# Patient Record
Sex: MALE | Race: WHITE | Employment: OTHER | URBAN - METROPOLITAN AREA
[De-identification: names, ages, dates, MRNs, and addresses within clinical notes are randomized per-mention and may not be internally consistent; named-entity substitution may affect disease eponyms.]

---

## 2017-04-25 PROBLEM — I10 ESSENTIAL HYPERTENSION: Chronic | Status: ACTIVE | Noted: 2017-04-25

## 2017-04-25 PROBLEM — I25.10 CORONARY ARTERY DISEASE INVOLVING NATIVE CORONARY ARTERY WITHOUT ANGINA PECTORIS: Chronic | Status: ACTIVE | Noted: 2017-04-25

## 2017-04-25 PROBLEM — G47.33 OSA (OBSTRUCTIVE SLEEP APNEA): Chronic | Status: ACTIVE | Noted: 2017-04-25

## 2017-04-25 PROBLEM — N17.9 ACUTE KIDNEY INJURY (HCC): Status: ACTIVE | Noted: 2017-04-25

## 2017-04-25 PROBLEM — Z86.711 HISTORY OF PULMONARY EMBOLUS (PE): Chronic | Status: ACTIVE | Noted: 2017-04-25

## 2017-04-25 PROBLEM — E78.5 HYPERLIPIDEMIA LDL GOAL <100: Chronic | Status: ACTIVE | Noted: 2017-04-25

## 2017-04-25 PROBLEM — E86.0 DEHYDRATION: Status: ACTIVE | Noted: 2017-04-25

## 2017-04-25 PROBLEM — E66.01 MORBID OBESITY WITH BMI OF 60.0-69.9, ADULT (HCC): Chronic | Status: ACTIVE | Noted: 2017-04-25

## 2017-05-03 PROBLEM — I50.31 ACUTE DIASTOLIC HEART FAILURE (HCC): Status: ACTIVE | Noted: 2017-05-03

## 2018-09-26 PROBLEM — E86.0 DEHYDRATION: Status: RESOLVED | Noted: 2017-04-25 | Resolved: 2018-09-26

## 2018-11-05 ENCOUNTER — APPOINTMENT (OUTPATIENT)
Dept: GENERAL RADIOLOGY | Age: 63
DRG: 176 | End: 2018-11-05
Payer: COMMERCIAL

## 2018-11-05 ENCOUNTER — HOSPITAL ENCOUNTER (INPATIENT)
Age: 63
LOS: 2 days | Discharge: HOME | DRG: 176 | End: 2018-11-07
Attending: EMERGENCY MEDICINE | Admitting: NEUROMUSCULOSKELETAL MEDICINE & OMM
Payer: COMMERCIAL

## 2018-11-05 ENCOUNTER — APPOINTMENT (OUTPATIENT)
Dept: CT IMAGING | Age: 63
DRG: 176 | End: 2018-11-05
Payer: COMMERCIAL

## 2018-11-05 ENCOUNTER — APPOINTMENT (OUTPATIENT)
Dept: ULTRASOUND IMAGING | Age: 63
DRG: 176 | End: 2018-11-05
Payer: COMMERCIAL

## 2018-11-05 DIAGNOSIS — I26.99 OTHER ACUTE PULMONARY EMBOLISM WITHOUT ACUTE COR PULMONALE (HCC): Primary | ICD-10-CM

## 2018-11-05 DIAGNOSIS — R06.89 DYSPNEA AND RESPIRATORY ABNORMALITIES: ICD-10-CM

## 2018-11-05 DIAGNOSIS — R06.00 DYSPNEA AND RESPIRATORY ABNORMALITIES: ICD-10-CM

## 2018-11-05 LAB
ANION GAP SERPL CALCULATED.3IONS-SCNC: 9 MMOL/L (ref 7–16)
BUN BLDV-MCNC: 36 MG/DL (ref 8–23)
CALCIUM SERPL-MCNC: 8.9 MG/DL (ref 8.6–10.2)
CHLORIDE BLD-SCNC: 99 MMOL/L (ref 98–107)
CO2: 34 MMOL/L (ref 22–29)
CREAT SERPL-MCNC: 1.6 MG/DL (ref 0.7–1.2)
D DIMER: 4058 NG/ML DDU
GFR AFRICAN AMERICAN: 53
GFR NON-AFRICAN AMERICAN: 44 ML/MIN/1.73
GLUCOSE BLD-MCNC: 104 MG/DL (ref 74–99)
HCT VFR BLD CALC: 61.4 % (ref 37–54)
HEMOGLOBIN: 18.7 G/DL (ref 12.5–16.5)
LACTIC ACID: 1.1 MMOL/L (ref 0.5–2.2)
MCH RBC QN AUTO: 24.3 PG (ref 26–35)
MCHC RBC AUTO-ENTMCNC: 30.5 % (ref 32–34.5)
MCV RBC AUTO: 79.9 FL (ref 80–99.9)
METER GLUCOSE: 125 MG/DL (ref 74–99)
METER GLUCOSE: 197 MG/DL (ref 74–99)
PDW BLD-RTO: 18.1 FL (ref 11.5–15)
PLATELET # BLD: 307 E9/L (ref 130–450)
PMV BLD AUTO: 10.2 FL (ref 7–12)
POTASSIUM SERPL-SCNC: 4.9 MMOL/L (ref 3.5–5)
PRO-BNP: 3122 PG/ML (ref 0–125)
RBC # BLD: 7.68 E12/L (ref 3.8–5.8)
SODIUM BLD-SCNC: 142 MMOL/L (ref 132–146)
TROPONIN: 0.07 NG/ML (ref 0–0.03)
WBC # BLD: 9 E9/L (ref 4.5–11.5)

## 2018-11-05 PROCEDURE — 82962 GLUCOSE BLOOD TEST: CPT

## 2018-11-05 PROCEDURE — 71045 X-RAY EXAM CHEST 1 VIEW: CPT

## 2018-11-05 PROCEDURE — 71275 CT ANGIOGRAPHY CHEST: CPT

## 2018-11-05 PROCEDURE — 96374 THER/PROPH/DIAG INJ IV PUSH: CPT

## 2018-11-05 PROCEDURE — 2060000000 HC ICU INTERMEDIATE R&B

## 2018-11-05 PROCEDURE — 83605 ASSAY OF LACTIC ACID: CPT

## 2018-11-05 PROCEDURE — 83880 ASSAY OF NATRIURETIC PEPTIDE: CPT

## 2018-11-05 PROCEDURE — 94760 N-INVAS EAR/PLS OXIMETRY 1: CPT

## 2018-11-05 PROCEDURE — 85027 COMPLETE CBC AUTOMATED: CPT

## 2018-11-05 PROCEDURE — 6370000000 HC RX 637 (ALT 250 FOR IP): Performed by: NEUROMUSCULOSKELETAL MEDICINE & OMM

## 2018-11-05 PROCEDURE — 99285 EMERGENCY DEPT VISIT HI MDM: CPT

## 2018-11-05 PROCEDURE — 6360000004 HC RX CONTRAST MEDICATION: Performed by: RADIOLOGY

## 2018-11-05 PROCEDURE — 93970 EXTREMITY STUDY: CPT

## 2018-11-05 PROCEDURE — 36415 COLL VENOUS BLD VENIPUNCTURE: CPT

## 2018-11-05 PROCEDURE — 6360000002 HC RX W HCPCS: Performed by: EMERGENCY MEDICINE

## 2018-11-05 PROCEDURE — 85378 FIBRIN DEGRADE SEMIQUANT: CPT

## 2018-11-05 PROCEDURE — 80048 BASIC METABOLIC PNL TOTAL CA: CPT

## 2018-11-05 PROCEDURE — 84484 ASSAY OF TROPONIN QUANT: CPT

## 2018-11-05 RX ORDER — ASPIRIN 325 MG
1000 TABLET ORAL EVERY MORNING
Status: DISCONTINUED | OUTPATIENT
Start: 2018-11-06 | End: 2018-11-07 | Stop reason: HOSPADM

## 2018-11-05 RX ORDER — INSULIN GLARGINE 100 [IU]/ML
24 INJECTION, SOLUTION SUBCUTANEOUS NIGHTLY
Refills: 7 | COMMUNITY
Start: 2018-10-27

## 2018-11-05 RX ORDER — CHOLECALCIFEROL (VITAMIN D3) 50 MCG
6000 TABLET ORAL EVERY MORNING
Status: DISCONTINUED | OUTPATIENT
Start: 2018-11-06 | End: 2018-11-07 | Stop reason: HOSPADM

## 2018-11-05 RX ORDER — ASPIRIN 500 MG
1000 TABLET ORAL EVERY MORNING
COMMUNITY
End: 2019-07-16 | Stop reason: ALTCHOICE

## 2018-11-05 RX ORDER — INSULIN GLARGINE 100 [IU]/ML
35 INJECTION, SOLUTION SUBCUTANEOUS NIGHTLY
Status: DISCONTINUED | OUTPATIENT
Start: 2018-11-05 | End: 2018-11-07 | Stop reason: HOSPADM

## 2018-11-05 RX ORDER — FUROSEMIDE 40 MG/1
80 TABLET ORAL EVERY MORNING
Status: DISCONTINUED | OUTPATIENT
Start: 2018-11-06 | End: 2018-11-06

## 2018-11-05 RX ORDER — HYDRALAZINE HYDROCHLORIDE 50 MG/1
50 TABLET, FILM COATED ORAL EVERY MORNING
Status: DISCONTINUED | OUTPATIENT
Start: 2018-11-06 | End: 2018-11-07 | Stop reason: HOSPADM

## 2018-11-05 RX ORDER — FUROSEMIDE 40 MG/1
2 TABLET ORAL EVERY MORNING
Refills: 3 | COMMUNITY
Start: 2018-10-27 | End: 2019-07-16 | Stop reason: ALTCHOICE

## 2018-11-05 RX ADMIN — IOPAMIDOL 90 ML: 755 INJECTION, SOLUTION INTRAVENOUS at 14:12

## 2018-11-05 RX ADMIN — ENOXAPARIN SODIUM 180 MG: 100 INJECTION SUBCUTANEOUS at 13:43

## 2018-11-05 RX ADMIN — INSULIN GLARGINE 35 UNITS: 100 INJECTION, SOLUTION SUBCUTANEOUS at 20:46

## 2018-11-05 ASSESSMENT — PAIN SCALES - GENERAL
PAINLEVEL_OUTOF10: 0
PAINLEVEL_OUTOF10: 0
PAINLEVEL_OUTOF10: 1

## 2018-11-05 NOTE — ED PROVIDER NOTES
RADIOLOGY:  Interpreted by Radiologist.  CTA PULMONARY W CONTRAST   Final Result   Large bilateral central pulmonary embolism clot volume appearing to   represent a disrupted saddle embolus. At this time, there is no evidence of a peripheral infarct, effusion,   or right heart strain. Other incidental nonacute findings are described above. ALERT:  THIS IS AN ABNORMAL REPORT-acute bilateral pulmonary embolism. Note: Findings were communicated by myself to the emergency department   physician at the time of this dictation. US DUP LOWER EXTREMITIES BILATERAL VENOUS   Final Result   Patent deep venous system of both lower extremities. No   evidence for DVT. XR CHEST PORTABLE   Final Result   Mild CHF without edema. There is slight soft tissue prominence right hilum. Surveillance   recommended. ------------------------- NURSING NOTES AND VITALS REVIEWED ---------------------------   The nursing notes within the ED encounter and vital signs as below have been reviewed. /73   Pulse 85   Temp 98 °F (36.7 °C) (Oral)   Resp 18   Ht 5' 9\" (1.753 m)   Wt (!) 400 lb 11.2 oz (181.8 kg)   SpO2 92%   BMI 59.17 kg/m²   Oxygen Saturation Interpretation: Abnormal      ---------------------------------------------------PHYSICAL EXAM--------------------------------------      Constitutional/General: Alert and oriented x3, well appearing, morbidly obese  Head: NC/AT  Eyes: PERRL, EOMI  Mouth: Oropharynx clear, handling secretions, no trismus  Neck: Supple, full ROM, no meningeal signs  Pulmonary: Lungs clear to auscultation bilaterally, no wheezes, rales, or rhonchi. Not in respiratory distress  Cardiovascular:  Regular rate and rhythm, no murmurs, gallops, or rubs. 2+ distal pulses  Abdomen: Soft, non tender, non distended,   Extremities: Moves all extremities x 4.  Warm and well perfused swollen bilaterally mild calf tenderness to the left 1+ pitting

## 2018-11-06 LAB
ALBUMIN SERPL-MCNC: 2.6 G/DL (ref 3.5–5.2)
ALP BLD-CCNC: 72 U/L (ref 40–129)
ALT SERPL-CCNC: 15 U/L (ref 0–40)
ANION GAP SERPL CALCULATED.3IONS-SCNC: 7 MMOL/L (ref 7–16)
AST SERPL-CCNC: 17 U/L (ref 0–39)
BILIRUB SERPL-MCNC: 0.7 MG/DL (ref 0–1.2)
BUN BLDV-MCNC: 41 MG/DL (ref 8–23)
CALCIUM SERPL-MCNC: 8.7 MG/DL (ref 8.6–10.2)
CHLORIDE BLD-SCNC: 101 MMOL/L (ref 98–107)
CO2: 33 MMOL/L (ref 22–29)
CREAT SERPL-MCNC: 1.6 MG/DL (ref 0.7–1.2)
GFR AFRICAN AMERICAN: 53
GFR NON-AFRICAN AMERICAN: 44 ML/MIN/1.73
GLUCOSE BLD-MCNC: 171 MG/DL (ref 74–99)
HBA1C MFR BLD: 8.1 % (ref 4–5.6)
HCT VFR BLD CALC: 53.9 % (ref 37–54)
HEMOGLOBIN: 16.3 G/DL (ref 12.5–16.5)
LV EF: 68 %
LVEF MODALITY: NORMAL
MCH RBC QN AUTO: 24.4 PG (ref 26–35)
MCHC RBC AUTO-ENTMCNC: 30.2 % (ref 32–34.5)
MCV RBC AUTO: 80.7 FL (ref 80–99.9)
METER GLUCOSE: 168 MG/DL (ref 74–99)
METER GLUCOSE: 173 MG/DL (ref 74–99)
METER GLUCOSE: 214 MG/DL (ref 74–99)
PDW BLD-RTO: 17.2 FL (ref 11.5–15)
PLATELET # BLD: 242 E9/L (ref 130–450)
PMV BLD AUTO: 10.7 FL (ref 7–12)
POTASSIUM SERPL-SCNC: 4.8 MMOL/L (ref 3.5–5)
RBC # BLD: 6.68 E12/L (ref 3.8–5.8)
SODIUM BLD-SCNC: 141 MMOL/L (ref 132–146)
TOTAL PROTEIN: 5.3 G/DL (ref 6.4–8.3)
TSH SERPL DL<=0.05 MIU/L-ACNC: 1.22 UIU/ML (ref 0.27–4.2)
WBC # BLD: 6.1 E9/L (ref 4.5–11.5)

## 2018-11-06 PROCEDURE — 6370000000 HC RX 637 (ALT 250 FOR IP): Performed by: NEUROMUSCULOSKELETAL MEDICINE & OMM

## 2018-11-06 PROCEDURE — 6370000000 HC RX 637 (ALT 250 FOR IP): Performed by: INTERNAL MEDICINE

## 2018-11-06 PROCEDURE — 83036 HEMOGLOBIN GLYCOSYLATED A1C: CPT

## 2018-11-06 PROCEDURE — 36415 COLL VENOUS BLD VENIPUNCTURE: CPT

## 2018-11-06 PROCEDURE — 84443 ASSAY THYROID STIM HORMONE: CPT

## 2018-11-06 PROCEDURE — 82962 GLUCOSE BLOOD TEST: CPT

## 2018-11-06 PROCEDURE — 2060000000 HC ICU INTERMEDIATE R&B

## 2018-11-06 PROCEDURE — 80053 COMPREHEN METABOLIC PANEL: CPT

## 2018-11-06 PROCEDURE — 6360000002 HC RX W HCPCS: Performed by: NEUROMUSCULOSKELETAL MEDICINE & OMM

## 2018-11-06 PROCEDURE — 85027 COMPLETE CBC AUTOMATED: CPT

## 2018-11-06 PROCEDURE — 93306 TTE W/DOPPLER COMPLETE: CPT

## 2018-11-06 RX ORDER — FUROSEMIDE 40 MG/1
40 TABLET ORAL EVERY MORNING
Status: DISCONTINUED | OUTPATIENT
Start: 2018-11-07 | End: 2018-11-07 | Stop reason: HOSPADM

## 2018-11-06 RX ADMIN — HYDRALAZINE HYDROCHLORIDE 50 MG: 50 TABLET, FILM COATED ORAL at 10:34

## 2018-11-06 RX ADMIN — ASPIRIN 975 MG: 325 TABLET ORAL at 10:34

## 2018-11-06 RX ADMIN — APIXABAN 10 MG: 5 TABLET, FILM COATED ORAL at 12:09

## 2018-11-06 RX ADMIN — INSULIN GLARGINE 35 UNITS: 100 INJECTION, SOLUTION SUBCUTANEOUS at 20:51

## 2018-11-06 RX ADMIN — APIXABAN 10 MG: 5 TABLET, FILM COATED ORAL at 20:45

## 2018-11-06 RX ADMIN — FUROSEMIDE 80 MG: 40 TABLET ORAL at 10:34

## 2018-11-06 RX ADMIN — CHOLECALCIFEROL TAB 50 MCG (2000 UNIT) 6000 UNITS: 50 TAB at 10:34

## 2018-11-06 RX ADMIN — ENOXAPARIN SODIUM 180 MG: 100 INJECTION SUBCUTANEOUS at 01:19

## 2018-11-06 ASSESSMENT — PAIN SCALES - GENERAL: PAINLEVEL_OUTOF10: 0

## 2018-11-06 NOTE — CONSULTS
disturbance, weakness and joint complaints. · Integumentary: No rash or pruritis. · Neurological: No headache, diplopia, change in muscle strength, numbness or tingling. No change in gait, balance, coordination, mood, affect, memory, mentation, behavior. · Psychiatric: No anxiety or depression. · Endocrine: No temperature intolerance. No excessive thirst, fluid intake, or urination. No tremor. · Hematologic/Lymphatic: No abnormal bruising or bleeding, blood clots or swollen lymph nodes. · Allergic/Immunologic: No nasal congestion or hives. Physical Examination:    Vital Signs: /73   Pulse 92   Temp 98.2 °F (36.8 °C) (Oral)   Resp 18   Ht 5' 9\" (1.753 m)   Wt (!) 400 lb 11.2 oz (181.8 kg)   SpO2 93%   BMI 59.17 kg/m²   General appearance: Well preserved, mesomorphic body habitus, alert, no distress. Skin: Skin color, texture, turgor normal. No rashes or lesions. No induration or tightening palpated. Head: Normocephalic. No masses, lesions, tenderness or abnormalities  Eyes: Conjunctivae/corneas clear. PERRL, EOMs intact. Sclera non icteric. Ears: External ears normal. Canals clear. TM's clear bilaterally. Hearing normal to finger rub. Nose/Sinuses: Nares normal. Septum midline. Mucosa normal. No drainage or sinus tenderness. Oropharynx: Lips, mucosa, and tongue normal. Oropharynx clear with no exudate seen. Neck: Neck supple and symmetric. No adenopathy. Thyroid symmetric, normal size, without nodules. Trachea is midline. Carotids brisk in upstroke without bruits, no abnormal JVP noted at 45°. Chest: Even excursion  Lungs: Lungs grossly clear to auscultation bilaterally. No retractions or use of accessory muscles. No tactile vocal fremitus. No rhonchi, crackles or rales. Heart:  Sounds somewhat distant S1 > S2. Regular rate and rhythm. No gallop or murmur. No rub, palpable thrill or heave noted. PMI 5th intercostal space midclavicular line.   Abdomen: Abdomen soft, grossly protuberant, non-tender. BS normal. No masses, organomegaly. No hernia noted. Extremities: Extremities normal. No deformities, 3+ bilateral ankle and pretibial edema, has significant rubor skin discoloration. No cyanosis or clubbing noted to the nails. Peripheral pulses present 2+ upper extremities and barely palpable  lower extremities. Musculoskeletal: Spine ROM normal. Muscular strength intact. Neuro: Cranial nerves intact. Motor: Strength 5/5 in all extremities. Reflexes 2+ in all extremities. No focal weakness. Sensory: grossly normal to touch. Coordination intact. Pertinent Labs:  CBC:   Recent Labs      18   1004   WBC  9.0   HGB  18.7*   PLT  307     BMP:  Recent Labs      18   1332   NA  142   K  4.9   CL  99   CO2  34*   BUN  36*   CREATININE  1.6*   GLUCOSE  104*   LABGLOM  44     ABGs: No results found for: PH, PO2, PCO2  INR:   No results for input(s): INR in the last 72 hours. PRO-BNP:   Lab Results   Component Value Date    PROBNP 3,122 (H) 2018    PROBNP 1,200 (H) 2017      Cardiac Injury Profile:   Recent Labs      18   1004   TROPONINI  0.07*      Lipid Profile: No results found for: TRIG, HDL, LDLCALC, CHOL   Hemoglobin A1C: No components found for: HGBA1C   ECG:  See report    Radiology:  Xr Chest Portable    Result Date: 2018  Patient MRN:  [id number] : 1955 Age: 58 years Gender: Male Order Date:  2018 10:00 AM EXAM: XR CHEST PORTABLE one view, one image INDICATION:  chest pain chest pain COMPARISON: 2017 FINDINGS: There is cardiomegaly with a mild vascular congestion. There is improving perihilar and bibasilar atelectasis. There is no focal consolidation. Tiny pleural effusions are suspected. Mild CHF without edema. There is slight soft tissue prominence right hilum. Surveillance recommended.      Cta Pulmonary W Contrast    Result Date: 2018  Patient MRN:  [id number] : 1955 Age: 58 years Gender: Male Order Date:  2018 10:30 AM

## 2018-11-06 NOTE — CONSULTS
lower extremities failed to evidence deep venous  thrombosis; however, CT-A of the chest evidenced large bilateral  pulmonary emboli. No comment was made regarding right ventricular  strain. In addition, the patient was noted to be polycythemic. He is currently  admitted to a stepdown area. He has been placed on Lovenox  therapeutically. In the past, despite insistence, the patient was  reluctant to be on Eliquis because of a lack of reversing agent and  reluctant to be on Coumadin from previous complications. PAST MEDICAL HISTORY:  REYNOLD, heart failure, coronary artery disease, type  2 diabetes, hypertension, hyperlipidemia, morbid obesity, sleep apnea  for which the patient is noncompliant with oxygen or CPAP. PAST SURGICAL HISTORY:  Colonoscopy, tonsillectomy, removal of right  buttock hematoma, coronary stent. CURRENT MEDICATIONS:  As an outpatient, they included aspirin,  furosemide, hydralazine, Nitrostat, and Lantus. HABITS:  The patient is a nonsmoker and nondrinker. OCCUPATIONAL HISTORY:  Businessman. REVIEW OF SYSTEMS:  As per HPI; otherwise, complete review of systems is  undertaken and is otherwise negative. PHYSICAL EXAMINATION:  VITAL SIGNS:  Blood pressure is 138/88, respirations 26, heart rate 106,  temperature 98. HEENT:  Head is normocephalic and atraumatic. Eyes:  PERRLA. EOMI. NECK:  JVD and HJR not assessed secondary to the patient's upright  posture. HEART:  S1 and S2.  Regular rate and rhythm. LUNGS:  CTA. CTP. ABDOMEN:  Morbidly obese with bowel sounds present. EXTREMITIES:  Scattered changes of venous stasis. Two-plus pitting  edema. NEUROLOGICAL:  Cranial nerves II through XII appear grossly intact. No  sensory or motor deficits grossly noted. IMPRESSION:  1. Bilateral pulmonary emboli, submassive, but large, mimicking a  saddle embolism. Despite this, the patient's hypoxia is not severe and  he is normotensive and modestly tachycardic.   2.  Venous stasis in the lower extremities, chronic. 3.  Reactive polycythemia secondary to noncompliance with CPAP and  nocturnal oxygen with likely some degree of venous sludging. 4.  Morbid obesity. 5.  Coronary artery disease. PLAN:  1. Lovenox for now. We will discuss the safety and efficacy of the  newer generations of oral anticoagulants. 2.  Encouraged compliance with nocturnal oxygen and/or CPAP given the  patient's reactive polycythemia. 3.  Await Cardiology's opinion. The patient was seen by Cardiology in  the emergency room. The note is yet to be posted. Particular attention  to the right heart on echo. Further recommendations will follow.         Juan Francisco Victoria MD    D: 11/05/2018 20:33:04       T: 11/05/2018 22:59:26     [email or web address]  Job#: [id number]     Doc#: [id number]    CC:

## 2018-11-07 VITALS
TEMPERATURE: 98.2 F | RESPIRATION RATE: 17 BRPM | HEART RATE: 81 BPM | WEIGHT: 315 LBS | OXYGEN SATURATION: 96 % | BODY MASS INDEX: 46.65 KG/M2 | SYSTOLIC BLOOD PRESSURE: 146 MMHG | HEIGHT: 69 IN | DIASTOLIC BLOOD PRESSURE: 78 MMHG

## 2018-11-07 LAB
ALBUMIN SERPL-MCNC: 2.9 G/DL (ref 3.5–5.2)
ALP BLD-CCNC: 73 U/L (ref 40–129)
ALT SERPL-CCNC: 14 U/L (ref 0–40)
ANION GAP SERPL CALCULATED.3IONS-SCNC: 6 MMOL/L (ref 7–16)
AST SERPL-CCNC: 14 U/L (ref 0–39)
BILIRUB SERPL-MCNC: 0.6 MG/DL (ref 0–1.2)
BUN BLDV-MCNC: 39 MG/DL (ref 8–23)
CALCIUM SERPL-MCNC: 8.9 MG/DL (ref 8.6–10.2)
CHLORIDE BLD-SCNC: 101 MMOL/L (ref 98–107)
CO2: 36 MMOL/L (ref 22–29)
CREAT SERPL-MCNC: 1.4 MG/DL (ref 0.7–1.2)
GFR AFRICAN AMERICAN: >60
GFR NON-AFRICAN AMERICAN: 51 ML/MIN/1.73
GLUCOSE BLD-MCNC: 142 MG/DL (ref 74–99)
METER GLUCOSE: 117 MG/DL (ref 74–99)
POTASSIUM SERPL-SCNC: 4.8 MMOL/L (ref 3.5–5)
SODIUM BLD-SCNC: 143 MMOL/L (ref 132–146)
TOTAL PROTEIN: 5.5 G/DL (ref 6.4–8.3)

## 2018-11-07 PROCEDURE — 80053 COMPREHEN METABOLIC PANEL: CPT

## 2018-11-07 PROCEDURE — 6370000000 HC RX 637 (ALT 250 FOR IP): Performed by: NEUROMUSCULOSKELETAL MEDICINE & OMM

## 2018-11-07 PROCEDURE — 36415 COLL VENOUS BLD VENIPUNCTURE: CPT

## 2018-11-07 PROCEDURE — 6370000000 HC RX 637 (ALT 250 FOR IP): Performed by: INTERNAL MEDICINE

## 2018-11-07 PROCEDURE — 82962 GLUCOSE BLOOD TEST: CPT

## 2018-11-07 RX ADMIN — FUROSEMIDE 40 MG: 40 TABLET ORAL at 08:21

## 2018-11-07 RX ADMIN — CHOLECALCIFEROL TAB 50 MCG (2000 UNIT) 6000 UNITS: 50 TAB at 08:21

## 2018-11-07 RX ADMIN — HYDRALAZINE HYDROCHLORIDE 50 MG: 50 TABLET, FILM COATED ORAL at 08:21

## 2018-11-07 RX ADMIN — ASPIRIN 975 MG: 325 TABLET ORAL at 08:21

## 2018-11-07 RX ADMIN — APIXABAN 10 MG: 5 TABLET, FILM COATED ORAL at 08:21

## 2018-11-07 ASSESSMENT — PAIN SCALES - GENERAL
PAINLEVEL_OUTOF10: 0

## 2018-11-07 NOTE — PROGRESS NOTES
conjunctival injection. ENT: No discharge. Pharynx clear. Neck: Trachea midline. Normal thyroid. Resp: No accessory muscle use. No rales. No wheezing. No rhonchi. CV: Regular rate. Regular rhythm. No murmur or rub. Abd: Non-tender. Non-distended. No masses. No organomegaly. Normal bowel sounds. Skin: Warm and dry. No nodule on exposed extremities. No rash on exposed extremities. Ext: No cyanosis, clubbing, edema  Lymph: No cervical LAD. No supraclavicular LAD. M/S: No cyanosis. No joint deformity. No clubbing. Neuro: Awake. Follows commands. Positive pupils/gag/corneals. Normal pain response. Results:  CBC:   Recent Labs      11/05/18   1004  11/06/18   1125   WBC  9.0  6.1   HGB  18.7*  16.3   HCT  61.4*  53.9   MCV  79.9*  80.7   PLT  307  242     BMP:   Recent Labs      11/05/18   1332  11/06/18   1125   NA  142  141   K  4.9  4.8   CL  99  101   CO2  34*  33*   BUN  36*  41*   CREATININE  1.6*  1.6*     LIVER PROFILE:   Recent Labs      11/06/18   1125   AST  17   ALT  15   BILITOT  0.7   ALKPHOS  72     PT/INR: No results for input(s): PROTIME, INR in the last 72 hours. APTT: No results for input(s): APTT in the last 72 hours. Pathology:  1. N/A      Microbiology:  1. None    Recent ABG:   No results for input(s): PH, PO2, PCO2, HCO3, BE, O2SAT, METHB, O2HB, COHB, O2CON, HHB, THB in the last 72 hours. Recent Films:  CTA PULMONARY W CONTRAST   Final Result   Large bilateral central pulmonary embolism clot volume appearing to   represent a disrupted saddle embolus. At this time, there is no evidence of a peripheral infarct, effusion,   or right heart strain. Other incidental nonacute findings are described above. ALERT:  THIS IS AN ABNORMAL REPORT-acute bilateral pulmonary embolism. Note: Findings were communicated by myself to the emergency department   physician at the time of this dictation.          US DUP LOWER EXTREMITIES BILATERAL VENOUS   Final Result Patent deep venous system of both lower extremities. No   evidence for DVT. XR CHEST PORTABLE   Final Result   Mild CHF without edema. There is slight soft tissue prominence right hilum. Surveillance   recommended. Assessment:  1. Deep venous thrombosis with submassive pulmonary embolism  2. Morbid obesity  3. Sleep apnea  4. Likely chronic hypercapnic respiratory failure secondary to obesity hypoventilation syndrome. Per Camillo Claude formula, the patient's  PCO2 is roughly 60    Plan:  1. Continue supplemental oxygen  2. Stop Lovenox  3. Continue Eliquis. Duration of therapy is for life. 4. May eventually need evaluation for pulmonary artery hypertension at [facility]. CTEPH is the primary concern. Care reviewed with the staff and the patient's family as available. Please note that voice recognition technology was used in the preparation of this note and make therefore it may contain inadvertent transcription errors. Bobbi Beavers M.D., F.C.C.P.     [facility], [address] [place] [address], [address] [place] [address]

## 2018-11-08 NOTE — DISCHARGE SUMMARY
Family Practice Discharge Summary    Jennifer Cortes  :  1955  MRN:  [id number]    Admit date:  2018  Discharge date:  2018    Admitting Physician:  Lexa Rodriguez DO    Discharge Diagnoses:    Patient Active Problem List   Diagnosis    Morbid obesity with BMI of 60.0-69.9, adult ([address]    SUKHWINDER (obstructive sleep apnea)    Diabetes mellitus type 2, uncontrolled [facility] [facility]    Coronary artery disease involving native coronary artery without angina pectoris    Hyperlipidemia LDL goal <100    Essential hypertension    Acute kidney injury [facility] [address]    History of pulmonary embolus (PE)    Acute diastolic heart failure ([facility] [address]    Pulmonary embolism, bilateral ([facility] [address]       Admission Condition:  fair    Discharged Condition:  fair    Hospital Course:   See chart for clinical details. Discharge Medications:    See medication reconciliation under discharge insructions. Consults:  cardiology and pulmonary/intensive care    Significant Diagnostic Studies:  radiology: CT scan: CTA of chest, showing massive shanna pulmonary emboli.     Treatments:   anticoagulation: Eliquis    Discharge Exam:  BP (!) 146/78   Pulse 81   Temp 98.2 °F (36.8 °C) (Oral)   Resp 17   Ht 5' 9\" (1.753 m)   Wt (!) 400 lb 3.2 oz (181.5 kg)   SpO2 96%   BMI 59.10 kg/m²     General Appearance:    Alert, cooperative, no distress, appears stated age   Head:    Normocephalic, without obvious abnormality, atraumatic   Eyes:    PERRL, conjunctiva/corneas clear, EOM's intact, fundi     benign, both eyes        Ears:    Normal TM's and external ear canals, both ears   Nose:   Nares normal, septum midline, mucosa normal, no drainage    or sinus tenderness   Throat:   Lips, mucosa, and tongue normal; teeth and gums normal   Neck:   Supple, symmetrical, trachea midline, no adenopathy;        thyroid:  No enlargement/tenderness/nodules; no carotid    bruit or JVD   Back:     Symmetric, no curvature, ROM normal, no CVA tenderness   Lungs:

## 2018-11-18 LAB
EKG ATRIAL RATE: 101 BPM
EKG P AXIS: 45 DEGREES
EKG P-R INTERVAL: 158 MS
EKG Q-T INTERVAL: 344 MS
EKG QRS DURATION: 84 MS
EKG QTC CALCULATION (BAZETT): 446 MS
EKG R AXIS: 94 DEGREES
EKG T AXIS: 40 DEGREES
EKG VENTRICULAR RATE: 101 BPM

## 2018-12-03 ENCOUNTER — HOSPITAL ENCOUNTER (OUTPATIENT)
Dept: CT IMAGING | Age: 63
Discharge: HOME | End: 2018-12-05
Payer: COMMERCIAL

## 2018-12-03 DIAGNOSIS — I26.99 IATROGENIC PULMONARY EMBOLISM AND INFARCTION, SUBSEQUENT ENCOUNTER (HCC): ICD-10-CM

## 2018-12-03 DIAGNOSIS — T81.718D IATROGENIC PULMONARY EMBOLISM AND INFARCTION, SUBSEQUENT ENCOUNTER (HCC): ICD-10-CM

## 2018-12-03 PROCEDURE — 6360000004 HC RX CONTRAST MEDICATION: Performed by: RADIOLOGY

## 2018-12-03 PROCEDURE — 71275 CT ANGIOGRAPHY CHEST: CPT

## 2018-12-03 RX ADMIN — IOPAMIDOL 80 ML: 755 INJECTION, SOLUTION INTRAVENOUS at 17:38

## 2019-03-13 ENCOUNTER — APPOINTMENT (OUTPATIENT)
Dept: CT IMAGING | Age: 64
End: 2019-03-13
Payer: COMMERCIAL

## 2019-03-13 ENCOUNTER — HOSPITAL ENCOUNTER (EMERGENCY)
Age: 64
Discharge: HOME | End: 2019-03-13
Attending: EMERGENCY MEDICINE
Payer: COMMERCIAL

## 2019-03-13 VITALS
RESPIRATION RATE: 16 BRPM | OXYGEN SATURATION: 97 % | DIASTOLIC BLOOD PRESSURE: 97 MMHG | TEMPERATURE: 98 F | SYSTOLIC BLOOD PRESSURE: 147 MMHG | HEART RATE: 87 BPM | BODY MASS INDEX: 46.65 KG/M2 | WEIGHT: 315 LBS | HEIGHT: 69 IN

## 2019-03-13 DIAGNOSIS — R10.32 ABDOMINAL PAIN, LEFT LOWER QUADRANT: Primary | ICD-10-CM

## 2019-03-13 LAB
ALBUMIN SERPL-MCNC: 3.6 G/DL (ref 3.5–5.2)
ALP BLD-CCNC: 68 U/L (ref 40–129)
ALT SERPL-CCNC: 10 U/L (ref 0–40)
ANION GAP SERPL CALCULATED.3IONS-SCNC: 7 MMOL/L (ref 7–16)
AST SERPL-CCNC: 20 U/L (ref 0–39)
BACTERIA: NORMAL /HPF
BASOPHILS ABSOLUTE: 0.04 E9/L (ref 0–0.2)
BASOPHILS RELATIVE PERCENT: 0.5 % (ref 0–2)
BILIRUB SERPL-MCNC: 0.7 MG/DL (ref 0–1.2)
BILIRUBIN URINE: NEGATIVE
BLOOD, URINE: NEGATIVE
BUN BLDV-MCNC: 40 MG/DL (ref 8–23)
CALCIUM SERPL-MCNC: 9.3 MG/DL (ref 8.6–10.2)
CHLORIDE BLD-SCNC: 101 MMOL/L (ref 98–107)
CLARITY: CLEAR
CO2: 33 MMOL/L (ref 22–29)
COLOR: YELLOW
CREAT SERPL-MCNC: 1.2 MG/DL (ref 0.7–1.2)
EOSINOPHILS ABSOLUTE: 0.25 E9/L (ref 0.05–0.5)
EOSINOPHILS RELATIVE PERCENT: 3.3 % (ref 0–6)
GFR AFRICAN AMERICAN: >60
GFR NON-AFRICAN AMERICAN: >60 ML/MIN/1.73
GLUCOSE BLD-MCNC: 132 MG/DL (ref 74–99)
GLUCOSE URINE: NEGATIVE MG/DL
HCT VFR BLD CALC: 48 % (ref 37–54)
HEMOGLOBIN: 14.6 G/DL (ref 12.5–16.5)
IMMATURE GRANULOCYTES #: 0.02 E9/L
IMMATURE GRANULOCYTES %: 0.3 % (ref 0–5)
KETONES, URINE: NEGATIVE MG/DL
LACTIC ACID: 0.8 MMOL/L (ref 0.5–2.2)
LEUKOCYTE ESTERASE, URINE: NEGATIVE
LYMPHOCYTES ABSOLUTE: 1.04 E9/L (ref 1.5–4)
LYMPHOCYTES RELATIVE PERCENT: 13.9 % (ref 20–42)
MCH RBC QN AUTO: 25.2 PG (ref 26–35)
MCHC RBC AUTO-ENTMCNC: 30.4 % (ref 32–34.5)
MCV RBC AUTO: 82.9 FL (ref 80–99.9)
MONOCYTES ABSOLUTE: 0.44 E9/L (ref 0.1–0.95)
MONOCYTES RELATIVE PERCENT: 5.9 % (ref 2–12)
NEUTROPHILS ABSOLUTE: 5.7 E9/L (ref 1.8–7.3)
NEUTROPHILS RELATIVE PERCENT: 76.1 % (ref 43–80)
NITRITE, URINE: NEGATIVE
PDW BLD-RTO: 15.1 FL (ref 11.5–15)
PH UA: 6 (ref 5–9)
PLATELET # BLD: 352 E9/L (ref 130–450)
PMV BLD AUTO: 10.1 FL (ref 7–12)
POTASSIUM SERPL-SCNC: 5.7 MMOL/L (ref 3.5–5)
PROTEIN UA: 100 MG/DL
RBC # BLD: 5.79 E12/L (ref 3.8–5.8)
RBC UA: NORMAL /HPF (ref 0–2)
SODIUM BLD-SCNC: 141 MMOL/L (ref 132–146)
SPECIFIC GRAVITY UA: 1.02 (ref 1–1.03)
TOTAL PROTEIN: 6.8 G/DL (ref 6.4–8.3)
UROBILINOGEN, URINE: 0.2 E.U./DL
WBC # BLD: 7.5 E9/L (ref 4.5–11.5)
WBC UA: NORMAL /HPF (ref 0–5)

## 2019-03-13 PROCEDURE — 36415 COLL VENOUS BLD VENIPUNCTURE: CPT

## 2019-03-13 PROCEDURE — 83605 ASSAY OF LACTIC ACID: CPT

## 2019-03-13 PROCEDURE — 99284 EMERGENCY DEPT VISIT MOD MDM: CPT

## 2019-03-13 PROCEDURE — 85025 COMPLETE CBC W/AUTO DIFF WBC: CPT

## 2019-03-13 PROCEDURE — 74176 CT ABD & PELVIS W/O CONTRAST: CPT

## 2019-03-13 PROCEDURE — 81001 URINALYSIS AUTO W/SCOPE: CPT

## 2019-03-13 PROCEDURE — 87088 URINE BACTERIA CULTURE: CPT

## 2019-03-13 PROCEDURE — 80053 COMPREHEN METABOLIC PANEL: CPT

## 2019-03-13 ASSESSMENT — PAIN DESCRIPTION - DESCRIPTORS: DESCRIPTORS: SHARP

## 2019-03-13 ASSESSMENT — PAIN DESCRIPTION - LOCATION: LOCATION: FLANK

## 2019-03-13 ASSESSMENT — PAIN DESCRIPTION - FREQUENCY: FREQUENCY: CONTINUOUS

## 2019-03-13 ASSESSMENT — PAIN DESCRIPTION - ORIENTATION: ORIENTATION: LEFT

## 2019-03-13 ASSESSMENT — PAIN SCALES - GENERAL: PAINLEVEL_OUTOF10: 6

## 2019-03-13 ASSESSMENT — PAIN DESCRIPTION - PAIN TYPE: TYPE: ACUTE PAIN

## 2019-03-15 LAB — URINE CULTURE, ROUTINE: NORMAL

## 2019-07-16 ENCOUNTER — HOSPITAL ENCOUNTER (OUTPATIENT)
Dept: CARDIAC REHAB | Age: 64
Setting detail: THERAPIES SERIES
Discharge: HOME | End: 2019-07-16
Payer: COMMERCIAL

## 2019-07-16 ENCOUNTER — OFFICE VISIT (OUTPATIENT)
Dept: VASCULAR SURGERY | Age: 64
End: 2019-07-16
Payer: COMMERCIAL

## 2019-07-16 VITALS
SYSTOLIC BLOOD PRESSURE: 138 MMHG | HEIGHT: 69 IN | HEART RATE: 76 BPM | DIASTOLIC BLOOD PRESSURE: 75 MMHG | RESPIRATION RATE: 24 BRPM | OXYGEN SATURATION: 90 % | BODY MASS INDEX: 46.65 KG/M2 | WEIGHT: 315 LBS

## 2019-07-16 VITALS — DIASTOLIC BLOOD PRESSURE: 72 MMHG | SYSTOLIC BLOOD PRESSURE: 128 MMHG | HEART RATE: 88 BPM

## 2019-07-16 DIAGNOSIS — G57.01 NEUROPATHY OF RIGHT SCIATIC NERVE: Primary | ICD-10-CM

## 2019-07-16 PROCEDURE — 99213 OFFICE O/P EST LOW 20 MIN: CPT | Performed by: SURGERY

## 2019-07-16 RX ORDER — PANTOPRAZOLE SODIUM 20 MG/1
20 TABLET, DELAYED RELEASE ORAL DAILY
COMMUNITY
End: 2022-09-10 | Stop reason: SDUPTHER

## 2019-07-16 RX ORDER — TORSEMIDE 20 MG/1
20 TABLET ORAL DAILY
COMMUNITY

## 2019-07-16 RX ORDER — WARFARIN SODIUM 5 MG/1
5 TABLET ORAL
COMMUNITY
End: 2022-07-20 | Stop reason: SDUPTHER

## 2019-07-16 ASSESSMENT — PATIENT HEALTH QUESTIONNAIRE - PHQ9: SUM OF ALL RESPONSES TO PHQ QUESTIONS 1-9: 11

## 2019-07-16 NOTE — PROGRESS NOTES
a sliding scale basis   Yes Historical Provider, MD   LANTUS SOLOSTAR 100 UNIT/ML injection pen Inject 35 Units into the skin nightly 10/27/18  Yes Historical Provider, MD   OXYGEN Inhale 3 L into the lungs as needed (Pt. uses at night to sleep)    Yes Historical Provider, MD     Allergies:  Dilaudid [hydromorphone hcl] and Ultram [tramadol]    Social History     Socioeconomic History    Marital status:      Spouse name: Not on file    Number of children: Not on file    Years of education: Not on file    Highest education level: Not on file   Occupational History    Not on file   Social Needs    Financial resource strain: Not on file    Food insecurity:     Worry: Not on file     Inability: Not on file    Transportation needs:     Medical: Not on file     Non-medical: Not on file   Tobacco Use    Smoking status: Current Some Day Smoker     Types: Cigars    Smokeless tobacco: Never Used    Tobacco comment: Denies cigarette smoking   Substance and Sexual Activity    Alcohol use: Yes     Comment: rarely    Drug use: No    Sexual activity: Not Currently     Partners: Female   Lifestyle    Physical activity:     Days per week: Not on file     Minutes per session: Not on file    Stress: Not on file   Relationships    Social connections:     Talks on phone: Not on file     Gets together: Not on file     Attends Buddhist service: Not on file     Active member of club or organization: Not on file     Attends meetings of clubs or organizations: Not on file     Relationship status: Not on file    Intimate partner violence:     Fear of current or ex partner: Not on file     Emotionally abused: Not on file     Physically abused: Not on file     Forced sexual activity: Not on file   Other Topics Concern    Not on file   Social History Narrative    Not on file        Family History   Problem Relation Age of Onset    High Blood Pressure Mother     Colon Cancer Mother     Heart Attack Father normocephalic and atraumatic  Eyes: extraocular eye movements intact, conjunctivae normal  ENT: external ear and ear canal normal bilaterally, nose without deformity  Pulmonary/Chest: normal air movement, no respiratory distress  Cardiovascular: normal rate, regular rhythm  Abdomen: non-distended, no masses, obese  Musculoskeletal: no joint deformity or tenderness  Extremities: no leg edema bilaterally, obese, induration around vein harvest incisions but no evidence of infection. 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 I feel that he has sciatica due to lumbar nerve root compression likely exacerbated during his prolonged surgery. He would benefit from a spine work-up starting with lumbar films and possibly a CAT scan or MRI. He has had injections in his spine before and he may require these again or possibly even surgery if his symptoms do not improve. No follow-ups on file.

## 2019-07-22 ENCOUNTER — HOSPITAL ENCOUNTER (OUTPATIENT)
Dept: CARDIAC REHAB | Age: 64
Setting detail: THERAPIES SERIES
Discharge: HOME | End: 2019-07-22
Payer: COMMERCIAL

## 2019-07-22 PROCEDURE — 93798 PHYS/QHP OP CAR RHAB W/ECG: CPT

## 2019-08-10 ENCOUNTER — HOSPITAL ENCOUNTER (EMERGENCY)
Age: 64
Discharge: HOME | End: 2019-08-10
Attending: EMERGENCY MEDICINE
Payer: COMMERCIAL

## 2019-08-10 ENCOUNTER — APPOINTMENT (OUTPATIENT)
Dept: GENERAL RADIOLOGY | Age: 64
End: 2019-08-10
Payer: COMMERCIAL

## 2019-08-10 VITALS
RESPIRATION RATE: 14 BRPM | HEIGHT: 70 IN | SYSTOLIC BLOOD PRESSURE: 183 MMHG | HEART RATE: 78 BPM | DIASTOLIC BLOOD PRESSURE: 89 MMHG | OXYGEN SATURATION: 93 % | TEMPERATURE: 98.5 F | BODY MASS INDEX: 45.1 KG/M2 | WEIGHT: 315 LBS

## 2019-08-10 DIAGNOSIS — M25.561 ACUTE PAIN OF RIGHT KNEE: Primary | ICD-10-CM

## 2019-08-10 PROCEDURE — 99283 EMERGENCY DEPT VISIT LOW MDM: CPT

## 2019-08-10 PROCEDURE — 6370000000 HC RX 637 (ALT 250 FOR IP): Performed by: EMERGENCY MEDICINE

## 2019-08-10 PROCEDURE — 73562 X-RAY EXAM OF KNEE 3: CPT

## 2019-08-10 PROCEDURE — 73590 X-RAY EXAM OF LOWER LEG: CPT

## 2019-08-10 RX ORDER — METHOCARBAMOL 500 MG/1
1000 TABLET, FILM COATED ORAL 3 TIMES DAILY
Qty: 42 TABLET | Refills: 0 | Status: SHIPPED | OUTPATIENT
Start: 2019-08-10 | End: 2019-08-17

## 2019-08-10 RX ORDER — HYDROCODONE BITARTRATE AND ACETAMINOPHEN 5; 325 MG/1; MG/1
2 TABLET ORAL ONCE
Status: COMPLETED | OUTPATIENT
Start: 2019-08-10 | End: 2019-08-10

## 2019-08-10 RX ORDER — HYDROCODONE BITARTRATE AND ACETAMINOPHEN 5; 325 MG/1; MG/1
1 TABLET ORAL EVERY 4 HOURS PRN
Qty: 18 TABLET | Refills: 0 | Status: SHIPPED | OUTPATIENT
Start: 2019-08-10 | End: 2019-08-13

## 2019-08-10 RX ADMIN — HYDROCODONE BITARTRATE AND ACETAMINOPHEN 2 TABLET: 5; 325 TABLET ORAL at 21:40

## 2019-08-10 ASSESSMENT — PAIN - FUNCTIONAL ASSESSMENT: PAIN_FUNCTIONAL_ASSESSMENT: PREVENTS OR INTERFERES WITH ALL ACTIVE AND SOME PASSIVE ACTIVITIES

## 2019-08-10 ASSESSMENT — PAIN DESCRIPTION - FREQUENCY: FREQUENCY: CONTINUOUS

## 2019-08-10 ASSESSMENT — PAIN SCALES - GENERAL
PAINLEVEL_OUTOF10: 5
PAINLEVEL_OUTOF10: 5

## 2019-08-10 ASSESSMENT — PAIN DESCRIPTION - LOCATION: LOCATION: KNEE;LEG

## 2019-08-10 ASSESSMENT — PAIN DESCRIPTION - PAIN TYPE: TYPE: ACUTE PAIN

## 2019-08-10 ASSESSMENT — PAIN DESCRIPTION - DESCRIPTORS: DESCRIPTORS: SHARP

## 2019-08-10 ASSESSMENT — PAIN DESCRIPTION - ORIENTATION: ORIENTATION: RIGHT

## 2019-08-11 NOTE — ED PROVIDER NOTES
Affect      ------------------------------ ED COURSE/MEDICAL DECISION MAKING----------------------  Medications   HYDROcodone-acetaminophen (NORCO) 5-325 MG per tablet 2 tablet (2 tablets Oral Given 8/10/19 4010)                Medical Decision Makin-year-old male presenting with right knee pain after mechanical fall. He is otherwise uninjured. He has range of motion but there are an area of ecchymosis and abrasion where he is tender. However x-ray imaging shows no acute bony abnormalities. There was concern for abnormality at the lateral malleolus of the ankle, however patient has no tenderness, no signs of trauma. Patient was given a dose of Norco with improvement in pain. Ace wrap was applied and patient was given crutches to weight-bear as tolerated, follow-up with orthopedics. He will be given a short course of Norco for breakthrough pain, instruction to return for any changes in condition or new symptoms. Counseling: The emergency provider has spoken with the patient and spouse/SO and discussed todays results, in addition to providing specific details for the plan of care and counseling regarding the diagnosis and prognosis. Questions are answered at this time and they are agreeable with the plan.      --------------------------------- IMPRESSION AND DISPOSITION ---------------------------------    IMPRESSION  1. Acute pain of right knee        DISPOSITION  Disposition: Discharge to home  Patient condition is stable      NOTE: This report was transcribed using voice recognition software.  Every effort was made to ensure accuracy; however, inadvertent computerized transcription errors may be present        Mohit Eaton DO  [phone]

## 2019-10-20 ENCOUNTER — APPOINTMENT (OUTPATIENT)
Dept: GENERAL RADIOLOGY | Age: 64
End: 2019-10-20
Payer: MEDICARE

## 2019-10-20 ENCOUNTER — HOSPITAL ENCOUNTER (OUTPATIENT)
Age: 64
Setting detail: OBSERVATION
Discharge: NURSING, REHAB OR LONG-TERM CARE | End: 2019-10-22
Attending: EMERGENCY MEDICINE | Admitting: NEUROMUSCULOSKELETAL MEDICINE & OMM
Payer: MEDICARE

## 2019-10-20 DIAGNOSIS — R53.1 GENERAL WEAKNESS: Primary | ICD-10-CM

## 2019-10-20 PROBLEM — R53.81 PHYSICAL DECONDITIONING: Status: ACTIVE | Noted: 2019-10-20

## 2019-10-20 LAB
ANION GAP SERPL CALCULATED.3IONS-SCNC: 6 MMOL/L (ref 7–16)
BACTERIA: ABNORMAL /HPF
BILIRUBIN URINE: NEGATIVE
BLOOD, URINE: ABNORMAL
BUN BLDV-MCNC: 44 MG/DL (ref 8–23)
CALCIUM SERPL-MCNC: 9.2 MG/DL (ref 8.6–10.2)
CHLORIDE BLD-SCNC: 101 MMOL/L (ref 98–107)
CLARITY: ABNORMAL
CO2: 36 MMOL/L (ref 22–29)
COLOR: ABNORMAL
CREAT SERPL-MCNC: 1.2 MG/DL (ref 0.7–1.2)
EPITHELIAL CELLS, UA: ABNORMAL /HPF
GFR AFRICAN AMERICAN: >60
GFR NON-AFRICAN AMERICAN: >60 ML/MIN/1.73
GLUCOSE BLD-MCNC: 100 MG/DL (ref 74–99)
GLUCOSE URINE: NEGATIVE MG/DL
HCT VFR BLD CALC: 46.2 % (ref 37–54)
HEMOGLOBIN: 12.8 G/DL (ref 12.5–16.5)
INR BLD: 2.7
KETONES, URINE: NEGATIVE MG/DL
LEUKOCYTE ESTERASE, URINE: ABNORMAL
MCH RBC QN AUTO: 21.7 PG (ref 26–35)
MCHC RBC AUTO-ENTMCNC: 27.7 % (ref 32–34.5)
MCV RBC AUTO: 78.3 FL (ref 80–99.9)
METER GLUCOSE: 101 MG/DL (ref 74–99)
METER GLUCOSE: 207 MG/DL (ref 74–99)
NITRITE, URINE: NEGATIVE
PDW BLD-RTO: 20 FL (ref 11.5–15)
PH UA: 6 (ref 5–9)
PLATELET # BLD: 375 E9/L (ref 130–450)
PMV BLD AUTO: 10.2 FL (ref 7–12)
POTASSIUM SERPL-SCNC: 5.2 MMOL/L (ref 3.5–5)
PROTEIN UA: 100 MG/DL
PROTHROMBIN TIME: 33.1 SEC (ref 9.3–12.4)
RBC # BLD: 5.9 E12/L (ref 3.8–5.8)
RBC UA: ABNORMAL /HPF (ref 0–2)
SODIUM BLD-SCNC: 143 MMOL/L (ref 132–146)
SPECIFIC GRAVITY UA: 1.02 (ref 1–1.03)
UROBILINOGEN, URINE: 1 E.U./DL
WBC # BLD: 8.1 E9/L (ref 4.5–11.5)
WBC UA: >20 /HPF (ref 0–5)

## 2019-10-20 PROCEDURE — 80048 BASIC METABOLIC PNL TOTAL CA: CPT

## 2019-10-20 PROCEDURE — 73562 X-RAY EXAM OF KNEE 3: CPT

## 2019-10-20 PROCEDURE — 82962 GLUCOSE BLOOD TEST: CPT

## 2019-10-20 PROCEDURE — 6370000000 HC RX 637 (ALT 250 FOR IP): Performed by: NEUROMUSCULOSKELETAL MEDICINE & OMM

## 2019-10-20 PROCEDURE — 85027 COMPLETE CBC AUTOMATED: CPT

## 2019-10-20 PROCEDURE — G0378 HOSPITAL OBSERVATION PER HR: HCPCS

## 2019-10-20 PROCEDURE — 36415 COLL VENOUS BLD VENIPUNCTURE: CPT

## 2019-10-20 PROCEDURE — 85610 PROTHROMBIN TIME: CPT

## 2019-10-20 PROCEDURE — 99285 EMERGENCY DEPT VISIT HI MDM: CPT

## 2019-10-20 PROCEDURE — 81001 URINALYSIS AUTO W/SCOPE: CPT

## 2019-10-20 PROCEDURE — 1200000000 HC SEMI PRIVATE

## 2019-10-20 RX ORDER — ASPIRIN 81 MG/1
81 TABLET, CHEWABLE ORAL DAILY
Status: DISCONTINUED | OUTPATIENT
Start: 2019-10-20 | End: 2019-10-22 | Stop reason: HOSPADM

## 2019-10-20 RX ORDER — NICOTINE POLACRILEX 4 MG
15 LOZENGE BUCCAL PRN
Status: DISCONTINUED | OUTPATIENT
Start: 2019-10-20 | End: 2019-10-22 | Stop reason: HOSPADM

## 2019-10-20 RX ORDER — WARFARIN SODIUM 5 MG/1
5 TABLET ORAL DAILY
Status: DISCONTINUED | OUTPATIENT
Start: 2019-10-20 | End: 2019-10-22 | Stop reason: HOSPADM

## 2019-10-20 RX ORDER — TORSEMIDE 20 MG/1
20 TABLET ORAL DAILY
Status: DISCONTINUED | OUTPATIENT
Start: 2019-10-20 | End: 2019-10-22 | Stop reason: HOSPADM

## 2019-10-20 RX ORDER — INSULIN GLARGINE 100 [IU]/ML
35 INJECTION, SOLUTION SUBCUTANEOUS NIGHTLY
Status: DISCONTINUED | OUTPATIENT
Start: 2019-10-20 | End: 2019-10-22 | Stop reason: HOSPADM

## 2019-10-20 RX ORDER — DEXTROSE MONOHYDRATE 50 MG/ML
100 INJECTION, SOLUTION INTRAVENOUS PRN
Status: DISCONTINUED | OUTPATIENT
Start: 2019-10-20 | End: 2019-10-22 | Stop reason: HOSPADM

## 2019-10-20 RX ORDER — DEXTROSE MONOHYDRATE 25 G/50ML
12.5 INJECTION, SOLUTION INTRAVENOUS PRN
Status: DISCONTINUED | OUTPATIENT
Start: 2019-10-20 | End: 2019-10-22 | Stop reason: HOSPADM

## 2019-10-20 RX ORDER — PANTOPRAZOLE SODIUM 20 MG/1
20 TABLET, DELAYED RELEASE ORAL DAILY
Status: DISCONTINUED | OUTPATIENT
Start: 2019-10-20 | End: 2019-10-22 | Stop reason: HOSPADM

## 2019-10-20 RX ADMIN — TORSEMIDE 20 MG: 20 TABLET ORAL at 18:44

## 2019-10-20 RX ADMIN — INSULIN LISPRO 1 UNITS: 100 INJECTION, SOLUTION INTRAVENOUS; SUBCUTANEOUS at 21:20

## 2019-10-20 RX ADMIN — WARFARIN SODIUM 5 MG: 5 TABLET ORAL at 22:34

## 2019-10-20 RX ADMIN — INSULIN GLARGINE 35 UNITS: 100 INJECTION, SOLUTION SUBCUTANEOUS at 21:29

## 2019-10-20 RX ADMIN — ASPIRIN 81 MG 81 MG: 81 TABLET ORAL at 18:44

## 2019-10-20 RX ADMIN — METOPROLOL TARTRATE 25 MG: 25 TABLET ORAL at 21:17

## 2019-10-20 RX ADMIN — VITAMIN D, TAB 1000IU (100/BT) 1000 UNITS: 25 TAB at 18:44

## 2019-10-20 RX ADMIN — PANTOPRAZOLE SODIUM 20 MG: 20 TABLET, DELAYED RELEASE ORAL at 22:34

## 2019-10-20 ASSESSMENT — PAIN SCALES - GENERAL
PAINLEVEL_OUTOF10: 0
PAINLEVEL_OUTOF10: 0

## 2019-10-21 LAB
ANION GAP SERPL CALCULATED.3IONS-SCNC: 7 MMOL/L (ref 7–16)
BUN BLDV-MCNC: 41 MG/DL (ref 8–23)
CALCIUM SERPL-MCNC: 9 MG/DL (ref 8.6–10.2)
CHLORIDE BLD-SCNC: 101 MMOL/L (ref 98–107)
CO2: 34 MMOL/L (ref 22–29)
CREAT SERPL-MCNC: 1.1 MG/DL (ref 0.7–1.2)
GFR AFRICAN AMERICAN: >60
GFR NON-AFRICAN AMERICAN: >60 ML/MIN/1.73
GLUCOSE BLD-MCNC: 154 MG/DL (ref 74–99)
HBA1C MFR BLD: 6.2 % (ref 4–5.6)
INR BLD: 2.3
METER GLUCOSE: 124 MG/DL (ref 74–99)
METER GLUCOSE: 128 MG/DL (ref 74–99)
METER GLUCOSE: 155 MG/DL (ref 74–99)
METER GLUCOSE: 181 MG/DL (ref 74–99)
POTASSIUM SERPL-SCNC: 5.1 MMOL/L (ref 3.5–5)
PROTHROMBIN TIME: 27.4 SEC (ref 9.3–12.4)
SODIUM BLD-SCNC: 142 MMOL/L (ref 132–146)

## 2019-10-21 PROCEDURE — 2500000003 HC RX 250 WO HCPCS: Performed by: NEUROMUSCULOSKELETAL MEDICINE & OMM

## 2019-10-21 PROCEDURE — 83036 HEMOGLOBIN GLYCOSYLATED A1C: CPT

## 2019-10-21 PROCEDURE — 36415 COLL VENOUS BLD VENIPUNCTURE: CPT

## 2019-10-21 PROCEDURE — G0378 HOSPITAL OBSERVATION PER HR: HCPCS

## 2019-10-21 PROCEDURE — 82962 GLUCOSE BLOOD TEST: CPT

## 2019-10-21 PROCEDURE — 6370000000 HC RX 637 (ALT 250 FOR IP): Performed by: NEUROMUSCULOSKELETAL MEDICINE & OMM

## 2019-10-21 PROCEDURE — 80048 BASIC METABOLIC PNL TOTAL CA: CPT

## 2019-10-21 PROCEDURE — 97162 PT EVAL MOD COMPLEX 30 MIN: CPT

## 2019-10-21 PROCEDURE — 1200000000 HC SEMI PRIVATE

## 2019-10-21 PROCEDURE — 85610 PROTHROMBIN TIME: CPT

## 2019-10-21 PROCEDURE — 97530 THERAPEUTIC ACTIVITIES: CPT

## 2019-10-21 RX ADMIN — METOPROLOL TARTRATE 25 MG: 25 TABLET ORAL at 21:10

## 2019-10-21 RX ADMIN — VITAMIN D, TAB 1000IU (100/BT) 1000 UNITS: 25 TAB at 08:45

## 2019-10-21 RX ADMIN — INSULIN GLARGINE 35 UNITS: 100 INJECTION, SOLUTION SUBCUTANEOUS at 21:22

## 2019-10-21 RX ADMIN — MICONAZOLE NITRATE: 20 POWDER TOPICAL at 21:10

## 2019-10-21 RX ADMIN — TORSEMIDE 20 MG: 20 TABLET ORAL at 08:45

## 2019-10-21 RX ADMIN — WARFARIN SODIUM 5 MG: 5 TABLET ORAL at 18:44

## 2019-10-21 RX ADMIN — INSULIN LISPRO 1 UNITS: 100 INJECTION, SOLUTION INTRAVENOUS; SUBCUTANEOUS at 12:12

## 2019-10-21 RX ADMIN — PANTOPRAZOLE SODIUM 20 MG: 20 TABLET, DELAYED RELEASE ORAL at 08:45

## 2019-10-21 RX ADMIN — ASPIRIN 81 MG 81 MG: 81 TABLET ORAL at 08:45

## 2019-10-21 RX ADMIN — INSULIN LISPRO 1 UNITS: 100 INJECTION, SOLUTION INTRAVENOUS; SUBCUTANEOUS at 21:11

## 2019-10-21 RX ADMIN — METOPROLOL TARTRATE 25 MG: 25 TABLET ORAL at 08:45

## 2019-10-21 RX ADMIN — MICONAZOLE NITRATE: 20 POWDER TOPICAL at 08:45

## 2019-10-21 ASSESSMENT — PAIN SCALES - GENERAL
PAINLEVEL_OUTOF10: 0
PAINLEVEL_OUTOF10: 0

## 2019-10-22 VITALS
RESPIRATION RATE: 18 BRPM | HEART RATE: 80 BPM | OXYGEN SATURATION: 95 % | SYSTOLIC BLOOD PRESSURE: 164 MMHG | BODY MASS INDEX: 46.65 KG/M2 | HEIGHT: 69 IN | TEMPERATURE: 98.2 F | WEIGHT: 315 LBS | DIASTOLIC BLOOD PRESSURE: 77 MMHG

## 2019-10-22 LAB
INR BLD: 2.1
METER GLUCOSE: 121 MG/DL (ref 74–99)
METER GLUCOSE: 230 MG/DL (ref 74–99)
PROTHROMBIN TIME: 24.9 SEC (ref 9.3–12.4)

## 2019-10-22 PROCEDURE — 36415 COLL VENOUS BLD VENIPUNCTURE: CPT

## 2019-10-22 PROCEDURE — G0378 HOSPITAL OBSERVATION PER HR: HCPCS

## 2019-10-22 PROCEDURE — 82962 GLUCOSE BLOOD TEST: CPT

## 2019-10-22 PROCEDURE — 6370000000 HC RX 637 (ALT 250 FOR IP): Performed by: NEUROMUSCULOSKELETAL MEDICINE & OMM

## 2019-10-22 PROCEDURE — 85610 PROTHROMBIN TIME: CPT

## 2019-10-22 RX ADMIN — VITAMIN D, TAB 1000IU (100/BT) 1000 UNITS: 25 TAB at 09:52

## 2019-10-22 RX ADMIN — ASPIRIN 81 MG 81 MG: 81 TABLET ORAL at 09:52

## 2019-10-22 RX ADMIN — TORSEMIDE 20 MG: 20 TABLET ORAL at 09:52

## 2019-10-22 RX ADMIN — MICONAZOLE NITRATE: 20 POWDER TOPICAL at 09:00

## 2019-10-22 RX ADMIN — INSULIN LISPRO 2 UNITS: 100 INJECTION, SOLUTION INTRAVENOUS; SUBCUTANEOUS at 09:52

## 2019-10-22 RX ADMIN — METOPROLOL TARTRATE 25 MG: 25 TABLET ORAL at 09:52

## 2019-10-22 RX ADMIN — PANTOPRAZOLE SODIUM 20 MG: 20 TABLET, DELAYED RELEASE ORAL at 09:52

## 2019-10-22 ASSESSMENT — PAIN SCALES - GENERAL: PAINLEVEL_OUTOF10: 0

## 2019-11-17 ENCOUNTER — CLINICAL DOCUMENTATION (OUTPATIENT)
Dept: CARDIAC REHAB | Age: 64
End: 2019-11-17

## 2020-07-26 ENCOUNTER — APPOINTMENT (OUTPATIENT)
Dept: GENERAL RADIOLOGY | Age: 65
DRG: 563 | End: 2020-07-26
Payer: MEDICARE

## 2020-07-26 ENCOUNTER — APPOINTMENT (OUTPATIENT)
Dept: CT IMAGING | Age: 65
DRG: 563 | End: 2020-07-26
Payer: MEDICARE

## 2020-07-26 ENCOUNTER — HOSPITAL ENCOUNTER (INPATIENT)
Age: 65
LOS: 1 days | Discharge: ANOTHER HOSPITAL | DRG: 563 | End: 2020-07-27
Attending: EMERGENCY MEDICINE | Admitting: NEUROMUSCULOSKELETAL MEDICINE & OMM
Payer: MEDICARE

## 2020-07-26 LAB
ANION GAP SERPL CALCULATED.3IONS-SCNC: 9 MMOL/L (ref 7–16)
BASOPHILS ABSOLUTE: 0.05 E9/L (ref 0–0.2)
BASOPHILS RELATIVE PERCENT: 0.3 % (ref 0–2)
BUN BLDV-MCNC: 38 MG/DL (ref 8–23)
CALCIUM SERPL-MCNC: 8.2 MG/DL (ref 8.6–10.2)
CHLORIDE BLD-SCNC: 104 MMOL/L (ref 98–107)
CO2: 26 MMOL/L (ref 22–29)
CREAT SERPL-MCNC: 1.6 MG/DL (ref 0.7–1.2)
EOSINOPHILS ABSOLUTE: 0.13 E9/L (ref 0.05–0.5)
EOSINOPHILS RELATIVE PERCENT: 0.7 % (ref 0–6)
GFR AFRICAN AMERICAN: 53
GFR NON-AFRICAN AMERICAN: 43 ML/MIN/1.73
GLUCOSE BLD-MCNC: 261 MG/DL (ref 74–99)
HCT VFR BLD CALC: 45.7 % (ref 37–54)
HEMOGLOBIN: 13.4 G/DL (ref 12.5–16.5)
IMMATURE GRANULOCYTES #: 0.07 E9/L
IMMATURE GRANULOCYTES %: 0.4 % (ref 0–5)
INR BLD: 2.5
LYMPHOCYTES ABSOLUTE: 0.9 E9/L (ref 1.5–4)
LYMPHOCYTES RELATIVE PERCENT: 5.1 % (ref 20–42)
MCH RBC QN AUTO: 23.3 PG (ref 26–35)
MCHC RBC AUTO-ENTMCNC: 29.3 % (ref 32–34.5)
MCV RBC AUTO: 79.5 FL (ref 80–99.9)
MONOCYTES ABSOLUTE: 0.86 E9/L (ref 0.1–0.95)
MONOCYTES RELATIVE PERCENT: 4.9 % (ref 2–12)
NEUTROPHILS ABSOLUTE: 15.7 E9/L (ref 1.8–7.3)
NEUTROPHILS RELATIVE PERCENT: 88.6 % (ref 43–80)
PDW BLD-RTO: 17.2 FL (ref 11.5–15)
PLATELET # BLD: 432 E9/L (ref 130–450)
PMV BLD AUTO: 10.3 FL (ref 7–12)
POTASSIUM SERPL-SCNC: 5.6 MMOL/L (ref 3.5–5)
PROTHROMBIN TIME: 30.6 SEC (ref 9.3–12.4)
RBC # BLD: 5.75 E12/L (ref 3.8–5.8)
SODIUM BLD-SCNC: 139 MMOL/L (ref 132–146)
WBC # BLD: 17.7 E9/L (ref 4.5–11.5)

## 2020-07-26 PROCEDURE — 85025 COMPLETE CBC W/AUTO DIFF WBC: CPT

## 2020-07-26 PROCEDURE — 73060 X-RAY EXAM OF HUMERUS: CPT

## 2020-07-26 PROCEDURE — 85610 PROTHROMBIN TIME: CPT

## 2020-07-26 PROCEDURE — 96374 THER/PROPH/DIAG INJ IV PUSH: CPT

## 2020-07-26 PROCEDURE — 6360000002 HC RX W HCPCS: Performed by: PHYSICIAN ASSISTANT

## 2020-07-26 PROCEDURE — 99285 EMERGENCY DEPT VISIT HI MDM: CPT

## 2020-07-26 PROCEDURE — 96375 TX/PRO/DX INJ NEW DRUG ADDON: CPT

## 2020-07-26 PROCEDURE — 93005 ELECTROCARDIOGRAM TRACING: CPT | Performed by: PHYSICIAN ASSISTANT

## 2020-07-26 PROCEDURE — 73552 X-RAY EXAM OF FEMUR 2/>: CPT

## 2020-07-26 PROCEDURE — 73560 X-RAY EXAM OF KNEE 1 OR 2: CPT

## 2020-07-26 PROCEDURE — 73120 X-RAY EXAM OF HAND: CPT

## 2020-07-26 PROCEDURE — 96376 TX/PRO/DX INJ SAME DRUG ADON: CPT

## 2020-07-26 PROCEDURE — 80048 BASIC METABOLIC PNL TOTAL CA: CPT

## 2020-07-26 PROCEDURE — 73030 X-RAY EXAM OF SHOULDER: CPT

## 2020-07-26 RX ORDER — ONDANSETRON 2 MG/ML
4 INJECTION INTRAMUSCULAR; INTRAVENOUS ONCE
Status: COMPLETED | OUTPATIENT
Start: 2020-07-26 | End: 2020-07-26

## 2020-07-26 RX ORDER — MORPHINE SULFATE 8 MG/ML
5 INJECTION, SOLUTION INTRAMUSCULAR; INTRAVENOUS ONCE
Status: COMPLETED | OUTPATIENT
Start: 2020-07-26 | End: 2020-07-26

## 2020-07-26 RX ORDER — MORPHINE SULFATE 4 MG/ML
4 INJECTION, SOLUTION INTRAMUSCULAR; INTRAVENOUS ONCE
Status: COMPLETED | OUTPATIENT
Start: 2020-07-26 | End: 2020-07-26

## 2020-07-26 RX ADMIN — MORPHINE SULFATE 4 MG: 4 INJECTION, SOLUTION INTRAMUSCULAR; INTRAVENOUS at 23:21

## 2020-07-26 RX ADMIN — MORPHINE SULFATE 5 MG: 8 INJECTION, SOLUTION INTRAMUSCULAR; INTRAVENOUS at 21:58

## 2020-07-26 RX ADMIN — ONDANSETRON 4 MG: 2 INJECTION INTRAMUSCULAR; INTRAVENOUS at 21:58

## 2020-07-26 ASSESSMENT — PAIN DESCRIPTION - FREQUENCY: FREQUENCY: CONTINUOUS

## 2020-07-26 ASSESSMENT — PAIN SCALES - GENERAL
PAINLEVEL_OUTOF10: 9
PAINLEVEL_OUTOF10: 10
PAINLEVEL_OUTOF10: 10

## 2020-07-26 ASSESSMENT — PAIN DESCRIPTION - ORIENTATION: ORIENTATION: LEFT

## 2020-07-26 ASSESSMENT — PAIN DESCRIPTION - PROGRESSION: CLINICAL_PROGRESSION: NOT CHANGED

## 2020-07-26 ASSESSMENT — PAIN DESCRIPTION - PAIN TYPE: TYPE: ACUTE PAIN

## 2020-07-26 ASSESSMENT — PAIN DESCRIPTION - DESCRIPTORS: DESCRIPTORS: ACHING;CONSTANT;DISCOMFORT

## 2020-07-26 ASSESSMENT — PAIN DESCRIPTION - LOCATION: LOCATION: ARM;LEG

## 2020-07-26 ASSESSMENT — PAIN DESCRIPTION - ONSET: ONSET: ON-GOING

## 2020-07-27 VITALS
SYSTOLIC BLOOD PRESSURE: 120 MMHG | HEART RATE: 79 BPM | DIASTOLIC BLOOD PRESSURE: 64 MMHG | HEIGHT: 69 IN | OXYGEN SATURATION: 97 % | TEMPERATURE: 97.7 F | BODY MASS INDEX: 46.65 KG/M2 | RESPIRATION RATE: 16 BRPM | WEIGHT: 315 LBS

## 2020-07-27 PROBLEM — S42.209A DISPLACED FRACTURE OF PROXIMAL END OF HUMERUS: Status: ACTIVE | Noted: 2020-07-27

## 2020-07-27 LAB
ALBUMIN SERPL-MCNC: 3 G/DL (ref 3.5–5.2)
ALP BLD-CCNC: 74 U/L (ref 40–129)
ALT SERPL-CCNC: 20 U/L (ref 0–40)
ANION GAP SERPL CALCULATED.3IONS-SCNC: 9 MMOL/L (ref 7–16)
AST SERPL-CCNC: 31 U/L (ref 0–39)
BILIRUB SERPL-MCNC: 0.5 MG/DL (ref 0–1.2)
BUN BLDV-MCNC: 43 MG/DL (ref 8–23)
CALCIUM SERPL-MCNC: 8.2 MG/DL (ref 8.6–10.2)
CHLORIDE BLD-SCNC: 103 MMOL/L (ref 98–107)
CO2: 28 MMOL/L (ref 22–29)
CREAT SERPL-MCNC: 2 MG/DL (ref 0.7–1.2)
EKG ATRIAL RATE: 58 BPM
EKG P AXIS: 31 DEGREES
EKG P-R INTERVAL: 158 MS
EKG Q-T INTERVAL: 446 MS
EKG QRS DURATION: 98 MS
EKG QTC CALCULATION (BAZETT): 437 MS
EKG R AXIS: 48 DEGREES
EKG T AXIS: 69 DEGREES
EKG VENTRICULAR RATE: 58 BPM
GFR AFRICAN AMERICAN: 41
GFR NON-AFRICAN AMERICAN: 34 ML/MIN/1.73
GLUCOSE BLD-MCNC: 294 MG/DL (ref 74–99)
HBA1C MFR BLD: 7 % (ref 4–5.6)
HCT VFR BLD CALC: 44.2 % (ref 37–54)
HEMOGLOBIN: 12.8 G/DL (ref 12.5–16.5)
INR BLD: 2.5
MCH RBC QN AUTO: 23.4 PG (ref 26–35)
MCHC RBC AUTO-ENTMCNC: 29 % (ref 32–34.5)
MCV RBC AUTO: 81 FL (ref 80–99.9)
METER GLUCOSE: 258 MG/DL (ref 74–99)
METER GLUCOSE: 262 MG/DL (ref 74–99)
PDW BLD-RTO: 17 FL (ref 11.5–15)
PLATELET # BLD: 509 E9/L (ref 130–450)
PMV BLD AUTO: 10.8 FL (ref 7–12)
POTASSIUM SERPL-SCNC: 6.1 MMOL/L (ref 3.5–5)
PROTHROMBIN TIME: 29.4 SEC (ref 9.3–12.4)
RBC # BLD: 5.46 E12/L (ref 3.8–5.8)
SARS-COV-2, NAAT: NOT DETECTED
SODIUM BLD-SCNC: 140 MMOL/L (ref 132–146)
TOTAL PROTEIN: 5.7 G/DL (ref 6.4–8.3)
TSH SERPL DL<=0.05 MIU/L-ACNC: 1.83 UIU/ML (ref 0.27–4.2)
WBC # BLD: 13.3 E9/L (ref 4.5–11.5)

## 2020-07-27 PROCEDURE — 84443 ASSAY THYROID STIM HORMONE: CPT

## 2020-07-27 PROCEDURE — 2500000003 HC RX 250 WO HCPCS: Performed by: NEUROMUSCULOSKELETAL MEDICINE & OMM

## 2020-07-27 PROCEDURE — 85027 COMPLETE CBC AUTOMATED: CPT

## 2020-07-27 PROCEDURE — U0002 COVID-19 LAB TEST NON-CDC: HCPCS

## 2020-07-27 PROCEDURE — 1200000000 HC SEMI PRIVATE

## 2020-07-27 PROCEDURE — 36415 COLL VENOUS BLD VENIPUNCTURE: CPT

## 2020-07-27 PROCEDURE — 80053 COMPREHEN METABOLIC PANEL: CPT

## 2020-07-27 PROCEDURE — 6360000002 HC RX W HCPCS: Performed by: PHYSICIAN ASSISTANT

## 2020-07-27 PROCEDURE — 85610 PROTHROMBIN TIME: CPT

## 2020-07-27 PROCEDURE — 82962 GLUCOSE BLOOD TEST: CPT

## 2020-07-27 PROCEDURE — 83036 HEMOGLOBIN GLYCOSYLATED A1C: CPT

## 2020-07-27 PROCEDURE — 6360000002 HC RX W HCPCS: Performed by: NEUROMUSCULOSKELETAL MEDICINE & OMM

## 2020-07-27 PROCEDURE — 6370000000 HC RX 637 (ALT 250 FOR IP): Performed by: NEUROMUSCULOSKELETAL MEDICINE & OMM

## 2020-07-27 PROCEDURE — 2580000003 HC RX 258: Performed by: NEUROMUSCULOSKELETAL MEDICINE & OMM

## 2020-07-27 RX ORDER — TORSEMIDE 20 MG/1
20 TABLET ORAL DAILY
Status: DISCONTINUED | OUTPATIENT
Start: 2020-07-27 | End: 2020-07-27 | Stop reason: HOSPADM

## 2020-07-27 RX ORDER — SODIUM CHLORIDE 9 MG/ML
INJECTION, SOLUTION INTRAVENOUS CONTINUOUS
Status: DISCONTINUED | OUTPATIENT
Start: 2020-07-27 | End: 2020-07-27 | Stop reason: HOSPADM

## 2020-07-27 RX ORDER — SODIUM CHLORIDE 0.9 % (FLUSH) 0.9 %
10 SYRINGE (ML) INJECTION 2 TIMES DAILY
Status: DISCONTINUED | OUTPATIENT
Start: 2020-07-27 | End: 2020-07-27 | Stop reason: HOSPADM

## 2020-07-27 RX ORDER — FENTANYL CITRATE 50 UG/ML
25 INJECTION, SOLUTION INTRAMUSCULAR; INTRAVENOUS EVERY 4 HOURS PRN
Status: DISCONTINUED | OUTPATIENT
Start: 2020-07-27 | End: 2020-07-27

## 2020-07-27 RX ORDER — FENTANYL CITRATE 50 UG/ML
50 INJECTION, SOLUTION INTRAMUSCULAR; INTRAVENOUS ONCE
Status: COMPLETED | OUTPATIENT
Start: 2020-07-27 | End: 2020-07-27

## 2020-07-27 RX ORDER — ASPIRIN 81 MG/1
81 TABLET ORAL DAILY
Status: DISCONTINUED | OUTPATIENT
Start: 2020-07-27 | End: 2020-07-27 | Stop reason: HOSPADM

## 2020-07-27 RX ORDER — INSULIN GLARGINE 100 [IU]/ML
24 INJECTION, SOLUTION SUBCUTANEOUS NIGHTLY
Status: DISCONTINUED | OUTPATIENT
Start: 2020-07-27 | End: 2020-07-27 | Stop reason: HOSPADM

## 2020-07-27 RX ORDER — SODIUM CHLORIDE 0.9 % (FLUSH) 0.9 %
10 SYRINGE (ML) INJECTION PRN
Status: DISCONTINUED | OUTPATIENT
Start: 2020-07-27 | End: 2020-07-27 | Stop reason: HOSPADM

## 2020-07-27 RX ORDER — PANTOPRAZOLE SODIUM 20 MG/1
20 TABLET, DELAYED RELEASE ORAL DAILY
Status: DISCONTINUED | OUTPATIENT
Start: 2020-07-27 | End: 2020-07-27 | Stop reason: HOSPADM

## 2020-07-27 RX ORDER — ONDANSETRON 2 MG/ML
4 INJECTION INTRAMUSCULAR; INTRAVENOUS EVERY 6 HOURS PRN
Status: DISCONTINUED | OUTPATIENT
Start: 2020-07-27 | End: 2020-07-27 | Stop reason: HOSPADM

## 2020-07-27 RX ORDER — FENTANYL CITRATE 50 UG/ML
50 INJECTION, SOLUTION INTRAMUSCULAR; INTRAVENOUS
Status: DISCONTINUED | OUTPATIENT
Start: 2020-07-27 | End: 2020-07-27 | Stop reason: HOSPADM

## 2020-07-27 RX ORDER — VITAMIN B COMPLEX
1000 TABLET ORAL DAILY
Status: DISCONTINUED | OUTPATIENT
Start: 2020-07-27 | End: 2020-07-27 | Stop reason: HOSPADM

## 2020-07-27 RX ADMIN — ASPIRIN 81 MG: 81 TABLET, COATED ORAL at 08:04

## 2020-07-27 RX ADMIN — VITAMIN D, TAB 1000IU (100/BT) 1000 UNITS: 25 TAB at 08:04

## 2020-07-27 RX ADMIN — TORSEMIDE 20 MG: 20 TABLET ORAL at 08:04

## 2020-07-27 RX ADMIN — INSULIN LISPRO 2 UNITS: 100 INJECTION, SOLUTION INTRAVENOUS; SUBCUTANEOUS at 08:05

## 2020-07-27 RX ADMIN — INSULIN LISPRO 3 UNITS: 100 INJECTION, SOLUTION INTRAVENOUS; SUBCUTANEOUS at 11:43

## 2020-07-27 RX ADMIN — METOPROLOL TARTRATE 25 MG: 25 TABLET, FILM COATED ORAL at 08:04

## 2020-07-27 RX ADMIN — FENTANYL CITRATE 25 MCG: 50 INJECTION, SOLUTION INTRAMUSCULAR; INTRAVENOUS at 03:59

## 2020-07-27 RX ADMIN — FENTANYL CITRATE 50 MCG: 50 INJECTION, SOLUTION INTRAMUSCULAR; INTRAVENOUS at 12:18

## 2020-07-27 RX ADMIN — FENTANYL CITRATE 50 MCG: 50 INJECTION, SOLUTION INTRAMUSCULAR; INTRAVENOUS at 09:38

## 2020-07-27 RX ADMIN — SODIUM CHLORIDE 80 ML/HR: 9 INJECTION, SOLUTION INTRAVENOUS at 04:01

## 2020-07-27 RX ADMIN — MICONAZOLE NITRATE: 20 POWDER TOPICAL at 08:04

## 2020-07-27 RX ADMIN — FENTANYL CITRATE 25 MCG: 50 INJECTION, SOLUTION INTRAMUSCULAR; INTRAVENOUS at 08:04

## 2020-07-27 RX ADMIN — FENTANYL CITRATE 50 MCG: 50 INJECTION INTRAMUSCULAR; INTRAVENOUS at 01:12

## 2020-07-27 ASSESSMENT — PAIN DESCRIPTION - DESCRIPTORS
DESCRIPTORS: ACHING;DISCOMFORT

## 2020-07-27 ASSESSMENT — PAIN DESCRIPTION - FREQUENCY
FREQUENCY: CONTINUOUS

## 2020-07-27 ASSESSMENT — PAIN DESCRIPTION - LOCATION
LOCATION: KNEE;SHOULDER
LOCATION: SHOULDER;KNEE

## 2020-07-27 ASSESSMENT — PAIN DESCRIPTION - ORIENTATION
ORIENTATION: LEFT

## 2020-07-27 ASSESSMENT — PAIN SCALES - GENERAL
PAINLEVEL_OUTOF10: 0
PAINLEVEL_OUTOF10: 10
PAINLEVEL_OUTOF10: 8
PAINLEVEL_OUTOF10: 9
PAINLEVEL_OUTOF10: 10

## 2020-07-27 ASSESSMENT — PAIN - FUNCTIONAL ASSESSMENT
PAIN_FUNCTIONAL_ASSESSMENT: PREVENTS OR INTERFERES SOME ACTIVE ACTIVITIES AND ADLS
PAIN_FUNCTIONAL_ASSESSMENT: PREVENTS OR INTERFERES WITH MANY ACTIVE NOT PASSIVE ACTIVITIES
PAIN_FUNCTIONAL_ASSESSMENT: PREVENTS OR INTERFERES WITH MANY ACTIVE NOT PASSIVE ACTIVITIES
PAIN_FUNCTIONAL_ASSESSMENT: PREVENTS OR INTERFERES SOME ACTIVE ACTIVITIES AND ADLS

## 2020-07-27 ASSESSMENT — PAIN DESCRIPTION - PAIN TYPE
TYPE: ACUTE PAIN

## 2020-07-27 ASSESSMENT — PAIN DESCRIPTION - PROGRESSION
CLINICAL_PROGRESSION: NOT CHANGED
CLINICAL_PROGRESSION: NOT CHANGED

## 2020-07-27 NOTE — CARE COORDINATION
Social Work:    Reviewed chart notes. Patient fell and suffered a non-surgical knee & humerus injury. Social service &  met with Mr. Lorenzo Hyatt (Janina Salcedo), explained our roles within the case management department and discussed discharge planning. Janina Salcedo resides with his wife & daughter and used a cane prior to admission. Janina Salcedo has a history of obesity he relates to his present ailment. He prefers to transfer to the [facility] due to his medical history & prior heart surgeries performed there. Social service &  to follow supportively.     Electronically signed by SUGAR Aggarwal on 7/27/2020 at 11:05 AM

## 2020-07-27 NOTE — H&P
[address] [address]                              HISTORY AND PHYSICAL    PATIENT NAME: Marianna Dooley                  :        1955  MED REC NO:   [id number]                            ROOM:       [id number]  ACCOUNT NO:   [de-identified]                           ADMIT DATE: 2020  PROVIDER:     Marcos Kim DO    CHIEF COMPLAINT:  Fall. HISTORY OF CHIEF COMPLAINT:  This 27-year-old  male presents to  the emergency room after tripping and falling at home landing on his  left shoulder and knee just prior to arrival.  The patient has a history  of recurrent frequent falls in his home due to his crisis of ambulation  and gait instability. 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 The patient states that he has much  pain, severe with movement of his left shoulder and his left lower  extremity. The patient states while he was walking at home with his  cane, he tripped over the carpet and fell. The patient states that he  slammed forward hitting his head, not losing consciousness, and landing  on his left side. The patient was unable to get up on his own. EMS had  to be called for further evaluation. The patient is on chronic Coumadin  due to his history of pulmonary embolism. His INR in the ER was at 2.5. He refused CAT scan of his head, chest, and cervical spine due to his  pain. The patient did not pass out when he did hit his head. He  complains of left shoulder and left knee pain as well as right hand  pain. Dr. Sergei Wade was consulted in the ER, Orthopedics.   X-ray  of his left shoulder showed a proximal left humeral fracture and he medical problems. REVIEW OF SYSTEMS:  As mentioned in chief complaint, otherwise  unremarkable. PHYSICAL EXAMINATION:  VITAL SIGNS:  On admission; temperature 97.9, pulse 61, respirations 16,  blood pressure 133/53, pulse ox 96% on 3 liters nasal cannula. His  height is 5 feet 9 inches and weight is 424 pounds. His BMI is 62.73. GENERAL:  Alert and oriented x3, in moderate amount of distress due to  his right shoulder pain. SKIN:  Adequate turgor. No tenting. No rash or wounds. HEENT:  Unremarkable. HEART:  Regular rate and rhythm. No appreciable murmurs, rubs, or  gallops. LUNGS:  Decreased breath sounds in the bilateral bases. ABDOMEN:  Obese, soft, nontender, and nondistended. Good bowel sounds  throughout. No masses, no organomegaly, and no bruits. EXTREMITIES:  He had marked decreased range of motion with abduction and  adduction and flexion and extension of his left upper extremity in the  shoulder area. He has point tenderness in his AC area and proximal  humeral area. Right hand shows swelling and tenderness along the  metacarpal area with decreased range of motion with mild erythema. Skin  is intact. Left knee:  No obvious deformities. Mild swelling. Decreased range of motion with flexion and extension. NEUROLOGICAL:  Intact. No focal deficits. Cranial nerves II through  XII grossly intact. MUSCULOSKELETAL:  Appropriate for above-stated age. DIAGNOSTIC STUDIES:  Imaging of the left shoulder shows a displaced  proximal humeral fracture. Right hand demonstrates a right fifth  metacarpal fracture. Right knee does not demonstrate any obvious  fracture or dislocation. CT scan of the head, chest, and cervical spine  were declined per patient. LABORATORY STUDIES:  Metabolic panel shows a sodium of 139, potassium of  5.6, chloride 104, CO2 26, BUN and creatinine 38 and 1.6. His sugar was  261. CBC:  White count 17.7, platelets 664, H and H 13.4 and 45.7. IMPRESSION:  1. Recurrent and frequent falls. 2.  Left proximal humeral fracture secondary to recurrent and frequent  falls. 3.  Crisis of ambulation. 4.  Severe deconditioning. 5.  Weakness. 6.  Hyperkalemia. 7.  Chronic kidney disease, stage III to IV. 8.  Type 2 diabetes, uncontrolled. 9.  History of pulmonary embolism, on chronic oral anticoagulation of  Coumadin 5 mg daily. 10.  Morbid obesity. 11.  Obstructive sleep apnea. 12.  Coronary artery disease, status post ACB x5 in 2019 at [facility]. 13.  Diffuse arthritis. 14.  Hypertension. 15.  Hyperlipidemia. 16.  Leukocytosis, could be stress related. PLAN:  The patient will be admitted under my service to a general  medical floor with consults to Orthopedics. We will start IV fluids and  IV pain meds of Dilaudid 25 mcg every four hours as needed for pain. The patient will need a sling for his left upper extremity when he is  out of bed. We will have PT and OT to evaluate and treat. Also get   consulted for discharge planning and placement issues. We will await further recommendations per consultants and treat  accordingly.     DISPOSITION:  To rehab and/or skilled nursing facility pending clinical  course here at [address] [place] [address]    D: 07/27/2020 8:50:35       T: 07/27/2020 8:56:28     [email or web address]  Job#: [id number]     Doc#: [id number]    CC:

## 2020-07-27 NOTE — PROGRESS NOTES
Right small finger MC fx - will brace tomorrow. Left proximal humerus fracture - let elbow hand free. Sling when out of bed. Left knee contusion - WBAT. No plan for surgical intervention at this time.

## 2020-07-27 NOTE — CONSULTS
[address] [address]                                  CONSULTATION    PATIENT NAME: Maria D Tenorio                  :        1955  MED REC NO:   [id number]                            ROOM:       [id number]  ACCOUNT NO:   [de-identified]                           ADMIT DATE: 2020  PROVIDER:     Sol Ko    CONSULT DATE:  2020    ORTHOPEDIC CONSULTATION    CHIEF COMPLAINT:  Left shoulder pain, left knee pain, right hand pain. Pain score is 8/10. REQUESTING PROVIDER:  Abbie Carney DO    HISTORY OF PRESENT ILLNESS:  This is a 62-year-old male who had a fall  from standing this evening. He was walking from the kitchen and tripped  over a carpet. He fell. He landed on his left side and right hand. He  had inability to move his shoulder. He presented to the ER. His pain  is sharp. His pain is the same. Pain is worse with activities. His  pain is better at rest.  He has pain about his left shoulder, right  hand, and left knee. He presented to the ER. X-rays were ordered. PAST MEDICAL HISTORY:  Morbid obesity, obstructive sleep apnea, PE,  kidney disease, hypertension, high cholesterol, diabetes, coronary  artery disease, arthritis. PAST SURGICAL HISTORY:  Heart surgery, colonoscopy, tonsillectomy. ALLERGIES:  DILAUDID. MEDICATIONS:  See list.    SOCIAL HISTORY:  Smokes cigars. Drinks socially. Denies drugs. FAMILY HISTORY:  Heart attack, blood pressure, colon cancer. REVIEW OF SYSTEMS:  Denies any fever or chills. The patient is positive  for sweats. Denies any nausea or vomiting. The patient does have some  chest pain secondary to fall. Denies shortness of breath. Denies  change in bowel or bladder habits. Denies vision changes. IMAGING DATA:  X-rays:  Left shoulder demonstrate displaced proximal  humerus fracture.   Right hand demonstrate right fifth metacarpal  fracture. Left knee did not demonstrate any obvious fracture. ASSESSMENT:  This is a 69-year-old male with left proximal humerus  fracture, right fifth metacarpal fracture, and left knee contusion. PLAN:  The patient will be admitted to Medicine. He will be  nonweightbearing, left arm and right small finger. He will use a sling  when he was out of bed for his left shoulder. He should allow his elbow  to hang free. He should perform armpit hygiene keeping it clean and  dry. He was offered a splint, but deferred. He would like to be placed  in a brace. A brace will be placed tomorrow. For his left knee  contusion, he may be weightbearing as tolerated. No surgical  intervention planned at this time. Of note, greater than 50 minutes was spent on the floor with the patient  and family performing history and physical, reviewing labs and imaging,  and discussing plan. Half of the time was spent counseling and  coordinating care.         Jose Noland    D: 07/26/2020 23:26:40       T: 07/26/2020 23:28:58     [email or web address]  Job#: [id number]     Doc#: [id number]    CC:

## 2020-07-27 NOTE — CARE COORDINATION
Discharge plan is to transfer to [facility] today.  Per Katey Dixon @ [facility] [phone], accepting physician is Dr. Trung Downs

## 2020-07-27 NOTE — ED NOTES
Bed: 16  Expected date:   Expected time:   Means of arrival:   Comments:  ems     Raciel Dias RN  07/26/20 2056

## 2020-07-28 NOTE — DISCHARGE SUMMARY
Family Practice Discharge Summary    Kelsi Swenson  :  1955  MRN:  [id number]    Admit date:  2020  Discharge date:  2020    Admitting Physician:  Starla Lee DO    Discharge Diagnoses:    Patient Active Problem List   Diagnosis    Morbid obesity with BMI of 60.0-69.9, adult ([facility]    SUKHWINDER (obstructive sleep apnea)    Diabetes mellitus type 2, uncontrolled ([facility]    Coronary artery disease involving native coronary artery without angina pectoris    Hyperlipidemia LDL goal <100    Essential hypertension    Acute kidney injury ([facility]    History of pulmonary embolus (PE)    Acute diastolic heart failure ([facility]    Pulmonary embolism, bilateral (HCC)    Neuropathy of right sciatic nerve    Generalized weakness    Physical deconditioning    Displaced fracture of proximal end of humerus       Admission Condition:  fair    Discharged Condition:  fair    Hospital Course: This 79-year-old  male presents to  the emergency room after tripping and falling at home landing on his  left shoulder and knee just prior to arrival.  The patient has a history  of recurrent frequent falls in his home due to his crisis of ambulation  and gait instability. He is morbidly obese and does have a history of  obstructive sleep apnea along with uncontrolled type 2 diabetes,  coronary artery disease status post ACB x5 in 2019 at [facility],  he also has a history of hypertension, hyperlipidemia, and chronic  kidney disease stage III to IV. The patient states that he has much  pain, severe with movement of his left shoulder and his left lower  extremity. The patient states while he was walking at home with his  cane, he tripped over the carpet and fell. The patient states that he  slammed forward hitting his head, not losing consciousness, and landing  on his left side. The patient was unable to get up on his own. EMS had  to be called for further evaluation.   The patient is on chronic Coumadin  due to his history of pulmonary embolism. His INR in the ER was at 2.5. He refused CAT scan of his head, chest, and cervical spine due to his  pain. The patient did not pass out when he did hit his head. He  complains of left shoulder and left knee pain as well as right hand  pain. Dr. Cherylene Comings was consulted in the ER, Orthopedics. X-ray  of his left shoulder showed a proximal left humeral fracture and he also  has a fracture of his right fifth metacarpal.  The patient was medicated  with IV morphine and will be admitted under my service to a  medical-surgical floor with consults to orthopedics Dr. Tunde Martinez, who  has already seen by the patient. We will have PT and OT to evaluate and  treat regarding his frequent falls with weakness and deconditioning as  well as  with discharge planning. I will start him on  some IV fentanyl for pain and await further recommendations per  consultants.       Discharge Medications:    See medication reconciliation under discharge insructions. Consults:  orthopedic surgery    Significant Diagnostic Studies:  radiology: X-Ray: L shoulder, showing proximal humerus fracture.      Treatments:   IV hydration and analgesia: Fentanyl prn IV    Discharge Exam:  /64   Pulse 79   Temp 97.7 °F (36.5 °C) (Oral)   Resp 16   Ht 5' 9\" (1.753 m)   Wt (!) 424 lb 12.8 oz (192.7 kg)   SpO2 97%   BMI 62.73 kg/m²     General Appearance:    Alert, cooperative, no distress, appears stated age   Head:    Normocephalic, without obvious abnormality, atraumatic   Eyes:    PERRL, conjunctiva/corneas clear, EOM's intact, fundi     benign, both eyes        Ears:    Normal TM's and external ear canals, both ears   Nose:   Nares normal, septum midline, mucosa normal, no drainage    or sinus tenderness   Throat:   Lips, mucosa, and tongue normal; teeth and gums normal   Neck:   Supple, symmetrical, trachea midline, no adenopathy;        thyroid:  No enlargement/tenderness/nodules; no carotid    bruit or JVD   Back:     Symmetric, no curvature, ROM normal, no CVA tenderness   Lungs:     Clear to auscultation bilaterally, respirations unlabored   Chest wall:    No tenderness or deformity   Heart:    Regular rate and rhythm, S1 and S2 normal, no murmur, rub   or gallop   Abdomen:     Soft, non-tender, bowel sounds active all four quadrants,     no masses, no organomegaly   Genitalia:    Normal male without lesion, discharge or tenderness   Rectal:    Normal tone, normal prostate, no masses or tenderness;    guaiac negative stool   Extremities:   Extremities normal, atraumatic, no cyanosis or edema   Pulses:   2+ and symmetric all extremities   Skin:   Skin color, texture, turgor normal, no rashes or lesions   Lymph nodes:   Cervical, supraclavicular, and axillary nodes normal   Neurologic:   CNII-XII intact. Normal strength, sensation and reflexes       throughout       Disposition:   Transferred to F, via [facility]. Medication List      CONTINUE taking these medications    aspirin 81 MG tablet     * insulin lispro 100 UNIT/ML injection vial  Commonly known as:  HUMALOG  Inject 0-6 Units into the skin 3 times daily (with meals)     * insulin lispro 100 UNIT/ML injection vial  Commonly known as:  HUMALOG  Inject 0-3 Units into the skin nightly     Lantus SoloStar 100 UNIT/ML injection pen  Generic drug:  insulin glargine     metoprolol tartrate 25 MG tablet  Commonly known as:  LOPRESSOR     miconazole 2 % powder  Commonly known as:  MICOTIN  Apply topically 2 times daily. OXYGEN     pantoprazole 20 MG tablet  Commonly known as:  PROTONIX     torsemide 20 MG tablet  Commonly known as:  DEMADEX     vitamin D 1000 UNIT Tabs tablet  Commonly known as:  CHOLECALCIFEROL  Take 1 tablet by mouth daily     warfarin 5 MG tablet  Commonly known as:  COUMADIN         * This list has 2 medication(s) that are the same as other medications prescribed for you.  Read the directions carefully, and ask your doctor or other care provider to review them with you.                      Signed:  Miriam Forde D.O.  7/28/2020, 9:12 AM

## 2020-07-29 NOTE — PROGRESS NOTES
Physician Progress Note      PATIENT:               Louie Self  CSN #:                  [id number]  :                       1955  ADMIT DATE:       2020 8:56 PM  [address] [place] DATE:        2020 3:00 PM  RESPONDING  PROVIDER #:        Naga GREEN DO          QUERY TEXT:    Dear Attending Physician,    Patient noted to have CKD documented. If possible, please document in progress   notes and discharge summary if you are evaluating and/or treating any of the   following: The medical record reflects the following:  Risk Factors: advanced age, h/o HTN, HDL  Clinical Indicators: BUN/Cr/GFR: 38/1.6/43 -> 43/2.0/34, per H&P \". Brianmariah Mendoza Chronic   kidney disease stage III to IV. Brian Mendoza Brian Mendoza \"  Treatment: lab monitoring    Thank you,  Abigail Moore RN, BSN, CDIS  Clinical Documentation Improvement  [email or web address]  Options provided:  -- CKD Stage 3 GFR 30-59  -- CKD Stage 4 GFR 15-29  -- Other - I will add my own diagnosis  -- Disagree - Clinically unable to determine / Unknown  -- Refer to Clinical Documentation Reviewer    PROVIDER RESPONSE TEXT:    This patient has CKD Stage 3. Query created by: Osito Levin on 2020 3:01 PM      QUERY TEXT:    Dear Attending Physician,    Pt noted to have left humerus and right fifth metacarpal fracture s/p fall   from standing. If possible, please document in progress notes and discharge   summary if you are evaluating and/or treating any of the following: The medical record reflects the following:  Risk Factors: fell over carpet while walking  Clinical Indicators: left knee x-ray shows osteopenia, per ortho \". ..left   proximal humerus fracture, right fifth metacarpal fracture. Brian Mendoza Brian Mendoza \"  Treatment: orthopedic consult, home Vitamin D continued    Thank you,  Abigail Moore RN, BSN, CDIS  Clinical Documentation Improvement  [email or web address]  Options provided:  -- Osteoporotic left humerus fracture following fall which would not usually   break a normal, healthy bone  -- Traumatic left humerus fracture  -- Other - I will add my own diagnosis  -- Disagree - Clinically unable to determine / Unknown  -- Refer to Clinical Documentation Reviewer    PROVIDER RESPONSE TEXT:    This patient has a traumatic fracture of left humerus fracture.     Query created by: Vannessa Alvarenga on 7/27/2020 3:07 PM      Electronically signed by:  Alyce Love DO 7/28/2020 9:41 PM

## 2021-01-01 LAB
ANISOCYTOSIS: ABNORMAL
BASOPHILIC STIPPLING: ABNORMAL
BASOPHILS ABSOLUTE: 0.05 E9/L (ref 0–0.2)
BASOPHILS RELATIVE PERCENT: 0.9 % (ref 0–2)
EOSINOPHILS ABSOLUTE: 0.09 E9/L (ref 0.05–0.5)
EOSINOPHILS RELATIVE PERCENT: 1.7 % (ref 0–6)
HCT VFR BLD CALC: 43.6 % (ref 37–54)
HEMOGLOBIN: 12.1 G/DL (ref 12.5–16.5)
LYMPHOCYTES ABSOLUTE: 0.61 E9/L (ref 1.5–4)
LYMPHOCYTES RELATIVE PERCENT: 12.2 % (ref 20–42)
MCH RBC QN AUTO: 22.3 PG (ref 26–35)
MCHC RBC AUTO-ENTMCNC: 27.8 % (ref 32–34.5)
MCV RBC AUTO: 80.3 FL (ref 80–99.9)
MONOCYTES ABSOLUTE: 0.2 E9/L (ref 0.1–0.95)
MONOCYTES RELATIVE PERCENT: 3.5 % (ref 2–12)
NEUTROPHILS ABSOLUTE: 4.18 E9/L (ref 1.8–7.3)
NEUTROPHILS RELATIVE PERCENT: 81.7 % (ref 43–80)
OVALOCYTES: ABNORMAL
PDW BLD-RTO: 18.6 FL (ref 11.5–15)
PLATELET # BLD: 293 E9/L (ref 130–450)
PMV BLD AUTO: 9.8 FL (ref 7–12)
POIKILOCYTES: ABNORMAL
POLYCHROMASIA: ABNORMAL
RBC # BLD: 5.43 E12/L (ref 3.8–5.8)
WBC # BLD: 5.1 E9/L (ref 4.5–11.5)

## 2021-06-22 LAB
ALBUMIN SERPL-MCNC: NORMAL G/DL
ALP BLD-CCNC: NORMAL U/L
ALT SERPL-CCNC: NORMAL U/L
ANION GAP SERPL CALCULATED.3IONS-SCNC: NORMAL MMOL/L
AST SERPL-CCNC: NORMAL U/L
AVERAGE GLUCOSE: NORMAL
BASOPHILS ABSOLUTE: NORMAL
BASOPHILS RELATIVE PERCENT: NORMAL
BILIRUB SERPL-MCNC: NORMAL MG/DL
BUN BLDV-MCNC: NORMAL MG/DL
CALCIUM SERPL-MCNC: NORMAL MG/DL
CHLORIDE BLD-SCNC: NORMAL MMOL/L
CHOLESTEROL, TOTAL: NORMAL
CHOLESTEROL/HDL RATIO: NORMAL
CO2: NORMAL
CREAT SERPL-MCNC: NORMAL MG/DL
EOSINOPHILS ABSOLUTE: NORMAL
EOSINOPHILS RELATIVE PERCENT: NORMAL
GFR CALCULATED: NORMAL
GLUCOSE BLD-MCNC: NORMAL MG/DL
HBA1C MFR BLD: 7.1 %
HCT VFR BLD CALC: NORMAL %
HDLC SERPL-MCNC: NORMAL MG/DL
HEMOGLOBIN: NORMAL
LDL CHOLESTEROL CALCULATED: NORMAL
LYMPHOCYTES ABSOLUTE: NORMAL
LYMPHOCYTES RELATIVE PERCENT: NORMAL
MCH RBC QN AUTO: NORMAL PG
MCHC RBC AUTO-ENTMCNC: NORMAL G/DL
MCV RBC AUTO: NORMAL FL
MONOCYTES ABSOLUTE: NORMAL
MONOCYTES RELATIVE PERCENT: NORMAL
NEUTROPHILS ABSOLUTE: NORMAL
NEUTROPHILS RELATIVE PERCENT: NORMAL
NONHDLC SERPL-MCNC: NORMAL MG/DL
PLATELET # BLD: NORMAL 10*3/UL
PMV BLD AUTO: NORMAL FL
POTASSIUM SERPL-SCNC: NORMAL MMOL/L
RBC # BLD: NORMAL 10*6/UL
SODIUM BLD-SCNC: NORMAL MMOL/L
TOTAL PROTEIN: NORMAL
TRIGL SERPL-MCNC: NORMAL MG/DL
VLDLC SERPL CALC-MCNC: NORMAL MG/DL
WBC # BLD: NORMAL 10*3/UL

## 2022-03-28 ENCOUNTER — TELEPHONE (OUTPATIENT)
Dept: FAMILY MEDICINE CLINIC | Age: 67
End: 2022-03-28

## 2022-03-28 NOTE — TELEPHONE ENCOUNTER
----- Message from Raymond Bui sent at 3/26/2022 11:18 AM EDT -----  Subject: Message to Provider    QUESTIONS  Information for Provider? Jermaine Duong from assisted living was calling for pt   pari that was previously seen by dr Carrington Leyden letting clinical staff   know that he is starting therapy soon and has a level 2 drug interaction   between warferin and allopurinol. please call richar if needed  ---------------------------------------------------------------------------  --------------  CALL BACK INFO  What is the best way for the office to contact you? OK to leave message on   voicemail  Preferred Call Back Phone Number? [phone]  ---------------------------------------------------------------------------  --------------  SCRIPT ANSWERS  Relationship to Patient? Third Party  Third Party Type? Home Health Care? Representative Name?  richar mendoza

## 2022-03-28 NOTE — TELEPHONE ENCOUNTER
No action is required at this time. Always monitor for bleeding given the fact that he is on Coumadin. No other issues to address at this time.

## 2022-07-15 RX ORDER — WARFARIN SODIUM 5 MG/1
5 TABLET ORAL DAILY
Qty: 90 TABLET | Refills: 0 | Status: CANCELLED | OUTPATIENT
Start: 2022-07-15

## 2022-07-15 NOTE — TELEPHONE ENCOUNTER
Patient needs to get established with me before refill on his Coumadin. He is homebound so we will likely have to do a virtual visit through the [employer] [facility].

## 2022-07-20 ENCOUNTER — TELEMEDICINE (OUTPATIENT)
Dept: FAMILY MEDICINE CLINIC | Age: 67
End: 2022-07-20
Payer: MEDICARE

## 2022-07-20 DIAGNOSIS — E11.65 UNCONTROLLED TYPE 2 DIABETES MELLITUS WITH HYPERGLYCEMIA (HCC): ICD-10-CM

## 2022-07-20 DIAGNOSIS — Z86.711 HISTORY OF PULMONARY EMBOLUS (PE): Primary | ICD-10-CM

## 2022-07-20 DIAGNOSIS — I26.99 PULMONARY EMBOLISM, BILATERAL (HCC): ICD-10-CM

## 2022-07-20 PROCEDURE — 1123F ACP DISCUSS/DSCN MKR DOCD: CPT | Performed by: NEUROMUSCULOSKELETAL MEDICINE & OMM

## 2022-07-20 PROCEDURE — 99202 OFFICE O/P NEW SF 15 MIN: CPT | Performed by: NEUROMUSCULOSKELETAL MEDICINE & OMM

## 2022-07-20 RX ORDER — WARFARIN SODIUM 5 MG/1
5 TABLET ORAL DAILY
Qty: 90 TABLET | Refills: 2 | Status: SHIPPED | OUTPATIENT
Start: 2022-07-20

## 2022-07-20 SDOH — ECONOMIC STABILITY: FOOD INSECURITY: WITHIN THE PAST 12 MONTHS, YOU WORRIED THAT YOUR FOOD WOULD RUN OUT BEFORE YOU GOT MONEY TO BUY MORE.: NEVER TRUE

## 2022-07-20 SDOH — ECONOMIC STABILITY: FOOD INSECURITY: WITHIN THE PAST 12 MONTHS, THE FOOD YOU BOUGHT JUST DIDN'T LAST AND YOU DIDN'T HAVE MONEY TO GET MORE.: NEVER TRUE

## 2022-07-20 ASSESSMENT — ENCOUNTER SYMPTOMS
COUGH: 0
VOMITING: 0
SHORTNESS OF BREATH: 0
STRIDOR: 0
CHOKING: 0
NAUSEA: 0
ABDOMINAL PAIN: 0
CHEST TIGHTNESS: 0

## 2022-07-20 ASSESSMENT — SOCIAL DETERMINANTS OF HEALTH (SDOH): HOW HARD IS IT FOR YOU TO PAY FOR THE VERY BASICS LIKE FOOD, HOUSING, MEDICAL CARE, AND HEATING?: NOT HARD AT ALL

## 2022-07-20 ASSESSMENT — PATIENT HEALTH QUESTIONNAIRE - PHQ9
SUM OF ALL RESPONSES TO PHQ QUESTIONS 1-9: 0
1. LITTLE INTEREST OR PLEASURE IN DOING THINGS: 0
2. FEELING DOWN, DEPRESSED OR HOPELESS: 0
SUM OF ALL RESPONSES TO PHQ9 QUESTIONS 1 & 2: 0
SUM OF ALL RESPONSES TO PHQ QUESTIONS 1-9: 0

## 2022-07-20 NOTE — PROGRESS NOTES
Susana Pepper (:  1955) is a New patient, here for evaluation of the following:    Assessment & Plan   Below is the assessment and plan developed based on review of pertinent history, physical exam, labs, studies, and medications. Establish care    History of pulmonary embolism    Chronic diastolic heart failure    Type 2 diabetes uncontrolled    Morbid obesity    Chronic kidney disease stage III being followed at [address] [place] [address] Dr per nephrology    Coronary artery disease status post stent placement multiple done at [facility]    Hyperlipidemia    Obstructive sleep apnea    Severe physical deconditioning        No follow-ups on file. Subjective   HPI 24-year-old male via virtual visit to establish care. Patient with history of coronary artery disease status post stent placement, uncontrolled type 2 diabetes, morbid obesity, chronic stage III, and severe general deconditioning. Patient was recently hospitalized at [facility] due to weakness and general deconditioning he had a fractured left humerus and shoulder some months ago. He was seen by orthopedics there and they did not recommend surgery due to his obesity per patient's account. Patient was subsequently discharged to [facility] at [facility] for 2 weeks which did show improvement in his ambulation. Patient is bed ridden due to his morbid obesity. BMI is 62.73. Patient is requesting refills on some of his medications I will need to call his pharmacy [facility] on [address] to verify. Would also like some fasting blood work done. I will consult [facility] in this regard since he has bed ridden. And would also like continued physical therapy on his left upper extremity. Review of Systems   Constitutional:  Positive for activity change. Negative for appetite change and unexpected weight change. Eyes:  Negative for visual disturbance.    Respiratory: Negative for cough, choking, chest tightness, shortness of breath and stridor. Cardiovascular:  Negative for chest pain and palpitations. Gastrointestinal:  Negative for abdominal pain, nausea and vomiting.         Objective   Patient-Reported Vitals  Patient-Reported Weight: 424  Patient-Reported Height: 5'9       Physical Exam  [INSTRUCTIONS:  \"[x]\" Indicates a positive item  \"[]\" Indicates a negative item  -- DELETE ALL ITEMS NOT EXAMINED]    Constitutional: [x] Appears well-developed and well-nourished [x] No apparent distress      [] Abnormal -     Mental status: [x] Alert and awake  [x] Oriented to person/place/time [x] Able to follow commands    [] Abnormal -     Eyes:   EOM    [x]  Normal    [] Abnormal -   Sclera  [x]  Normal    [] Abnormal -          Discharge [x]  None visible   [] Abnormal -     HENT: [x] Normocephalic, atraumatic  [] Abnormal -   [x] Mouth/Throat: Mucous membranes are moist    External Ears [x] Normal  [] Abnormal -    Neck: [x] No visualized mass [] Abnormal -     Pulmonary/Chest: [x] Respiratory effort normal   [x] No visualized signs of difficulty breathing or respiratory distress        [] Abnormal -      Musculoskeletal:   [x] Normal gait with no signs of ataxia         [x] Normal range of motion of neck        [] Abnormal -     Neurological:        [x] No Facial Asymmetry (Cranial nerve 7 motor function) (limited exam due to video visit)          [x] No gaze palsy        [] Abnormal -          Skin:        [x] No significant exanthematous lesions or discoloration noted on facial skin         [] Abnormal -            Psychiatric:       [x] Normal Affect [] Abnormal -        [x] No Hallucinations    Other pertinent observable physical exam findings:-         On this date 7/20/2022 I have spent 20 minutes reviewing previous notes, test results and face to face (virtual) with the patient discussing the diagnosis and importance of compliance with the treatment plan as well as documenting on the day of the visit. Eunice Richey, was evaluated through a synchronous (real-time) audio-video encounter. The patient (or guardian if applicable) is aware that this is a billable service, which includes applicable co-pays. This Virtual Visit was conducted with patient's (and/or legal guardian's) consent. The visit was conducted pursuant to the emergency declaration under the [address] [place] [address] authority and the [employer] General Act. Patient identification was verified, and a caregiver was present when appropriate. The patient was located at Home: [place] [id number]. Provider was located at [facility] ([address] [place] [address]): [address] [place] [address]., [address] [place] [address],  [facility].         --Hernandez Brooks, DO

## 2022-07-27 ENCOUNTER — TELEPHONE (OUTPATIENT)
Dept: FAMILY MEDICINE CLINIC | Age: 67
End: 2022-07-27

## 2022-07-27 NOTE — TELEPHONE ENCOUNTER
Jossie from [address] [place] [address] called and stated that Mr. Jarad Zarco was scheduled to start [address] [place] [address] on Aug 1.

## 2022-08-01 LAB
ALBUMIN SERPL-MCNC: 3.4 G/DL (ref 3.5–5.2)
ALP BLD-CCNC: 75 U/L (ref 40–129)
ALT SERPL-CCNC: 9 U/L (ref 0–40)
ANION GAP SERPL CALCULATED.3IONS-SCNC: 8 MMOL/L (ref 7–16)
AST SERPL-CCNC: 13 U/L (ref 0–39)
BACTERIA: ABNORMAL /HPF
BILIRUB SERPL-MCNC: 0.6 MG/DL (ref 0–1.2)
BILIRUBIN URINE: NEGATIVE
BLOOD, URINE: NEGATIVE
BUN BLDV-MCNC: 42 MG/DL (ref 6–23)
CALCIUM SERPL-MCNC: 9 MG/DL (ref 8.6–10.2)
CHLORIDE BLD-SCNC: 103 MMOL/L (ref 98–107)
CLARITY: CLEAR
CO2: 33 MMOL/L (ref 22–29)
COLOR: YELLOW
CREAT SERPL-MCNC: 1.8 MG/DL (ref 0.7–1.2)
EPITHELIAL CELLS, UA: ABNORMAL /HPF
GFR AFRICAN AMERICAN: 46
GFR NON-AFRICAN AMERICAN: 38 ML/MIN/1.73
GLUCOSE BLD-MCNC: 177 MG/DL (ref 74–99)
GLUCOSE URINE: 100 MG/DL
HBA1C MFR BLD: 9 % (ref 4–5.6)
HCT VFR BLD CALC: 52 % (ref 37–54)
HEMOGLOBIN: 15.2 G/DL (ref 12.5–16.5)
KETONES, URINE: NEGATIVE MG/DL
LEUKOCYTE ESTERASE, URINE: NEGATIVE
MCH RBC QN AUTO: 21.8 PG (ref 26–35)
MCHC RBC AUTO-ENTMCNC: 29.2 % (ref 32–34.5)
MCV RBC AUTO: 74.7 FL (ref 80–99.9)
NITRITE, URINE: NEGATIVE
PDW BLD-RTO: 21.4 FL (ref 11.5–15)
PH UA: 6 (ref 5–9)
PLATELET # BLD: 436 E9/L (ref 130–450)
PMV BLD AUTO: 9.9 FL (ref 7–12)
POTASSIUM SERPL-SCNC: 5.4 MMOL/L (ref 3.5–5)
PROSTATE SPECIFIC ANTIGEN: 0.4 NG/ML (ref 0–4)
PROTEIN UA: >=300 MG/DL
RBC # BLD: 6.96 E12/L (ref 3.8–5.8)
RBC UA: ABNORMAL /HPF (ref 0–2)
SODIUM BLD-SCNC: 144 MMOL/L (ref 132–146)
SPECIFIC GRAVITY UA: 1.02 (ref 1–1.03)
TOTAL PROTEIN: 6.6 G/DL (ref 6.4–8.3)
UROBILINOGEN, URINE: 0.2 E.U./DL
WBC # BLD: 6.9 E9/L (ref 4.5–11.5)
WBC UA: ABNORMAL /HPF (ref 0–5)

## 2022-08-01 NOTE — RESULT ENCOUNTER NOTE
Let patient know that his kidney function has declined. His GFR is 38 1 year ago it was 55. Patient does see nephrologist at [address] [place] [address]  we will need to forward this information to him please get appropriate information to do so.

## 2022-08-03 ENCOUNTER — TELEPHONE (OUTPATIENT)
Dept: FAMILY MEDICINE CLINIC | Age: 67
End: 2022-08-03

## 2022-08-03 DIAGNOSIS — R53.81 PHYSICAL DECONDITIONING: Primary | ICD-10-CM

## 2022-08-03 RX ORDER — RESVER/WINE/BFL/GRPSD/PC/C/POM 200MG-60MG
CAPSULE ORAL
Qty: 90 TABLET | Refills: 3 | Status: CANCELLED | OUTPATIENT
Start: 2022-08-03

## 2022-08-03 RX ORDER — RESVER/WINE/BFL/GRPSD/PC/C/POM 200MG-60MG
CAPSULE ORAL
COMMUNITY
Start: 2022-04-28 | End: 2022-08-19 | Stop reason: SDUPTHER

## 2022-08-03 NOTE — TELEPHONE ENCOUNTER
Referral was received and Pt was evaluated for PT and will be getting it twice a week for 4 weeks. Patients would like an OT consult eval too as they are concerned about hygiene. Please Advise jorge @ [phone] if he can do this.

## 2022-08-09 ENCOUNTER — TELEPHONE (OUTPATIENT)
Dept: FAMILY MEDICINE CLINIC | Age: 67
End: 2022-08-09

## 2022-08-09 LAB — INR BLD: 2

## 2022-08-09 NOTE — TELEPHONE ENCOUNTER
[facility] called PT INR, 23.8/2.0,,, any changes call Soraya Like [phone]

## 2022-08-10 ENCOUNTER — ANTI-COAG VISIT (OUTPATIENT)
Dept: FAMILY MEDICINE CLINIC | Age: 67
End: 2022-08-10
Payer: MEDICARE

## 2022-08-10 DIAGNOSIS — I26.99 PULMONARY EMBOLISM, BILATERAL (HCC): Primary | ICD-10-CM

## 2022-08-10 PROCEDURE — 93793 ANTICOAG MGMT PT WARFARIN: CPT | Performed by: NEUROMUSCULOSKELETAL MEDICINE & OMM

## 2022-08-10 NOTE — TELEPHONE ENCOUNTER
Pt is adjusting medication at home when INR levels are high. The nurse stated he checks his own Inr at home and when levels are high he will take 2.5 mg and when its normal he takes 5 mg .

## 2022-08-15 DIAGNOSIS — M10.9 GOUTY ARTHRITIS: Primary | ICD-10-CM

## 2022-08-15 RX ORDER — ALLOPURINOL 100 MG/1
100 TABLET ORAL DAILY
Qty: 90 TABLET | Refills: 1 | Status: SHIPPED | OUTPATIENT
Start: 2022-08-15

## 2022-08-16 ENCOUNTER — TELEPHONE (OUTPATIENT)
Dept: FAMILY MEDICINE CLINIC | Age: 67
End: 2022-08-16

## 2022-08-16 DIAGNOSIS — E78.5 HYPERLIPIDEMIA LDL GOAL <100: ICD-10-CM

## 2022-08-16 DIAGNOSIS — Z86.2 HISTORY OF HYPOCHROMIC MICROCYTIC ANEMIA: ICD-10-CM

## 2022-08-16 DIAGNOSIS — N18.31 STAGE 3A CHRONIC KIDNEY DISEASE (HCC): ICD-10-CM

## 2022-08-16 DIAGNOSIS — I10 ESSENTIAL HYPERTENSION: ICD-10-CM

## 2022-08-16 DIAGNOSIS — E66.01 MORBID OBESITY WITH BMI OF 60.0-69.9, ADULT (HCC): Primary | ICD-10-CM

## 2022-08-16 DIAGNOSIS — E11.65 UNCONTROLLED TYPE 2 DIABETES MELLITUS WITH HYPERGLYCEMIA (HCC): ICD-10-CM

## 2022-08-16 DIAGNOSIS — I50.31 ACUTE DIASTOLIC HEART FAILURE (HCC): Primary | ICD-10-CM

## 2022-08-16 DIAGNOSIS — I26.99 PULMONARY EMBOLISM, BILATERAL (HCC): ICD-10-CM

## 2022-08-16 DIAGNOSIS — I25.10 CORONARY ARTERY DISEASE INVOLVING NATIVE CORONARY ARTERY OF NATIVE HEART WITHOUT ANGINA PECTORIS: ICD-10-CM

## 2022-08-16 LAB
ALBUMIN SERPL-MCNC: 3.5 G/DL (ref 3.5–5.2)
ALP BLD-CCNC: 75 U/L (ref 40–129)
ALT SERPL-CCNC: 10 U/L (ref 0–40)
ANION GAP SERPL CALCULATED.3IONS-SCNC: 14 MMOL/L (ref 7–16)
AST SERPL-CCNC: 22 U/L (ref 0–39)
BILIRUB SERPL-MCNC: 0.4 MG/DL (ref 0–1.2)
BUN BLDV-MCNC: 40 MG/DL (ref 6–23)
CALCIUM SERPL-MCNC: 9.5 MG/DL (ref 8.6–10.2)
CHLORIDE BLD-SCNC: 103 MMOL/L (ref 98–107)
CO2: 24 MMOL/L (ref 22–29)
CREAT SERPL-MCNC: 1.6 MG/DL (ref 0.7–1.2)
FERRITIN: 34 NG/ML
GFR AFRICAN AMERICAN: 52
GFR NON-AFRICAN AMERICAN: 43 ML/MIN/1.73
GLUCOSE BLD-MCNC: 163 MG/DL (ref 74–99)
HCT VFR BLD CALC: 52.1 % (ref 37–54)
HEMOGLOBIN: 15.2 G/DL (ref 12.5–16.5)
IRON SATURATION: 27 % (ref 20–55)
IRON: 79 MCG/DL (ref 59–158)
MCH RBC QN AUTO: 22 PG (ref 26–35)
MCHC RBC AUTO-ENTMCNC: 29.2 % (ref 32–34.5)
MCV RBC AUTO: 75.5 FL (ref 80–99.9)
PDW BLD-RTO: 21.6 FL (ref 11.5–15)
PLATELET # BLD: 361 E9/L (ref 130–450)
PMV BLD AUTO: 10 FL (ref 7–12)
POTASSIUM SERPL-SCNC: 5.8 MMOL/L (ref 3.5–5)
PRO-BNP: 284 PG/ML (ref 0–125)
RBC # BLD: 6.9 E12/L (ref 3.8–5.8)
SODIUM BLD-SCNC: 141 MMOL/L (ref 132–146)
TOTAL IRON BINDING CAPACITY: 292 MCG/DL (ref 250–450)
TOTAL PROTEIN: 6.7 G/DL (ref 6.4–8.3)
WBC # BLD: 7.4 E9/L (ref 4.5–11.5)

## 2022-08-16 NOTE — TELEPHONE ENCOUNTER
25. 9/ 2.2 for PT INR only took 2.5 yesterday     Pt wants Pro-BMP today. It was drawn please put order in.  Pt was not fasting so Lipid Panel was not this week

## 2022-08-19 DIAGNOSIS — I10 ESSENTIAL HYPERTENSION: ICD-10-CM

## 2022-08-19 DIAGNOSIS — I10 ESSENTIAL HYPERTENSION: Primary | ICD-10-CM

## 2022-08-19 DIAGNOSIS — E55.9 VITAMIN D DEFICIENCY: ICD-10-CM

## 2022-08-19 RX ORDER — RESVER/WINE/BFL/GRPSD/PC/C/POM 200MG-60MG
CAPSULE ORAL
Qty: 30 TABLET | Refills: 5 | Status: SHIPPED | OUTPATIENT
Start: 2022-08-19

## 2022-08-23 ENCOUNTER — TELEPHONE (OUTPATIENT)
Dept: FAMILY MEDICINE CLINIC | Age: 67
End: 2022-08-23

## 2022-08-23 ENCOUNTER — ANTI-COAG VISIT (OUTPATIENT)
Dept: FAMILY MEDICINE CLINIC | Age: 67
End: 2022-08-23

## 2022-08-23 DIAGNOSIS — Z86.711 HISTORY OF PULMONARY EMBOLUS (PE): Primary | ICD-10-CM

## 2022-08-23 LAB — INR BLD: 3.3

## 2022-08-23 NOTE — PROGRESS NOTES
Fiordaliza Prieto 5 hours ago (11:32 AM)     RK  Pt inr called [phone]  inr 39.6 3.3. pt dose 2 1/2 on 08/22/22.          Note

## 2022-09-06 ENCOUNTER — TELEPHONE (OUTPATIENT)
Dept: FAMILY MEDICINE CLINIC | Age: 67
End: 2022-09-06

## 2022-09-06 NOTE — TELEPHONE ENCOUNTER
Per Dr Claudia Leon pt needs CBC, CMP, Lipid, BNP, and PT/INR tomorrow when visiting nurse has her visit with him. Spoke with [facility] and gave orders.

## 2022-09-06 NOTE — TELEPHONE ENCOUNTER
Pt fell over the weekend at home, hurt B/L knees and R shoulder as well into cervical   no swelling no lack of range of motion, FYI

## 2022-09-07 LAB
ALBUMIN SERPL-MCNC: 3.3 G/DL (ref 3.5–5.2)
ALP BLD-CCNC: 62 U/L (ref 40–129)
ALT SERPL-CCNC: 5 U/L (ref 0–40)
ANION GAP SERPL CALCULATED.3IONS-SCNC: 8 MMOL/L (ref 7–16)
AST SERPL-CCNC: 16 U/L (ref 0–39)
BILIRUB SERPL-MCNC: 0.6 MG/DL (ref 0–1.2)
BUN BLDV-MCNC: 30 MG/DL (ref 6–23)
CALCIUM SERPL-MCNC: 9.3 MG/DL (ref 8.6–10.2)
CHLORIDE BLD-SCNC: 103 MMOL/L (ref 98–107)
CHOLESTEROL, TOTAL: 180 MG/DL (ref 0–199)
CO2: 32 MMOL/L (ref 22–29)
CREAT SERPL-MCNC: 1.3 MG/DL (ref 0.7–1.2)
GFR AFRICAN AMERICAN: >60
GFR NON-AFRICAN AMERICAN: 55 ML/MIN/1.73
GLUCOSE BLD-MCNC: 88 MG/DL (ref 74–99)
HCT VFR BLD CALC: 54 % (ref 37–54)
HDLC SERPL-MCNC: 39 MG/DL
HEMOGLOBIN: 15.6 G/DL (ref 12.5–16.5)
INR BLD: 2.2
LDL CHOLESTEROL CALCULATED: 115 MG/DL (ref 0–99)
MCH RBC QN AUTO: 22.7 PG (ref 26–35)
MCHC RBC AUTO-ENTMCNC: 28.9 % (ref 32–34.5)
MCV RBC AUTO: 78.7 FL (ref 80–99.9)
PDW BLD-RTO: 20.5 FL (ref 11.5–15)
PLATELET # BLD: 416 E9/L (ref 130–450)
PMV BLD AUTO: 10.4 FL (ref 7–12)
POTASSIUM SERPL-SCNC: 5 MMOL/L (ref 3.5–5)
PRO-BNP: 385 PG/ML (ref 0–125)
PROTHROMBIN TIME: 24.2 SEC (ref 9.3–12.4)
RBC # BLD: 6.86 E12/L (ref 3.8–5.8)
SODIUM BLD-SCNC: 143 MMOL/L (ref 132–146)
TOTAL PROTEIN: 6.4 G/DL (ref 6.4–8.3)
TRIGL SERPL-MCNC: 131 MG/DL (ref 0–149)
VLDLC SERPL CALC-MCNC: 26 MG/DL
WBC # BLD: 5.8 E9/L (ref 4.5–11.5)

## 2022-09-08 DIAGNOSIS — G57.01 NEUROPATHY OF RIGHT SCIATIC NERVE: Primary | ICD-10-CM

## 2022-09-08 DIAGNOSIS — E78.5 HYPERLIPIDEMIA LDL GOAL <100: ICD-10-CM

## 2022-09-08 RX ORDER — ROSUVASTATIN CALCIUM 10 MG/1
10 TABLET, COATED ORAL NIGHTLY
Qty: 30 TABLET | Refills: 3 | Status: SHIPPED
Start: 2022-09-08 | End: 2022-09-09 | Stop reason: SDUPTHER

## 2022-09-08 RX ORDER — METHYLPREDNISOLONE 4 MG/1
TABLET ORAL
Qty: 21 TABLET | Refills: 0 | Status: SHIPPED | OUTPATIENT
Start: 2022-09-08 | End: 2022-09-14

## 2022-09-08 NOTE — PROGRESS NOTES
Patient called today complaining of lower back pain with right-sided sciatica. He has tried but failed to improve on nonsteroidals. I told the patient I would call in a Medrol Dosepak 4 mg dispense 1 as directed with no refills. Advised him he may need to adjust insulin due to the steroids elevating his blood sugar. He is an insulin-dependent diabetic. The prescription will be sent to [facility] on [address] in [facility].

## 2022-09-09 RX ORDER — ROSUVASTATIN CALCIUM 10 MG/1
10 TABLET, COATED ORAL NIGHTLY
Qty: 90 TABLET | Refills: 1 | Status: SHIPPED | OUTPATIENT
Start: 2022-09-09

## 2022-09-10 DIAGNOSIS — K21.9 GASTROESOPHAGEAL REFLUX DISEASE WITHOUT ESOPHAGITIS: Primary | ICD-10-CM

## 2022-09-10 RX ORDER — PANTOPRAZOLE SODIUM 20 MG/1
20 TABLET, DELAYED RELEASE ORAL DAILY
Qty: 90 TABLET | Refills: 2 | Status: SHIPPED | OUTPATIENT
Start: 2022-09-10

## 2022-09-19 ENCOUNTER — TELEPHONE (OUTPATIENT)
Dept: FAMILY MEDICINE CLINIC | Age: 67
End: 2022-09-19

## 2022-09-19 NOTE — TELEPHONE ENCOUNTER
Notified Maureen @ [facility] that pt needs evaluated tomorrow for abscess in buttocks.

## 2022-09-20 ENCOUNTER — TELEPHONE (OUTPATIENT)
Dept: FAMILY MEDICINE CLINIC | Age: 67
End: 2022-09-20

## 2022-09-20 NOTE — TELEPHONE ENCOUNTER
Pt went to [facility] for an abbess on back side, but they admitted him because of elevated  BP

## 2022-09-30 ENCOUNTER — TELEPHONE (OUTPATIENT)
Dept: FAMILY MEDICINE CLINIC | Age: 67
End: 2022-09-30

## 2022-09-30 NOTE — TELEPHONE ENCOUNTER
Padma Camp is going over his chart for quality audit and the chart is lacking information about his diabetes. She wants to know if he every say a specialist for this condition. Please call her with any information that can help this this chart.

## 2022-10-03 NOTE — TELEPHONE ENCOUNTER
Patient sees a cardiologist and nephrologist at [facility]. He does not see an endocrinologist for his diabetes.

## 2022-12-13 ENCOUNTER — TELEPHONE (OUTPATIENT)
Dept: FAMILY MEDICINE CLINIC | Age: 67
End: 2022-12-13

## 2022-12-14 NOTE — TELEPHONE ENCOUNTER
Patient wants to know if PCP will give him a call.  Tried to speak with patient for more info but pt only wants to speak with PCP, please advise

## 2022-12-30 ENCOUNTER — TELEPHONE (OUTPATIENT)
Dept: FAMILY MEDICINE CLINIC | Age: 67
End: 2022-12-30

## 2022-12-30 NOTE — TELEPHONE ENCOUNTER
Aliya Caraballo calling to let you know that pt was admitted there and will be there a couple of weeks.  geetha

## 2023-02-02 ENCOUNTER — TELEPHONE (OUTPATIENT)
Dept: FAMILY MEDICINE CLINIC | Age: 68
End: 2023-02-02

## 2023-02-20 DIAGNOSIS — E11.65 UNCONTROLLED TYPE 2 DIABETES MELLITUS WITH HYPERGLYCEMIA (HCC): Primary | ICD-10-CM

## 2023-02-20 RX ORDER — GLIPIZIDE 5 MG/1
5 TABLET ORAL DAILY
Qty: 30 TABLET | Refills: 5 | Status: SHIPPED | OUTPATIENT
Start: 2023-02-20

## 2023-03-15 LAB
ALBUMIN SERPL-MCNC: 4 G/DL (ref 3.5–5.2)
ALP SERPL-CCNC: 60 U/L (ref 40–129)
ALT SERPL-CCNC: 13 U/L (ref 0–40)
ANION GAP SERPL CALCULATED.3IONS-SCNC: 8 MMOL/L (ref 7–16)
AST SERPL-CCNC: 15 U/L (ref 0–39)
BASOPHILS # BLD: 0.04 E9/L (ref 0–0.2)
BASOPHILS NFR BLD: 0.8 % (ref 0–2)
BILIRUB SERPL-MCNC: 0.6 MG/DL (ref 0–1.2)
BNP BLD-MCNC: 398 PG/ML (ref 0–125)
BUN SERPL-MCNC: 37 MG/DL (ref 6–23)
CALCIUM SERPL-MCNC: 9.7 MG/DL (ref 8.6–10.2)
CHLORIDE SERPL-SCNC: 104 MMOL/L (ref 98–107)
CO2 SERPL-SCNC: 31 MMOL/L (ref 22–29)
CREAT SERPL-MCNC: 1.4 MG/DL (ref 0.7–1.2)
EOSINOPHIL # BLD: 0.16 E9/L (ref 0.05–0.5)
EOSINOPHIL NFR BLD: 3.1 % (ref 0–6)
ERYTHROCYTE [DISTWIDTH] IN BLOOD BY AUTOMATED COUNT: 17.9 FL (ref 11.5–15)
GLUCOSE SERPL-MCNC: 93 MG/DL (ref 74–99)
HCT VFR BLD AUTO: 54 % (ref 37–54)
HGB BLD-MCNC: 15.8 G/DL (ref 12.5–16.5)
IMM GRANULOCYTES # BLD: 0.02 E9/L
IMM GRANULOCYTES NFR BLD: 0.4 % (ref 0–5)
LYMPHOCYTES # BLD: 0.95 E9/L (ref 1.5–4)
LYMPHOCYTES NFR BLD: 18.2 % (ref 20–42)
MCH RBC QN AUTO: 24.5 PG (ref 26–35)
MCHC RBC AUTO-ENTMCNC: 29.3 % (ref 32–34.5)
MCV RBC AUTO: 83.7 FL (ref 80–99.9)
MONOCYTES # BLD: 0.33 E9/L (ref 0.1–0.95)
MONOCYTES NFR BLD: 6.3 % (ref 2–12)
NEUTROPHILS # BLD: 3.72 E9/L (ref 1.8–7.3)
NEUTS SEG NFR BLD: 71.2 % (ref 43–80)
PLATELET # BLD AUTO: 343 E9/L (ref 130–450)
PMV BLD AUTO: 10.7 FL (ref 7–12)
POTASSIUM SERPL-SCNC: 4.6 MMOL/L (ref 3.5–5)
PROT SERPL-MCNC: 6.4 G/DL (ref 6.4–8.3)
RBC # BLD AUTO: 6.45 E12/L (ref 3.8–5.8)
SODIUM SERPL-SCNC: 143 MMOL/L (ref 132–146)
VITAMIN D 25-HYDROXY: 69 NG/ML (ref 30–100)
WBC # BLD: 5.2 E9/L (ref 4.5–11.5)

## 2023-03-16 LAB — HBA1C MFR BLD: 5.5 % (ref 4–5.6)

## 2023-04-24 ENCOUNTER — TELEPHONE (OUTPATIENT)
Dept: FAMILY MEDICINE CLINIC | Age: 68
End: 2023-04-24

## 2023-04-24 DIAGNOSIS — E61.1 IRON DEFICIENCY: Primary | ICD-10-CM

## 2023-04-24 RX ORDER — LANOLIN ALCOHOL/MO/W.PET/CERES
325 CREAM (GRAM) TOPICAL
Qty: 30 TABLET | Refills: 5 | Status: SHIPPED | OUTPATIENT
Start: 2023-04-24

## 2023-04-24 NOTE — TELEPHONE ENCOUNTER
Received recent blood work from [address] which shows a ferritin level of 13, ferritin saturation 14, TIBC 318, and serum iron 45. I will write a prescription for ferrous sulfate 325 mg 1 by mouth daily dispense 30 with 5 refills to patient's pharmacy. We will need to recheck iron studies including ferritin TIBC and serum iron in 8 weeks and adjust medications accordingly.

## 2023-04-28 NOTE — TELEPHONE ENCOUNTER
The patient, Iggy Vital called regarding refill on his Medrol Dosepak 4 mg as directed patient over the last 2 days has been having an aggravation of his sciatica. The Medrol Dosepak has helped in the past.  I called in a Medrol Dosepak 4 mg, dispense1 as directed with 1 refill to [facility] on [address]. Prescription called in on April 28, 2023 left on voicemail at [phone].

## 2023-05-01 ENCOUNTER — TELEPHONE (OUTPATIENT)
Dept: FAMILY MEDICINE CLINIC | Age: 68
End: 2023-05-01

## 2023-05-05 ENCOUNTER — TELEPHONE (OUTPATIENT)
Dept: FAMILY MEDICINE CLINIC | Age: 68
End: 2023-05-05

## 2023-05-05 DIAGNOSIS — M54.41 ACUTE RIGHT-SIDED LOW BACK PAIN WITH RIGHT-SIDED SCIATICA: Primary | ICD-10-CM

## 2023-05-05 NOTE — TELEPHONE ENCOUNTER
Patient recently completed a course of Medrol Dosepak 4 mg as directed with about a 50% improvement in his right-sided sciatica. After a short period of time of being off the medicine his sciatica returned rating the pain a 8-9 out of 10 in intensity with activity/ambulation. Once he is seated it goes down to a 5-6 out of 10 in intensity. I will call in Voltaren 50 mg 1 by mouth twice a day for only 7 days, dispensed 14 with no refill. We will put him on a short course of the Voltaren due to his chronic kidney disease. Patient is homebound and is unable to make office visits at this time.

## 2023-05-19 ENCOUNTER — CLINICAL DOCUMENTATION (OUTPATIENT)
Dept: FAMILY MEDICINE CLINIC | Age: 68
End: 2023-05-19

## 2023-05-19 DIAGNOSIS — R30.0 DYSURIA: Primary | ICD-10-CM

## 2023-05-19 RX ORDER — CIPROFLOXACIN 500 MG/1
500 TABLET, FILM COATED ORAL 2 TIMES DAILY
Qty: 14 TABLET | Refills: 0 | Status: SHIPPED | OUTPATIENT
Start: 2023-05-19 | End: 2023-05-26

## 2023-05-19 NOTE — PROGRESS NOTES
Patient Michelle Chow called me this morning he is homebound and bedridden. He is having urinary symptoms of urgency and not able to completely void. When he does attempt to void \"very little\" is coming out. He does admit to some intermittent burning this has been the last 1 to 2 days. He was recently on a steroid pack for his bilateral back pain with sciatica. I will call him in Cipro 500 mg 1 by mouth twice a day for 7 days to his pharmacy at [place] on [address] in [facility] [place] [facility]". If no better will recommend either a virtual visit for further recommendations.

## 2023-07-11 DIAGNOSIS — K21.9 GASTROESOPHAGEAL REFLUX DISEASE WITHOUT ESOPHAGITIS: ICD-10-CM

## 2023-07-11 RX ORDER — PANTOPRAZOLE SODIUM 20 MG/1
20 TABLET, DELAYED RELEASE ORAL DAILY
Qty: 90 TABLET | Refills: 2 | Status: SHIPPED | OUTPATIENT
Start: 2023-07-11

## 2023-07-24 DIAGNOSIS — I10 ESSENTIAL HYPERTENSION: ICD-10-CM

## 2023-08-14 ENCOUNTER — ANTI-COAG VISIT (OUTPATIENT)
Dept: FAMILY MEDICINE CLINIC | Age: 68
End: 2023-08-14

## 2023-08-14 PROBLEM — Z86.711 HISTORY OF PULMONARY EMBOLUS (PE): Chronic | Status: RESOLVED | Noted: 2017-04-25 | Resolved: 2023-08-14

## 2023-08-24 DIAGNOSIS — E11.65 UNCONTROLLED TYPE 2 DIABETES MELLITUS WITH HYPERGLYCEMIA (HCC): ICD-10-CM

## 2023-08-24 RX ORDER — GLIPIZIDE 5 MG/1
5 TABLET ORAL DAILY
Qty: 30 TABLET | Refills: 5 | Status: SHIPPED
Start: 2023-08-24 | End: 2023-08-24 | Stop reason: SDUPTHER

## 2023-08-24 RX ORDER — GLIPIZIDE 5 MG/1
5 TABLET ORAL DAILY
Qty: 90 TABLET | Refills: 2 | Status: SHIPPED | OUTPATIENT
Start: 2023-08-24

## 2023-09-04 ENCOUNTER — TELEPHONE (OUTPATIENT)
Dept: FAMILY MEDICINE CLINIC | Age: 68
End: 2023-09-04

## 2023-09-04 DIAGNOSIS — R39.15 URINARY URGENCY: Primary | ICD-10-CM

## 2023-09-04 RX ORDER — CIPROFLOXACIN 500 MG/1
500 TABLET, FILM COATED ORAL 2 TIMES DAILY
Qty: 14 TABLET | Refills: 0 | Status: SHIPPED
Start: 2023-09-04 | End: 2023-09-08

## 2023-09-04 NOTE — TELEPHONE ENCOUNTER
Patient called stating that he had urinary urgency frequency over the last day and a day and a half. He denies any burning with urination. Just today his urine flow was decreased significantly. I will call in a prescription for Cipro 500 mg 1 by mouth twice a day for 7 days, dispense 14 with no refill to [facility] on [address] in [place]. Patient is homebound. If signs and symptoms do not improve would need ER visit or home health evaluation.   Dr. Donnelly Abt

## 2023-09-06 LAB
ALBUMIN SERPL-MCNC: 3.5 G/DL (ref 3.5–5.2)
ALP SERPL-CCNC: 63 U/L (ref 40–129)
ALT SERPL-CCNC: 10 U/L (ref 0–40)
ANION GAP SERPL CALCULATED.3IONS-SCNC: 7 MMOL/L (ref 7–16)
AST SERPL-CCNC: 12 U/L (ref 0–39)
BACTERIA URNS QL MICRO: ABNORMAL
BASOPHILS # BLD: 0 K/UL (ref 0–0.2)
BASOPHILS NFR BLD: 0 % (ref 0–2)
BILIRUB SERPL-MCNC: 0.6 MG/DL (ref 0–1.2)
BUN SERPL-MCNC: 35 MG/DL (ref 6–23)
CALCIUM SERPL-MCNC: 8.8 MG/DL (ref 8.6–10.2)
CHLORIDE SERPL-SCNC: 105 MMOL/L (ref 98–107)
CO2 SERPL-SCNC: 31 MMOL/L (ref 22–29)
CREAT SERPL-MCNC: 1.4 MG/DL (ref 0.7–1.2)
EOSINOPHIL # BLD: 0.15 K/UL (ref 0.05–0.5)
EOSINOPHILS RELATIVE PERCENT: 3 % (ref 0–6)
ERYTHROCYTE [DISTWIDTH] IN BLOOD BY AUTOMATED COUNT: 18.6 % (ref 11.5–15)
GFR SERPL CREATININE-BSD FRML MDRD: 55 ML/MIN/1.73M2
GLUCOSE SERPL-MCNC: 156 MG/DL (ref 74–99)
HCT VFR BLD AUTO: 55.8 % (ref 37–54)
HGB BLD-MCNC: 15.9 G/DL (ref 12.5–16.5)
LYMPHOCYTES NFR BLD: 0.84 K/UL (ref 1.5–4)
LYMPHOCYTES RELATIVE PERCENT: 15 % (ref 20–42)
MCH RBC QN AUTO: 23 PG (ref 26–35)
MCHC RBC AUTO-ENTMCNC: 28.5 G/DL (ref 32–34.5)
MCV RBC AUTO: 80.9 FL (ref 80–99.9)
MONOCYTES NFR BLD: 0.4 K/UL (ref 0.1–0.95)
MONOCYTES NFR BLD: 7 % (ref 2–12)
NEUTROPHILS NFR BLD: 76 % (ref 43–80)
NEUTS SEG NFR BLD: 4.31 K/UL (ref 1.8–7.3)
PLATELET # BLD AUTO: 354 K/UL (ref 130–450)
PMV BLD AUTO: 10.1 FL (ref 7–12)
POTASSIUM SERPL-SCNC: 5 MMOL/L (ref 3.5–5)
PROT SERPL-MCNC: 5.6 G/DL (ref 6.4–8.3)
RBC # BLD AUTO: 6.9 M/UL (ref 3.8–5.8)
RBC # BLD: ABNORMAL 10*6/UL
RBC #/AREA URNS HPF: ABNORMAL /HPF
SODIUM SERPL-SCNC: 143 MMOL/L (ref 132–146)
WBC #/AREA URNS HPF: ABNORMAL /HPF
WBC OTHER # BLD: 5.7 K/UL (ref 4.5–11.5)

## 2023-09-08 DIAGNOSIS — N30.00 ACUTE CYSTITIS WITHOUT HEMATURIA: Primary | ICD-10-CM

## 2023-09-08 LAB
MICROORGANISM SPEC CULT: ABNORMAL
SPECIMEN DESCRIPTION: ABNORMAL

## 2023-09-08 RX ORDER — SULFAMETHOXAZOLE AND TRIMETHOPRIM 800; 160 MG/1; MG/1
1 TABLET ORAL 2 TIMES DAILY
Qty: 20 TABLET | Refills: 0 | Status: SHIPPED | OUTPATIENT
Start: 2023-09-08 | End: 2023-09-18

## 2023-10-02 DIAGNOSIS — Z86.711 HISTORY OF PULMONARY EMBOLUS (PE): ICD-10-CM

## 2023-10-02 RX ORDER — WARFARIN SODIUM 2.5 MG/1
5 TABLET ORAL DAILY
Qty: 90 TABLET | Refills: 5 | Status: SHIPPED | OUTPATIENT
Start: 2023-10-02

## 2024-01-15 DIAGNOSIS — I10 ESSENTIAL HYPERTENSION: ICD-10-CM

## 2024-04-12 ENCOUNTER — HOSPITAL ENCOUNTER (OUTPATIENT)
Dept: WOUND CARE | Age: 69
Discharge: HOME | End: 2024-04-12
Attending: PODIATRIST

## 2024-04-12 NOTE — DISCHARGE INSTRUCTIONS
Visit Discharge/Physician Orders    Discharge condition: {STABLE/UNSTABLE:963572025}    Assessment of pain at discharge:    Anesthetic used:     Discharge to: {[facility] Wound Discharge To:20937}    Left via:{Left Via:20938}    Accompanied by: accompanied by {:[id number]}    ECF/HHA:     Dressing Orders:    Treatment Orders:    [facility] followup visit _____________________________  (Please note your next appointment above and if you are unable to keep, kindly give a 24 hour notice. Thank you.)    Physician signature:__________________________      If you experience any of the following, please call the Wound Care Center during business hours:    * Increase in Pain  * Temperature over 101  * Increase in drainage from your wound  * Drainage with a foul odor  * Bleeding  * Increase in swelling  * Need for compression bandage changes due to slippage, breakthrough drainage.    If you need medical attention outside of the business hours of the Wound Care Centers please contact your PCP or go to the nearest emergency room.

## 2024-05-25 LAB
ALBUMIN SERPL-MCNC: 2.8 G/DL (ref 3.5–5.2)
ALP SERPL-CCNC: 81 U/L (ref 40–129)
ALT SERPL-CCNC: 5 U/L (ref 0–40)
ANION GAP SERPL CALCULATED.3IONS-SCNC: 8 MMOL/L (ref 7–16)
AST SERPL-CCNC: 6 U/L (ref 0–39)
BACTERIA: ABNORMAL
BASOPHILS # BLD: 0.05 K/UL (ref 0–0.2)
BASOPHILS NFR BLD: 1 % (ref 0–2)
BILIRUB SERPL-MCNC: 0.3 MG/DL (ref 0–1.2)
BILIRUBIN, URINE: NEGATIVE
BUN SERPL-MCNC: 35 MG/DL (ref 6–23)
CALCIUM SERPL-MCNC: 7.9 MG/DL (ref 8.6–10.2)
CHLORIDE SERPL-SCNC: 101 MMOL/L (ref 98–107)
CO2 SERPL-SCNC: 30 MMOL/L (ref 22–29)
COLOR: YELLOW
CREAT SERPL-MCNC: 2 MG/DL (ref 0.7–1.2)
EOSINOPHIL # BLD: 0.18 K/UL (ref 0.05–0.5)
EOSINOPHILS RELATIVE PERCENT: 3 % (ref 0–6)
ERYTHROCYTE [DISTWIDTH] IN BLOOD BY AUTOMATED COUNT: 16.1 % (ref 11.5–15)
GFR, ESTIMATED: 36 ML/MIN/1.73M2
GLUCOSE SERPL-MCNC: 366 MG/DL (ref 74–99)
GLUCOSE URINE: 250 MG/DL
HCT VFR BLD AUTO: 46.7 % (ref 37–54)
HGB BLD-MCNC: 13.4 G/DL (ref 12.5–16.5)
IMM GRANULOCYTES # BLD AUTO: <0.03 K/UL (ref 0–0.58)
IMM GRANULOCYTES NFR BLD: 0 % (ref 0–5)
KETONES, URINE: NEGATIVE MG/DL
LEUKOCYTE ESTERASE, URINE: ABNORMAL
LYMPHOCYTES NFR BLD: 0.86 K/UL (ref 1.5–4)
LYMPHOCYTES RELATIVE PERCENT: 12 % (ref 20–42)
MCH RBC QN AUTO: 23.8 PG (ref 26–35)
MCHC RBC AUTO-ENTMCNC: 28.7 G/DL (ref 32–34.5)
MCV RBC AUTO: 83.1 FL (ref 80–99.9)
MONOCYTES NFR BLD: 0.51 K/UL (ref 0.1–0.95)
MONOCYTES NFR BLD: 7 % (ref 2–12)
NEUTROPHILS NFR BLD: 78 % (ref 43–80)
NEUTS SEG NFR BLD: 5.61 K/UL (ref 1.8–7.3)
NITRITE, URINE: NEGATIVE
PH, URINE: 5.5 (ref 5–9)
PLATELET # BLD AUTO: 378 K/UL (ref 130–450)
PMV BLD AUTO: 10.2 FL (ref 7–12)
POTASSIUM SERPL-SCNC: 4.7 MMOL/L (ref 3.5–5)
PROT SERPL-MCNC: 4.8 G/DL (ref 6.4–8.3)
PROTEIN UA: >=300 MG/DL
RBC # BLD AUTO: 5.62 M/UL (ref 3.8–5.8)
RBC # BLD: ABNORMAL 10*6/UL
RBC UA: ABNORMAL /HPF
SODIUM SERPL-SCNC: 139 MMOL/L (ref 132–146)
SPECIFIC GRAVITY UA: 1.02 (ref 1–1.03)
TURBIDITY: CLEAR
URINE HGB: NEGATIVE
UROBILINOGEN, URINE: 0.2 EU/DL (ref 0–1)
WBC OTHER # BLD: 7.2 K/UL (ref 4.5–11.5)
WBC UA: ABNORMAL /HPF

## 2024-05-26 LAB
CULTURE: ABNORMAL
CULTURE: ABNORMAL
SPECIMEN DESCRIPTION: ABNORMAL

## 2024-05-27 LAB
CULTURE: ABNORMAL
CULTURE: ABNORMAL
SPECIMEN DESCRIPTION: ABNORMAL

## 2024-06-29 LAB
25(OH)D3 SERPL-MCNC: 18.2 NG/ML (ref 30–100)
ALBUMIN SERPL-MCNC: 3.1 G/DL (ref 3.5–5.2)
ALP SERPL-CCNC: 67 U/L (ref 40–129)
ALT SERPL-CCNC: 6 U/L (ref 0–40)
ANION GAP SERPL CALCULATED.3IONS-SCNC: 8 MMOL/L (ref 7–16)
AST SERPL-CCNC: 8 U/L (ref 0–39)
BILIRUB SERPL-MCNC: 0.8 MG/DL (ref 0–1.2)
BUN SERPL-MCNC: 32 MG/DL (ref 6–23)
CALCIUM SERPL-MCNC: 8.3 MG/DL (ref 8.6–10.2)
CHLORIDE SERPL-SCNC: 102 MMOL/L (ref 98–107)
CO2 SERPL-SCNC: 28 MMOL/L (ref 22–29)
CREAT SERPL-MCNC: 1.6 MG/DL (ref 0.7–1.2)
CREAT UR-MCNC: 59 MG/DL (ref 40–278)
GFR, ESTIMATED: 45 ML/MIN/1.73M2
GLUCOSE SERPL-MCNC: 349 MG/DL (ref 74–99)
POTASSIUM SERPL-SCNC: 4.5 MMOL/L (ref 3.5–5)
PROT SERPL-MCNC: 5 G/DL (ref 6.4–8.3)
PTH-INTACT SERPL-MCNC: 132.3 PG/ML (ref 15–65)
SODIUM SERPL-SCNC: 138 MMOL/L (ref 132–146)
TOTAL PROTEIN, URINE: 573 MG/DL (ref 0–12)
URINE TOTAL PROTEIN CREATININE RATIO: 9.71 (ref 0–0.2)

## 2024-10-16 ENCOUNTER — APPOINTMENT (OUTPATIENT)
Dept: GENERAL RADIOLOGY | Age: 69
End: 2024-10-16
Payer: MEDICARE

## 2024-10-16 ENCOUNTER — ANESTHESIA EVENT (OUTPATIENT)
Dept: OPERATING ROOM | Age: 69
End: 2024-10-16
Payer: MEDICARE

## 2024-10-16 ENCOUNTER — ANESTHESIA (OUTPATIENT)
Dept: OPERATING ROOM | Age: 69
End: 2024-10-16
Payer: MEDICARE

## 2024-10-16 ENCOUNTER — HOSPITAL ENCOUNTER (INPATIENT)
Age: 69
LOS: 1 days | Discharge: ANOTHER HOSPITAL | End: 2024-10-17
Attending: EMERGENCY MEDICINE | Admitting: STUDENT IN AN ORGANIZED HEALTH CARE EDUCATION/TRAINING PROGRAM
Payer: MEDICARE

## 2024-10-16 DIAGNOSIS — S82.892C ANKLE FRACTURE, LEFT, OPEN TYPE III, INITIAL ENCOUNTER: Primary | ICD-10-CM

## 2024-10-16 LAB
ABO + RH BLD: NORMAL
ALBUMIN SERPL-MCNC: 3.3 G/DL (ref 3.5–5.2)
ALP SERPL-CCNC: 93 U/L (ref 40–129)
ALT SERPL-CCNC: 8 U/L (ref 0–40)
ANION GAP SERPL CALCULATED.3IONS-SCNC: 6 MMOL/L (ref 7–16)
ARM BAND NUMBER: NORMAL
AST SERPL-CCNC: 12 U/L (ref 0–39)
BASOPHILS # BLD: 0.04 K/UL (ref 0–0.2)
BASOPHILS NFR BLD: 0 % (ref 0–2)
BILIRUB SERPL-MCNC: 0.7 MG/DL (ref 0–1.2)
BLOOD BANK SAMPLE EXPIRATION: NORMAL
BLOOD GROUP ANTIBODIES SERPL: NEGATIVE
BUN BLD-MCNC: 34 MG/DL (ref 6–23)
BUN SERPL-MCNC: 38 MG/DL (ref 6–23)
CA-I BLD-SCNC: 1.1 MMOL/L (ref 1.15–1.33)
CALCIUM SERPL-MCNC: 9.3 MG/DL (ref 8.6–10.2)
CHLORIDE BLD-SCNC: 110 MMOL/L (ref 100–108)
CHLORIDE SERPL-SCNC: 103 MMOL/L (ref 98–107)
CO2 BLD CALC-SCNC: 24 MMOL/L (ref 22–29)
CO2 SERPL-SCNC: 34 MMOL/L (ref 22–29)
CREAT BLD-MCNC: 1.9 MG/DL (ref 0.7–1.2)
CREAT SERPL-MCNC: 1.9 MG/DL (ref 0.7–1.2)
CRP SERPL HS-MCNC: 15 MG/L (ref 0–5)
EGFR, POC: 38 ML/MIN/1.73M2
EKG ATRIAL RATE: 98 BPM
EKG P AXIS: 52 DEGREES
EKG P-R INTERVAL: 158 MS
EKG Q-T INTERVAL: 352 MS
EKG QRS DURATION: 72 MS
EKG QTC CALCULATION (BAZETT): 449 MS
EKG R AXIS: 47 DEGREES
EKG T AXIS: 64 DEGREES
EKG VENTRICULAR RATE: 98 BPM
EOSINOPHIL # BLD: 0.11 K/UL (ref 0.05–0.5)
EOSINOPHILS RELATIVE PERCENT: 1 % (ref 0–6)
ERYTHROCYTE [DISTWIDTH] IN BLOOD BY AUTOMATED COUNT: 18 % (ref 11.5–15)
GFR, ESTIMATED: 37 ML/MIN/1.73M2
GLUCOSE BLD-MCNC: 242 MG/DL (ref 74–99)
GLUCOSE SERPL-MCNC: 255 MG/DL (ref 74–99)
HCT VFR BLD AUTO: 34 % (ref 37–54)
HCT VFR BLD AUTO: 52.7 % (ref 37–54)
HGB BLD-MCNC: 15.4 G/DL (ref 12.5–16.5)
IMM GRANULOCYTES # BLD AUTO: 0.05 K/UL (ref 0–0.58)
IMM GRANULOCYTES NFR BLD: 0 % (ref 0–5)
INR PPP: 1.9
LYMPHOCYTES NFR BLD: 0.64 K/UL (ref 1.5–4)
LYMPHOCYTES RELATIVE PERCENT: 5 % (ref 20–42)
MCH RBC QN AUTO: 22.4 PG (ref 26–35)
MCHC RBC AUTO-ENTMCNC: 29.2 G/DL (ref 32–34.5)
MCV RBC AUTO: 76.6 FL (ref 80–99.9)
MONOCYTES NFR BLD: 0.59 K/UL (ref 0.1–0.95)
MONOCYTES NFR BLD: 5 % (ref 2–12)
NEGATIVE BASE EXCESS, ART: 3.2 MMOL/L
NEUTROPHILS NFR BLD: 89 % (ref 43–80)
NEUTS SEG NFR BLD: 11.13 K/UL (ref 1.8–7.3)
PARTIAL THROMBOPLASTIN TIME: 39.4 SEC (ref 24.5–35.1)
PLATELET # BLD AUTO: 572 K/UL (ref 130–450)
PMV BLD AUTO: 10.2 FL (ref 7–12)
POC ANION GAP: 9 MMOL/L (ref 7–16)
POC HCO3: 23.3 MMOL/L (ref 22–26)
POC HEMOGLOBIN (CALC): 11.4 G/DL (ref 12.5–15.5)
POC LACTIC ACID: <0.3 MMOL/L (ref 0.5–2.2)
POC O2 SATURATION: 100 % (ref 92–98.5)
POC PCO2: 47.2 MM HG (ref 35–45)
POC PH: 7.3 (ref 7.35–7.45)
POC PO2: 474 MM HG (ref 60–80)
POTASSIUM BLD-SCNC: 5 MMOL/L (ref 3.5–5)
POTASSIUM SERPL-SCNC: 5.6 MMOL/L (ref 3.5–5)
PROT SERPL-MCNC: 6.4 G/DL (ref 6.4–8.3)
PROTHROMBIN TIME: 21.3 SEC (ref 9.3–12.4)
RBC # BLD AUTO: 6.88 M/UL (ref 3.8–5.8)
SODIUM BLD-SCNC: 143 MMOL/L (ref 132–146)
SODIUM SERPL-SCNC: 143 MMOL/L (ref 132–146)
WBC OTHER # BLD: 12.6 K/UL (ref 4.5–11.5)

## 2024-10-16 PROCEDURE — 0QHM35Z INSERTION OF EXTERNAL FIXATION DEVICE INTO LEFT TARSAL, PERCUTANEOUS APPROACH: ICD-10-PCS | Performed by: ORTHOPAEDIC SURGERY

## 2024-10-16 PROCEDURE — 86850 RBC ANTIBODY SCREEN: CPT

## 2024-10-16 PROCEDURE — 86900 BLOOD TYPING SEROLOGIC ABO: CPT

## 2024-10-16 PROCEDURE — 6360000002 HC RX W HCPCS

## 2024-10-16 PROCEDURE — 85730 THROMBOPLASTIN TIME PARTIAL: CPT

## 2024-10-16 PROCEDURE — 6360000002 HC RX W HCPCS: Performed by: STUDENT IN AN ORGANIZED HEALTH CARE EDUCATION/TRAINING PROGRAM

## 2024-10-16 PROCEDURE — 2500000003 HC RX 250 WO HCPCS

## 2024-10-16 PROCEDURE — 3700000001 HC ADD 15 MINUTES (ANESTHESIA): Performed by: ORTHOPAEDIC SURGERY

## 2024-10-16 PROCEDURE — 6370000000 HC RX 637 (ALT 250 FOR IP): Performed by: PHYSICAL THERAPY ASSISTANT

## 2024-10-16 PROCEDURE — 2720000010 HC SURG SUPPLY STERILE: Performed by: ORTHOPAEDIC SURGERY

## 2024-10-16 PROCEDURE — 85025 COMPLETE CBC W/AUTO DIFF WBC: CPT

## 2024-10-16 PROCEDURE — 99222 1ST HOSP IP/OBS MODERATE 55: CPT | Performed by: STUDENT IN AN ORGANIZED HEALTH CARE EDUCATION/TRAINING PROGRAM

## 2024-10-16 PROCEDURE — 0QBP0ZZ EXCISION OF LEFT METATARSAL, OPEN APPROACH: ICD-10-PCS | Performed by: ORTHOPAEDIC SURGERY

## 2024-10-16 PROCEDURE — 2709999900 HC NON-CHARGEABLE SUPPLY: Performed by: ORTHOPAEDIC SURGERY

## 2024-10-16 PROCEDURE — 73610 X-RAY EXAM OF ANKLE: CPT

## 2024-10-16 PROCEDURE — 85014 HEMATOCRIT: CPT

## 2024-10-16 PROCEDURE — 96374 THER/PROPH/DIAG INJ IV PUSH: CPT

## 2024-10-16 PROCEDURE — 6370000000 HC RX 637 (ALT 250 FOR IP): Performed by: STUDENT IN AN ORGANIZED HEALTH CARE EDUCATION/TRAINING PROGRAM

## 2024-10-16 PROCEDURE — 36415 COLL VENOUS BLD VENIPUNCTURE: CPT

## 2024-10-16 PROCEDURE — 82803 BLOOD GASES ANY COMBINATION: CPT

## 2024-10-16 PROCEDURE — 80053 COMPREHEN METABOLIC PANEL: CPT

## 2024-10-16 PROCEDURE — 2580000003 HC RX 258: Performed by: EMERGENCY MEDICINE

## 2024-10-16 PROCEDURE — 3700000000 HC ANESTHESIA ATTENDED CARE: Performed by: ORTHOPAEDIC SURGERY

## 2024-10-16 PROCEDURE — 86140 C-REACTIVE PROTEIN: CPT

## 2024-10-16 PROCEDURE — 83605 ASSAY OF LACTIC ACID: CPT

## 2024-10-16 PROCEDURE — 2580000003 HC RX 258: Performed by: PHYSICAL THERAPY ASSISTANT

## 2024-10-16 PROCEDURE — 3600000016 HC SURGERY LEVEL 6 ADDTL 15MIN: Performed by: ORTHOPAEDIC SURGERY

## 2024-10-16 PROCEDURE — 99285 EMERGENCY DEPT VISIT HI MDM: CPT

## 2024-10-16 PROCEDURE — 27766 OPTX MEDIAL ANKLE FX: CPT | Performed by: ORTHOPAEDIC SURGERY

## 2024-10-16 PROCEDURE — 13122 CMPLX RPR S/A/L ADDL 5 CM/>: CPT | Performed by: ORTHOPAEDIC SURGERY

## 2024-10-16 PROCEDURE — 93010 ELECTROCARDIOGRAM REPORT: CPT | Performed by: INTERNAL MEDICINE

## 2024-10-16 PROCEDURE — P9041 ALBUMIN (HUMAN),5%, 50ML: HCPCS

## 2024-10-16 PROCEDURE — C1713 ANCHOR/SCREW BN/BN,TIS/BN: HCPCS | Performed by: ORTHOPAEDIC SURGERY

## 2024-10-16 PROCEDURE — 71045 X-RAY EXAM CHEST 1 VIEW: CPT

## 2024-10-16 PROCEDURE — 93005 ELECTROCARDIOGRAM TRACING: CPT | Performed by: EMERGENCY MEDICINE

## 2024-10-16 PROCEDURE — C1769 GUIDE WIRE: HCPCS | Performed by: ORTHOPAEDIC SURGERY

## 2024-10-16 PROCEDURE — 7100000001 HC PACU RECOVERY - ADDTL 15 MIN: Performed by: ORTHOPAEDIC SURGERY

## 2024-10-16 PROCEDURE — 13121 CMPLX RPR S/A/L 2.6-7.5 CM: CPT | Performed by: ORTHOPAEDIC SURGERY

## 2024-10-16 PROCEDURE — 11012 DEB SKIN BONE AT FX SITE: CPT | Performed by: ORTHOPAEDIC SURGERY

## 2024-10-16 PROCEDURE — 85610 PROTHROMBIN TIME: CPT

## 2024-10-16 PROCEDURE — 2060000000 HC ICU INTERMEDIATE R&B

## 2024-10-16 PROCEDURE — 6360000002 HC RX W HCPCS: Performed by: PHYSICAL THERAPY ASSISTANT

## 2024-10-16 PROCEDURE — 3600000006 HC SURGERY LEVEL 6 BASE: Performed by: ORTHOPAEDIC SURGERY

## 2024-10-16 PROCEDURE — 73600 X-RAY EXAM OF ANKLE: CPT

## 2024-10-16 PROCEDURE — 80047 BASIC METABLC PNL IONIZED CA: CPT

## 2024-10-16 PROCEDURE — 2580000003 HC RX 258

## 2024-10-16 PROCEDURE — 73630 X-RAY EXAM OF FOOT: CPT

## 2024-10-16 PROCEDURE — 28636 TREAT TOE DISLOCATION: CPT | Performed by: ORTHOPAEDIC SURGERY

## 2024-10-16 PROCEDURE — 0QSH04Z REPOSITION LEFT TIBIA WITH INTERNAL FIXATION DEVICE, OPEN APPROACH: ICD-10-PCS | Performed by: ORTHOPAEDIC SURGERY

## 2024-10-16 PROCEDURE — 86901 BLOOD TYPING SEROLOGIC RH(D): CPT

## 2024-10-16 PROCEDURE — 6370000000 HC RX 637 (ALT 250 FOR IP): Performed by: ANESTHESIOLOGY

## 2024-10-16 PROCEDURE — 99223 1ST HOSP IP/OBS HIGH 75: CPT | Performed by: ORTHOPAEDIC SURGERY

## 2024-10-16 PROCEDURE — 5A09357 ASSISTANCE WITH RESPIRATORY VENTILATION, LESS THAN 24 CONSECUTIVE HOURS, CONTINUOUS POSITIVE AIRWAY PRESSURE: ICD-10-PCS

## 2024-10-16 PROCEDURE — 20692 APPL MLTPLN UNI EXT FIXJ SYS: CPT | Performed by: ORTHOPAEDIC SURGERY

## 2024-10-16 PROCEDURE — 7100000000 HC PACU RECOVERY - FIRST 15 MIN: Performed by: ORTHOPAEDIC SURGERY

## 2024-10-16 PROCEDURE — 2580000003 HC RX 258: Performed by: STUDENT IN AN ORGANIZED HEALTH CARE EDUCATION/TRAINING PROGRAM

## 2024-10-16 DEVICE — SCREW BNE L44MM DIA4MM S STL CANN SHT 1/3 THRD SM HEX SOCK: Type: IMPLANTABLE DEVICE | Site: ANKLE | Status: FUNCTIONAL

## 2024-10-16 RX ORDER — LIDOCAINE HYDROCHLORIDE 20 MG/ML
INJECTION, SOLUTION INTRAVENOUS
Status: DISCONTINUED | OUTPATIENT
Start: 2024-10-16 | End: 2024-10-16 | Stop reason: SDUPTHER

## 2024-10-16 RX ORDER — ALBUMIN, HUMAN INJ 5% 5 %
SOLUTION INTRAVENOUS
Status: DISCONTINUED | OUTPATIENT
Start: 2024-10-16 | End: 2024-10-16 | Stop reason: SDUPTHER

## 2024-10-16 RX ORDER — PHENYLEPHRINE HCL IN 0.9% NACL 1 MG/10 ML
SYRINGE (ML) INTRAVENOUS
Status: DISCONTINUED | OUTPATIENT
Start: 2024-10-16 | End: 2024-10-16 | Stop reason: SDUPTHER

## 2024-10-16 RX ORDER — SODIUM CHLORIDE, SODIUM LACTATE, POTASSIUM CHLORIDE, CALCIUM CHLORIDE 600; 310; 30; 20 MG/100ML; MG/100ML; MG/100ML; MG/100ML
INJECTION, SOLUTION INTRAVENOUS
Status: DISCONTINUED | OUTPATIENT
Start: 2024-10-16 | End: 2024-10-16 | Stop reason: SDUPTHER

## 2024-10-16 RX ORDER — ROCURONIUM BROMIDE 10 MG/ML
INJECTION, SOLUTION INTRAVENOUS
Status: DISCONTINUED | OUTPATIENT
Start: 2024-10-16 | End: 2024-10-16 | Stop reason: SDUPTHER

## 2024-10-16 RX ORDER — ACETAMINOPHEN AND CODEINE PHOSPHATE 300; 15 MG/1; MG/1
1 TABLET ORAL EVERY 4 HOURS PRN
Qty: 28 TABLET | Refills: 0 | Status: SHIPPED | OUTPATIENT
Start: 2024-10-16 | End: 2024-10-19

## 2024-10-16 RX ORDER — METOPROLOL TARTRATE 25 MG/1
25 TABLET, FILM COATED ORAL 2 TIMES DAILY
Status: DISCONTINUED | OUTPATIENT
Start: 2024-10-16 | End: 2024-10-17 | Stop reason: HOSPADM

## 2024-10-16 RX ORDER — MORPHINE SULFATE 2 MG/ML
2 INJECTION, SOLUTION INTRAMUSCULAR; INTRAVENOUS EVERY 4 HOURS PRN
Status: DISCONTINUED | OUTPATIENT
Start: 2024-10-16 | End: 2024-10-17 | Stop reason: HOSPADM

## 2024-10-16 RX ORDER — SODIUM CHLORIDE 9 MG/ML
INJECTION, SOLUTION INTRAVENOUS CONTINUOUS
Status: DISCONTINUED | OUTPATIENT
Start: 2024-10-16 | End: 2024-10-17 | Stop reason: HOSPADM

## 2024-10-16 RX ORDER — ACETAMINOPHEN 325 MG/1
650 TABLET ORAL EVERY 6 HOURS PRN
Status: DISCONTINUED | OUTPATIENT
Start: 2024-10-16 | End: 2024-10-17 | Stop reason: HOSPADM

## 2024-10-16 RX ORDER — FENTANYL CITRATE 50 UG/ML
50 INJECTION, SOLUTION INTRAMUSCULAR; INTRAVENOUS EVERY 5 MIN PRN
Status: DISCONTINUED | OUTPATIENT
Start: 2024-10-16 | End: 2024-10-16 | Stop reason: HOSPADM

## 2024-10-16 RX ORDER — SODIUM CHLORIDE 9 MG/ML
INJECTION, SOLUTION INTRAVENOUS PRN
Status: DISCONTINUED | OUTPATIENT
Start: 2024-10-16 | End: 2024-10-17 | Stop reason: HOSPADM

## 2024-10-16 RX ORDER — SODIUM CHLORIDE 9 MG/ML
INJECTION, SOLUTION INTRAVENOUS
Status: DISCONTINUED | OUTPATIENT
Start: 2024-10-16 | End: 2024-10-16 | Stop reason: SDUPTHER

## 2024-10-16 RX ORDER — SODIUM CHLORIDE 9 MG/ML
INJECTION, SOLUTION INTRAVENOUS
Status: DISCONTINUED | OUTPATIENT
Start: 2024-10-16 | End: 2024-10-16

## 2024-10-16 RX ORDER — SODIUM CHLORIDE 0.9 % (FLUSH) 0.9 %
5-40 SYRINGE (ML) INJECTION PRN
Status: DISCONTINUED | OUTPATIENT
Start: 2024-10-16 | End: 2024-10-16 | Stop reason: HOSPADM

## 2024-10-16 RX ORDER — CALCIUM CHLORIDE 100 MG/ML
INJECTION INTRAVENOUS; INTRAVENTRICULAR
Status: DISCONTINUED | OUTPATIENT
Start: 2024-10-16 | End: 2024-10-16 | Stop reason: SDUPTHER

## 2024-10-16 RX ORDER — ASPIRIN 81 MG/1
81 TABLET ORAL 2 TIMES DAILY
Status: DISCONTINUED | OUTPATIENT
Start: 2024-10-16 | End: 2024-10-17 | Stop reason: HOSPADM

## 2024-10-16 RX ORDER — ACETAMINOPHEN 650 MG/1
650 SUPPOSITORY RECTAL EVERY 6 HOURS PRN
Status: DISCONTINUED | OUTPATIENT
Start: 2024-10-16 | End: 2024-10-17 | Stop reason: HOSPADM

## 2024-10-16 RX ORDER — GLUCAGON 1 MG/ML
1 KIT INJECTION PRN
Status: DISCONTINUED | OUTPATIENT
Start: 2024-10-16 | End: 2024-10-17 | Stop reason: HOSPADM

## 2024-10-16 RX ORDER — ONDANSETRON 2 MG/ML
4 INJECTION INTRAMUSCULAR; INTRAVENOUS EVERY 6 HOURS PRN
Status: DISCONTINUED | OUTPATIENT
Start: 2024-10-16 | End: 2024-10-17 | Stop reason: HOSPADM

## 2024-10-16 RX ORDER — NALOXONE HYDROCHLORIDE 0.4 MG/ML
INJECTION, SOLUTION INTRAMUSCULAR; INTRAVENOUS; SUBCUTANEOUS PRN
Status: DISCONTINUED | OUTPATIENT
Start: 2024-10-16 | End: 2024-10-16 | Stop reason: HOSPADM

## 2024-10-16 RX ORDER — SODIUM CHLORIDE 0.9 % (FLUSH) 0.9 %
5-40 SYRINGE (ML) INJECTION EVERY 12 HOURS SCHEDULED
Status: DISCONTINUED | OUTPATIENT
Start: 2024-10-16 | End: 2024-10-16 | Stop reason: HOSPADM

## 2024-10-16 RX ORDER — DEXTROSE MONOHYDRATE 100 MG/ML
INJECTION, SOLUTION INTRAVENOUS CONTINUOUS PRN
Status: DISCONTINUED | OUTPATIENT
Start: 2024-10-16 | End: 2024-10-17 | Stop reason: HOSPADM

## 2024-10-16 RX ORDER — PROPOFOL 10 MG/ML
INJECTION, EMULSION INTRAVENOUS
Status: DISCONTINUED | OUTPATIENT
Start: 2024-10-16 | End: 2024-10-16 | Stop reason: SDUPTHER

## 2024-10-16 RX ORDER — PROCHLORPERAZINE EDISYLATE 5 MG/ML
5 INJECTION INTRAMUSCULAR; INTRAVENOUS
Status: DISCONTINUED | OUTPATIENT
Start: 2024-10-16 | End: 2024-10-16 | Stop reason: HOSPADM

## 2024-10-16 RX ORDER — VASOPRESSIN 20 U/ML
INJECTION PARENTERAL
Status: DISCONTINUED | OUTPATIENT
Start: 2024-10-16 | End: 2024-10-16 | Stop reason: SDUPTHER

## 2024-10-16 RX ORDER — 0.9 % SODIUM CHLORIDE 0.9 %
1000 INTRAVENOUS SOLUTION INTRAVENOUS ONCE
Status: COMPLETED | OUTPATIENT
Start: 2024-10-16 | End: 2024-10-16

## 2024-10-16 RX ORDER — ONDANSETRON 2 MG/ML
INJECTION INTRAMUSCULAR; INTRAVENOUS
Status: DISCONTINUED | OUTPATIENT
Start: 2024-10-16 | End: 2024-10-16 | Stop reason: SDUPTHER

## 2024-10-16 RX ORDER — SODIUM CHLORIDE 0.9 % (FLUSH) 0.9 %
5-40 SYRINGE (ML) INJECTION EVERY 12 HOURS SCHEDULED
Status: DISCONTINUED | OUTPATIENT
Start: 2024-10-16 | End: 2024-10-17 | Stop reason: HOSPADM

## 2024-10-16 RX ORDER — SODIUM CHLORIDE 0.9 % (FLUSH) 0.9 %
5-40 SYRINGE (ML) INJECTION PRN
Status: DISCONTINUED | OUTPATIENT
Start: 2024-10-16 | End: 2024-10-17 | Stop reason: HOSPADM

## 2024-10-16 RX ORDER — ONDANSETRON 4 MG/1
4 TABLET, ORALLY DISINTEGRATING ORAL EVERY 8 HOURS PRN
Status: DISCONTINUED | OUTPATIENT
Start: 2024-10-16 | End: 2024-10-17 | Stop reason: HOSPADM

## 2024-10-16 RX ORDER — FENTANYL CITRATE 50 UG/ML
25 INJECTION, SOLUTION INTRAMUSCULAR; INTRAVENOUS EVERY 5 MIN PRN
Status: COMPLETED | OUTPATIENT
Start: 2024-10-16 | End: 2024-10-16

## 2024-10-16 RX ORDER — INSULIN LISPRO 100 [IU]/ML
0-8 INJECTION, SOLUTION INTRAVENOUS; SUBCUTANEOUS
Status: DISCONTINUED | OUTPATIENT
Start: 2024-10-16 | End: 2024-10-17 | Stop reason: HOSPADM

## 2024-10-16 RX ORDER — POLYETHYLENE GLYCOL 3350 17 G/17G
17 POWDER, FOR SOLUTION ORAL DAILY PRN
Status: DISCONTINUED | OUTPATIENT
Start: 2024-10-16 | End: 2024-10-17 | Stop reason: HOSPADM

## 2024-10-16 RX ORDER — OXYCODONE AND ACETAMINOPHEN 5; 325 MG/1; MG/1
1 TABLET ORAL EVERY 4 HOURS PRN
Status: DISCONTINUED | OUTPATIENT
Start: 2024-10-16 | End: 2024-10-17 | Stop reason: HOSPADM

## 2024-10-16 RX ORDER — FENTANYL CITRATE 50 UG/ML
INJECTION, SOLUTION INTRAMUSCULAR; INTRAVENOUS
Status: DISCONTINUED | OUTPATIENT
Start: 2024-10-16 | End: 2024-10-16 | Stop reason: SDUPTHER

## 2024-10-16 RX ORDER — ROSUVASTATIN CALCIUM 10 MG/1
10 TABLET, COATED ORAL NIGHTLY
Status: DISCONTINUED | OUTPATIENT
Start: 2024-10-16 | End: 2024-10-17 | Stop reason: HOSPADM

## 2024-10-16 RX ORDER — SODIUM CHLORIDE 9 MG/ML
INJECTION, SOLUTION INTRAVENOUS PRN
Status: DISCONTINUED | OUTPATIENT
Start: 2024-10-16 | End: 2024-10-16 | Stop reason: HOSPADM

## 2024-10-16 RX ORDER — PANTOPRAZOLE SODIUM 20 MG/1
20 TABLET, DELAYED RELEASE ORAL DAILY
Status: DISCONTINUED | OUTPATIENT
Start: 2024-10-16 | End: 2024-10-17 | Stop reason: HOSPADM

## 2024-10-16 RX ORDER — DEXAMETHASONE SODIUM PHOSPHATE 10 MG/ML
INJECTION INTRAMUSCULAR; INTRAVENOUS
Status: DISCONTINUED | OUTPATIENT
Start: 2024-10-16 | End: 2024-10-16 | Stop reason: SDUPTHER

## 2024-10-16 RX ORDER — INSULIN GLARGINE 100 [IU]/ML
10 INJECTION, SOLUTION SUBCUTANEOUS NIGHTLY
Status: DISCONTINUED | OUTPATIENT
Start: 2024-10-16 | End: 2024-10-17 | Stop reason: HOSPADM

## 2024-10-16 RX ADMIN — METOPROLOL TARTRATE 25 MG: 25 TABLET, FILM COATED ORAL at 20:44

## 2024-10-16 RX ADMIN — SODIUM CHLORIDE: 9 INJECTION, SOLUTION INTRAVENOUS at 15:12

## 2024-10-16 RX ADMIN — Medication 100 MCG: at 12:05

## 2024-10-16 RX ADMIN — INSULIN HUMAN 6 UNITS: 100 INJECTION, SOLUTION PARENTERAL at 11:44

## 2024-10-16 RX ADMIN — Medication 70 MG: at 10:30

## 2024-10-16 RX ADMIN — SODIUM CHLORIDE: 0.9 INJECTION, SOLUTION INTRAVENOUS at 11:54

## 2024-10-16 RX ADMIN — SODIUM CHLORIDE, PRESERVATIVE FREE 10 ML: 5 INJECTION INTRAVENOUS at 22:41

## 2024-10-16 RX ADMIN — ASPIRIN 81 MG: 81 TABLET, COATED ORAL at 20:44

## 2024-10-16 RX ADMIN — VASOPRESSIN 1 UNITS: 20 INJECTION INTRAVENOUS at 13:16

## 2024-10-16 RX ADMIN — VASOPRESSIN 1 UNITS: 20 INJECTION INTRAVENOUS at 12:56

## 2024-10-16 RX ADMIN — DEXAMETHASONE SODIUM PHOSPHATE 10 MG: 10 INJECTION INTRAMUSCULAR; INTRAVENOUS at 12:41

## 2024-10-16 RX ADMIN — CEFAZOLIN 2000 MG: 2 INJECTION, POWDER, FOR SOLUTION INTRAMUSCULAR; INTRAVENOUS at 22:41

## 2024-10-16 RX ADMIN — PIPERACILLIN AND TAZOBACTAM 4500 MG: 4; .5 INJECTION, POWDER, FOR SOLUTION INTRAVENOUS at 10:48

## 2024-10-16 RX ADMIN — LIDOCAINE HYDROCHLORIDE 100 MG: 20 INJECTION, SOLUTION INTRAVENOUS at 12:05

## 2024-10-16 RX ADMIN — Medication 100 MCG: at 12:00

## 2024-10-16 RX ADMIN — ONDANSETRON 4 MG: 2 INJECTION INTRAMUSCULAR; INTRAVENOUS at 13:43

## 2024-10-16 RX ADMIN — VASOPRESSIN 1 UNITS: 20 INJECTION INTRAVENOUS at 13:06

## 2024-10-16 RX ADMIN — ROCURONIUM BROMIDE 100 MG: 10 INJECTION, SOLUTION INTRAVENOUS at 12:05

## 2024-10-16 RX ADMIN — SODIUM CHLORIDE: 9 INJECTION, SOLUTION INTRAVENOUS at 10:50

## 2024-10-16 RX ADMIN — CALCIUM CHLORIDE 0.1 G: 100 INJECTION, SOLUTION INTRAVENOUS at 12:44

## 2024-10-16 RX ADMIN — SUGAMMADEX 400 MG: 100 INJECTION, SOLUTION INTRAVENOUS at 13:44

## 2024-10-16 RX ADMIN — Medication 200 MCG: at 13:37

## 2024-10-16 RX ADMIN — FENTANYL CITRATE 100 MCG: 50 INJECTION, SOLUTION INTRAMUSCULAR; INTRAVENOUS at 12:05

## 2024-10-16 RX ADMIN — SODIUM CHLORIDE 1000 ML: 9 INJECTION, SOLUTION INTRAVENOUS at 10:07

## 2024-10-16 RX ADMIN — FENTANYL CITRATE 25 MCG: 50 INJECTION INTRAMUSCULAR; INTRAVENOUS at 15:45

## 2024-10-16 RX ADMIN — PANTOPRAZOLE SODIUM 20 MG: 20 TABLET, DELAYED RELEASE ORAL at 20:44

## 2024-10-16 RX ADMIN — VASOPRESSIN 1 UNITS: 20 INJECTION INTRAVENOUS at 12:51

## 2024-10-16 RX ADMIN — OXYCODONE HYDROCHLORIDE AND ACETAMINOPHEN 1 TABLET: 5; 325 TABLET ORAL at 18:46

## 2024-10-16 RX ADMIN — CALCIUM CHLORIDE 0.1 G: 100 INJECTION, SOLUTION INTRAVENOUS at 12:56

## 2024-10-16 RX ADMIN — VASOPRESSIN 2 UNITS: 20 INJECTION INTRAVENOUS at 12:10

## 2024-10-16 RX ADMIN — VASOPRESSIN 2 UNITS: 20 INJECTION INTRAVENOUS at 12:20

## 2024-10-16 RX ADMIN — CALCIUM CHLORIDE 0.1 G: 100 INJECTION, SOLUTION INTRAVENOUS at 12:34

## 2024-10-16 RX ADMIN — VASOPRESSIN 1 UNITS: 20 INJECTION INTRAVENOUS at 12:40

## 2024-10-16 RX ADMIN — PROPOFOL 250 MG: 10 INJECTION, EMULSION INTRAVENOUS at 12:05

## 2024-10-16 RX ADMIN — VASOPRESSIN 1 UNITS: 20 INJECTION INTRAVENOUS at 13:23

## 2024-10-16 RX ADMIN — CALCIUM CHLORIDE 0.1 G: 100 INJECTION, SOLUTION INTRAVENOUS at 12:51

## 2024-10-16 RX ADMIN — ALBUMIN (HUMAN) 25 G: 12.5 INJECTION, SOLUTION INTRAVENOUS at 12:06

## 2024-10-16 RX ADMIN — SODIUM CHLORIDE, POTASSIUM CHLORIDE, SODIUM LACTATE AND CALCIUM CHLORIDE: 600; 310; 30; 20 INJECTION, SOLUTION INTRAVENOUS at 12:30

## 2024-10-16 RX ADMIN — Medication 500 MCG: at 12:10

## 2024-10-16 RX ADMIN — SODIUM CHLORIDE: 0.9 INJECTION, SOLUTION INTRAVENOUS at 12:17

## 2024-10-16 RX ADMIN — ROSUVASTATIN 10 MG: 10 TABLET, FILM COATED ORAL at 20:44

## 2024-10-16 RX ADMIN — CALCIUM CHLORIDE 0.1 G: 100 INJECTION, SOLUTION INTRAVENOUS at 12:39

## 2024-10-16 RX ADMIN — FENTANYL CITRATE 25 MCG: 50 INJECTION INTRAMUSCULAR; INTRAVENOUS at 15:10

## 2024-10-16 ASSESSMENT — PAIN DESCRIPTION - FREQUENCY
FREQUENCY: INTERMITTENT
FREQUENCY: INTERMITTENT

## 2024-10-16 ASSESSMENT — PAIN DESCRIPTION - LOCATION
LOCATION: LEG

## 2024-10-16 ASSESSMENT — PAIN - FUNCTIONAL ASSESSMENT: PAIN_FUNCTIONAL_ASSESSMENT: NONE - DENIES PAIN

## 2024-10-16 ASSESSMENT — PAIN SCALES - GENERAL
PAINLEVEL_OUTOF10: 6
PAINLEVEL_OUTOF10: 0
PAINLEVEL_OUTOF10: 8
PAINLEVEL_OUTOF10: 5
PAINLEVEL_OUTOF10: 6

## 2024-10-16 ASSESSMENT — PAIN DESCRIPTION - PAIN TYPE
TYPE: SURGICAL PAIN
TYPE: SURGICAL PAIN

## 2024-10-16 ASSESSMENT — PAIN DESCRIPTION - DESCRIPTORS
DESCRIPTORS: ACHING;DISCOMFORT
DESCRIPTORS: ACHING;STABBING
DESCRIPTORS: ACHING;DISCOMFORT

## 2024-10-16 ASSESSMENT — PAIN DESCRIPTION - ORIENTATION
ORIENTATION: LEFT;LOWER
ORIENTATION: LOWER;LEFT

## 2024-10-16 ASSESSMENT — PAIN SCALES - WONG BAKER
WONGBAKER_NUMERICALRESPONSE: NO HURT

## 2024-10-16 NOTE — CONSULTS
Department of Orthopedic Surgery  Resident Consult Note          Reason for Consult: Left ankle open fracture    HISTORY OF PRESENT ILLNESS:       Patient is a 68 y.o. male who presents with a left ankle open fracture.  Patient states that he fell around 830 this morning.  He was unable to get himself up.  EMS arrived 10 minutes later and states that his foot was cold and pulseless upon arrival.  He was placed in a splint by EMS and had some return of perfusion once ankle was splinted.  Dopplerable DP pulse in ED prior to reduction, no PT pulse.  Denies numbness/tingling/paresthesias.  Patient has numerous medical comorbidities including coronary artery disease, diabetes, morbid obesity.  Denies fevers or chills. Denies any other orthopedic complaints at this time.  Was given Zosyn in ED soon upon arrival.    Past Medical History:        Diagnosis Date    Arthritis     Hips, possible shoulders and knees    CAD (coronary artery disease)     Cellulitis 11/2016    right leg    Diabetes mellitus (HCC)     Hyperlipidemia     Hypertension     Kidney failure     Morbid obesity with body mass index (BMI) of 60.0 to 69.9 in adult     Pulmonary embolism (HCC)     Sleep apnea      Past Surgical History:        Procedure Laterality Date    CARDIAC SURGERY  05/13/2019    ACB x 5 at [facility]    COLONOSCOPY  01/24/2006    ENDOSCOPY, COLON, DIAGNOSTIC      THROAT SURGERY      TONSILLECTOMY      WISDOM TOOTH EXTRACTION       Current Medications:   Current Facility-Administered Medications: piperacillin-tazobactam (ZOSYN) 4,500 mg in sodium chloride 0.9 % 100 mL IVPB ([id number]), 4,500 mg, IntraVENous, Once  sodium chloride 0.9 % bolus 1,000 mL, 1,000 mL, IntraVENous, Once  0.9 % sodium chloride infusion, , IntraVENous, Continuous  Allergies:  Dilaudid [hydromorphone hcl] and Ultram [tramadol]    Social History:   TOBACCO:   reports that he has been smoking cigars. He has never used smokeless tobacco.  ETOH:   reports current

## 2024-10-16 NOTE — ANESTHESIA PROCEDURE NOTES
Arterial Line:    An arterial line was placed using ultrasound guidance, in the OR for the following indication(s): continuous blood pressure monitoring and blood sampling needed.    A 20 gauge (size) (length), Arrow (type) catheter was placed, Seldinger technique used, into the right brachial artery, secured by tape and Tegaderm.    Events:  patient tolerated procedure well with no complications and EBL < 5mL.10/16/2024 12:29 PM10/16/2024 12:31 PM  Anesthesiologist: Karine Young MD  Performed: Anesthesiologist   Preanesthetic Checklist  Completed: patient identified, IV checked, site marked, risks and benefits discussed, surgical/procedural consents, equipment checked, pre-op evaluation, timeout performed, anesthesia consent given, oxygen available, monitors applied/VS acknowledged, fire risk safety assessment completed and verbalized and blood product R/B/A discussed and consented

## 2024-10-16 NOTE — H&P
Hospitalist History & Physical      PCP: Geoff Kauffman DO    Date of Admission: 10/16/2024    Date of Service: Pt seen/examined on 10/16/2024 and is admitted to Inpatient with expected LOS greater than two midnights due to medical therapy.      Chief Complaint: Fall    History Of Present Illness: 68-year-old male patient with history of CAD, diabetes, hyperlipidemia, hypertension, CKD, PAD, SUKHWINDER, morbidly obese who presents to the ER from home after a fall from standing at home this morning with subsequent left ankle pain with deformity.  Per report, upon EMS arrival his left foot was \"and pulseless.  EMS placed a splint with improvement of perfusion.  Left PT pulse not detected before reduction on ER.  Patient had no other symptoms, denying focal neurological deficit, fevers, chills, chest pain, shortness of breath.  His vital signs he was afebrile, /66  RR 19 SpO2 97% on room air.  Labs reveal mild leukocytosis 12.6 with left shift, hemoglobin 15.4, platelets 572, potassium 5.6, BUN 38, creatinine 1.9, CO2 34.  INR 1.9.  X-ray left ankle with fracture dislocation ankle. Orthopedic evaluated patient and performed reduction and took patient to the OR.  Patient received Zosyn on ER.  [facility] given patient multiple comorbidities.  Case was discussed with ER physician.    Patient was evaluated bedside after the OR.  Family was present.  Patient believes he hit his head when he fell.  Discussed that we do not head CT as he is on warfarin          Past Medical History:   Diagnosis Date    Arthritis     Hips, possible shoulders and knees    CAD (coronary artery disease)     Cellulitis 11/2016    right leg    Diabetes mellitus (HCC)     Hyperlipidemia     Hypertension     Kidney failure     Morbid obesity with body mass index (BMI) of 60.0 to 69.9 in adult     Pulmonary embolism (HCC)     Sleep apnea        Past Surgical History:   Procedure Laterality Date     Transient ischemic attack (TIA) TECHNIQUE:  CT head without contrast and CTA head and neck. 3-D post-processed images were created, reviewed and archived. MQ:  CTABNPlus_3 Contrast:  IV administration of 100 cc Omnipaque 350 Dose-Length Product (DLP): 1554 mGy*cm CT Dose Reduction Employed: Automated exposure control (AEC) COMPARISON: MRI brain 01/15/2024 RESULT: BRAIN: Acute change:   No evidence of an acute intracranial process. Hemorrhage:    No evidence of acute intracranial hemorrhage. Mass Lesion / Mass Effect:   No evidence of an intracranial mass, extra-axial fluid collection, or significant localized mass effect. Chronic change:   Scattered patchy foci of low attenuation are present within supratentorial white matter which is a nonspecific finding but likely represents mild microvascular ischemia. Parenchyma:  Mild generalized volume loss. Ventricles:   Ventricular enlargement concordant with the degree of parenchymal volume loss. Other:  Near complete opacification of the left sphenoid sinus with superimposed heterogeneous hyperdense likely chronically inspissated material, and mucosal thickening versus small air-fluid level in the right sphenoid sinus. The calvarium, skull base, mastoids, orbits and extracranial soft tissues are unremarkable.  Bilateral cataract surgery. NECK: Soft tissues:  Asymmetric heterogeneous enlargement of the left thyroid lobe with marginal substernal extension, in total measuring approximately 57 x 48 x 44 mm (CC, AP, transverse). Slightly rightward deviation of the traversing trachea. Spine: Slight reversal of the cervical lordosis. No significant spondylosis. Lungs:  The imaged lungs are clear. CT ARTERIOGRAM: EXTRACRANIAL CIRCULATION: Aortic arch and branch vessels: Common origin of the brachiocephalic and left common carotid arteries, normal anatomic variant.  No significant stenosis in the proximal brachiocephalic vessels.  Partial retropharyngeal course of each common and

## 2024-10-16 NOTE — DISCHARGE INSTRUCTIONS
[facility] Department of Orthopedic Surgery  [address] [facility] [address]    Dr. Jose Morrissey, DO         MD Dr. Jose Michael MD Frank Ansevin, PA-C Sara Zatchok, PA-C Tyler Tsangaris PA-C      Orthopaedics Discharge Instructions   Weight bearing Status - Non-weight bearing - on left lower Extremity  Pain Medication   Contact Office for Medication Refill- [phone]  Office can refill pain medications no sooner than every 7 days  If patient discharging to facility then pain control will be continued per facility physician  Ice to operative/injured site for 15-30 minutes of each hour for next 5 days    Recommend that you continue to ice the area 2-3 times per day after this   Elevate operative/injured limb on 2 pillows at home  Goal is to have limb above the heart if able  Continue DVT Prophylaxis (blood clot prevention) as prescribed   Wound care - keep dressing clean and dry    Follow up in office in approximately 1 week. Your first follow up appointment is often with one of our physician assistants.     Call the office at [phone] if having any concerns.     Watch for these significant complications.  Call physician if they or any other problems occur:  Fever over 101°, redness, swelling or warmth at the operative site  Unrelieved nausea    Foul smelling or cloudy drainage at the operative site   Unrelieved pain    Blood soaked dressing. (Some oozing may be normal)     Numb, pale, blue, cold or tingling extremity          It is the Department of Orthopaedic Trauma's standard of practice that providers will de-escalate (wean) all patients from narcotic (opioid) medications during the post-operative period.   We provide multimodal pain control, but opioid medications are tapered in all of our patients.  If patient requires referral to pain management for prolonged taper from opioid pain medication, we will facilitate this process.        Weight bearing is an  important component to your healing fracture or injury. If you put weight on your extremity too soon, the fixation (plates/screws) could loosen and cause your fracture to shift or move.   It is crucial you listen to your surgeon regarding weight bearing recommendations.    After surgery your weight bearing status is determined by your surgeon. For a significant portion of lower extremity and upper extremity fractures, this will be a non-weightbearing or touch-down weight bearing status. Usually this is continued for 6-10 weeks before you are allowed to start weightbearing.    Weightbearing as tolerated (WBAT)  This means that you can put as much weight as you can tolerated through your arm or leg. The key is \"AS TOLERATED\". You should not push through the pain and use your pain as a guide. Often times you will have a brace to provide increased stability.    Partial Weightbearing (PWB)  This status allows you to put some weight through your leg. Usually it is a percentage of full body weight. It is important you do not increase the amount of weight above what you are told to do.    Touchdown Weightbearing (TDWB)  This allows you to use your leg for balance, but does not allow you to put weight on your leg. It is often used with fractures higher up in the leg (hip or pelvis) to decrease the strain on the muscles around the hip.    Non-weightbearing (NWB)  You are not allowed to put any weight on your leg or arm. This is usually used when the fixation (i.e. plates and screws) cannot withstand repetitive stress of walking or lifting. The bone must be allowed to heal some before you can do more.

## 2024-10-16 NOTE — ANESTHESIA POSTPROCEDURE EVALUATION
Department of Anesthesiology  Postprocedure Note    Patient: Daniel Mcdonald  MRN: [id number]  YOB: 1955  Date of evaluation: 10/16/2024    Procedure Summary       Date: 10/16/24 Room / Location: [address]    Anesthesia Start: 1154 Anesthesia Stop: 1410    Procedure: Left Ankle Irrigation and Debridement with Placement of Left Ankle Spanning External Fixator (Left: Ankle) Diagnosis:       Type III open fracture of left ankle, initial encounter      (Type III open fracture of left ankle, initial encounter [S82.892C])    Surgeons: Jose Morrissey DO Responsible Provider: Karine Young MD    Anesthesia Type: general ASA Status: 3            Anesthesia Type: No value filed.    Mookie Phase I: Mookie Score: 8    Mookie Phase II:      Anesthesia Post Evaluation    Patient location during evaluation: PACU  Patient participation: complete - patient participated  Level of consciousness: awake  Airway patency: patent  Nausea & Vomiting: no nausea and no vomiting  Cardiovascular status: hemodynamically stable  Respiratory status: acceptable  Hydration status: euvolemic  Pain management: adequate    No notable events documented.

## 2024-10-16 NOTE — OP NOTE
Operative Note      Patient: Daniel Mcdonald  YOB: 1955  MRN: [id number]    Date of Procedure: 10/16/2024    Pre-Op Diagnosis Codes:   Grade 3A open left ankle bimalleolar fracture dislocation  18 cm complex open wound left anterior medial ankle  First MTP joint dislocation  Morbid obesity  Uncontrolled type II diabetic    Post-Op Diagnosis: Same       Procedure(s):  Excisional irrigation and debridement open fracture site left ankle including skin, subcutaneous tissue, myofascial tissue and bone  Left medial malleolus fracture open reduction internal fixation  Application left ankle multiplanar external fixation  Closed reduction percutaneous pinning first MTP joint dislocation  Closure complex open wound left anteromedial ankle 18 cm in length    Surgeon(s):  Jose Morrissey DO    Assistant:   Resident: Romeo Dye DO; Dex Baker DO    Anesthesia: General    Estimated Blood Loss (mL): less than 100     Complications: None    Specimens:   * No specimens in log *    Implants:  Implant Name Type Inv. Item Serial No.  Lot No. LRB No. Used Action   SCREW BNE [id number] S STL JACKSON SHT 1/3 THRD SM HEX SOCK - [id number]  SCREW BNE L44MM [id number] S STL JACKSON SHT 1/3 THRD SM HEX SOCK  [id number]  Left 1 Implanted         Drains: * No LDAs found *    Findings:  Infection Present At Time Of Surgery (PATOS) (choose all levels that have infection present):  No infection present  Other Findings: See details below    Detailed Description of Procedure:     Patient presented to the Emergency Department with a open left trimalleolar ankle fracture with an 18 cm wound over the medial aspect of the distal tibia extending into the diaphyseal region of the tibia.  On presentation his foot was pulseless however after reduction in the emergency department pulses did return and he had brisk capillary refill and equal to contralateral side.  He was brought to the operating room  understands.    Electronically signed by   Jose Morrissey DO  10/16/2024     NOTE: This report was transcribed using voice recognition software. Every effort was made to ensure accuracy; however, inadvertent computerized transcription errors may be present

## 2024-10-16 NOTE — ANESTHESIA PRE PROCEDURE
Department of Anesthesiology  Preprocedure Note       Name:  Daniel Mcdonald   Age:  68 y.o.  :  1955                                          MRN:  [id number]         Date:  10/16/2024      Surgeon: Surgeon(s):  Jose Morrissey DO    Procedure: Procedure(s):  LEFT ANKLE EXTERNAL FIXATOR APPLICATION, WITH I/D    Medications prior to admission:   Prior to Admission medications    Medication Sig Start Date End Date Taking? Authorizing Provider   metoprolol tartrate (LOPRESSOR) 25 MG tablet Take 1 tablet by mouth 2 times daily 1/15/24   Geoff Kauffman DO   warfarin (COUMADIN) 2.5 MG tablet Take 2 tablets by mouth daily 10/2/23   Geoff Kauffman DO   glipiZIDE (GLUCOTROL) 5 MG tablet Take 1 tablet by mouth daily 23   Geoff Kauffman DO   pantoprazole (PROTONIX) 20 MG tablet Take 1 tablet by mouth daily 23   Geoff Kauffman DO   diclofenac (VOLTAREN) 50 MG EC tablet Take 1 tablet by mouth 2 times daily 23   Geoff Kauffman DO   ferrous sulfate (FE TABS 325) 325 (65 Fe) MG EC tablet Take 1 tablet by mouth daily (with breakfast) 23   Geoff Kauffman DO   rosuvastatin (CRESTOR) 10 MG tablet Take 1 tablet by mouth nightly 22   Geoff Kauffman DO   D-5000 125 MCG (5000 UT) TABS tablet TAKE 1 TABLET BY MOUTH EVERY DAY 22   Geoff Kauffman DO   allopurinol (ZYLOPRIM) 100 MG tablet Take 1 tablet by mouth in the morning. 8/15/22   Geoff Kauffman DO   insulin lispro (HUMALOG) 100 UNIT/ML injection vial Inject 0-6 Units into the skin 3 times daily (with meals) 10/22/19   Geoff Kauffman DO   insulin lispro (HUMALOG) 100 UNIT/ML injection vial Inject 0-3 Units into the skin nightly 10/22/19   Geoff Kauffman DO   torsemide (DEMADEX) 20 MG tablet Take 20 mg by mouth daily    ProviderLeanne MD   aspirin 81 MG tablet Take 81 mg by mouth daily    Leanne Dong MD LANTUS SOLOSTAR 100 UNIT/ML injection pen

## 2024-10-16 NOTE — ED PROVIDER NOTES
[facility]  EMERGENCY DEPARTMENT ENCOUNTER        Pt Name: Daniel Mcdonald  MRN: [id number]  Birthdate 1955  Date of evaluation: 10/16/2024  Provider: Jamila Carrero DO  PCP: Geoff Kauffman DO  Note Started: 11:09 AM EDT 10/16/24    CHIEF COMPLAINT       Chief Complaint   Patient presents with    Fall     Fall from standing. Open Left ankle fx with deformity. +thinners -hit head       HISTORY OF PRESENT ILLNESS: 1 or more Elements   History From: Patient     Limitations to history : None    Daniel Mcdonald is a 68 y.o. male with past medical history of morbid obesity, obstructive sleep apnea, type 2 diabetes, CAD, hyperlipidemia, hypertension who presents after a fall.  The patient states that he had a TIA last week and has been having residual left-sided weakness since.  This morning around 9 AM, he was walking around his house when his left side became weak and he fell onto his left leg.  His left leg was folded underneath him.  He could not get up and then called 911.  He states he was lying on the ground for about 10 minutes prior to EMS arrival. When EMS arrived, they noted a large soft tissue deformity to the left lower extremity/ankle.  The patient is on Coumadin.  He denies hitting his head or ever losing consciousness.  He denies pain anywhere other than his left lower extremity    Patient denies fever, chills, headache, shortness of breath, chest pain, abdominal pain, nausea, vomiting, diarrhea, lightheadedness, dysuria, hematuria, hematochezia, and melena.    Nursing Notes were all reviewed and agreed with or any disagreements were addressed in the HPI.        REVIEW OF EXTERNAL NOTES :         Reviewed discharge summary from October 2024      REVIEW OF SYSTEMS :           Positives and Pertinent negatives as per HPI.     SURGICAL HISTORY     Past Surgical History:   Procedure Laterality Date    CARDIAC SURGERY  05/13/2019    ACB x 5 at [facility]    COLONOSCOPY  01/24/2006

## 2024-10-17 ENCOUNTER — APPOINTMENT (OUTPATIENT)
Dept: CT IMAGING | Age: 69
End: 2024-10-17
Payer: MEDICARE

## 2024-10-17 ENCOUNTER — APPOINTMENT (OUTPATIENT)
Dept: CT IMAGING | Age: 69
End: 2024-10-17
Attending: STUDENT IN AN ORGANIZED HEALTH CARE EDUCATION/TRAINING PROGRAM
Payer: MEDICARE

## 2024-10-17 VITALS
RESPIRATION RATE: 20 BRPM | OXYGEN SATURATION: 94 % | HEIGHT: 69 IN | DIASTOLIC BLOOD PRESSURE: 46 MMHG | TEMPERATURE: 97.2 F | SYSTOLIC BLOOD PRESSURE: 129 MMHG | BODY MASS INDEX: 46.65 KG/M2 | WEIGHT: 315 LBS | HEART RATE: 75 BPM

## 2024-10-17 LAB
ANION GAP SERPL CALCULATED.3IONS-SCNC: 9 MMOL/L (ref 7–16)
BASOPHILS # BLD: 0 K/UL (ref 0–0.2)
BASOPHILS NFR BLD: 0 % (ref 0–2)
BUN SERPL-MCNC: 41 MG/DL (ref 6–23)
CALCIUM SERPL-MCNC: 8.1 MG/DL (ref 8.6–10.2)
CHLORIDE SERPL-SCNC: 107 MMOL/L (ref 98–107)
CO2 SERPL-SCNC: 21 MMOL/L (ref 22–29)
CREAT SERPL-MCNC: 2.1 MG/DL (ref 0.7–1.2)
EOSINOPHIL # BLD: 0 K/UL (ref 0.05–0.5)
EOSINOPHILS RELATIVE PERCENT: 0 % (ref 0–6)
ERYTHROCYTE [DISTWIDTH] IN BLOOD BY AUTOMATED COUNT: 16.7 % (ref 11.5–15)
GFR, ESTIMATED: 33 ML/MIN/1.73M2
GLUCOSE BLD-MCNC: 294 MG/DL (ref 74–99)
GLUCOSE SERPL-MCNC: 301 MG/DL (ref 74–99)
HCT VFR BLD AUTO: 41.7 % (ref 37–54)
HGB BLD-MCNC: 11.9 G/DL (ref 12.5–16.5)
LYMPHOCYTES NFR BLD: 0.25 K/UL (ref 1.5–4)
LYMPHOCYTES RELATIVE PERCENT: 2 % (ref 20–42)
MCH RBC QN AUTO: 22.4 PG (ref 26–35)
MCHC RBC AUTO-ENTMCNC: 28.5 G/DL (ref 32–34.5)
MCV RBC AUTO: 78.4 FL (ref 80–99.9)
MONOCYTES NFR BLD: 0.89 K/UL (ref 0.1–0.95)
MONOCYTES NFR BLD: 6 % (ref 2–12)
NEUTROPHILS NFR BLD: 92 % (ref 43–80)
NEUTS SEG NFR BLD: 13.36 K/UL (ref 1.8–7.3)
PLATELET # BLD AUTO: 434 K/UL (ref 130–450)
PMV BLD AUTO: 10.9 FL (ref 7–12)
POTASSIUM SERPL-SCNC: 6.3 MMOL/L (ref 3.5–5)
RBC # BLD AUTO: 5.32 M/UL (ref 3.8–5.8)
RBC # BLD: ABNORMAL 10*6/UL
SODIUM SERPL-SCNC: 137 MMOL/L (ref 132–146)
WBC OTHER # BLD: 14.5 K/UL (ref 4.5–11.5)

## 2024-10-17 PROCEDURE — 94660 CPAP INITIATION&MGMT: CPT

## 2024-10-17 PROCEDURE — 85025 COMPLETE CBC W/AUTO DIFF WBC: CPT

## 2024-10-17 PROCEDURE — 73700 CT LOWER EXTREMITY W/O DYE: CPT

## 2024-10-17 PROCEDURE — 72192 CT PELVIS W/O DYE: CPT

## 2024-10-17 PROCEDURE — 6360000002 HC RX W HCPCS: Performed by: PHYSICAL THERAPY ASSISTANT

## 2024-10-17 PROCEDURE — 6370000000 HC RX 637 (ALT 250 FOR IP)

## 2024-10-17 PROCEDURE — 82962 GLUCOSE BLOOD TEST: CPT

## 2024-10-17 PROCEDURE — 2580000003 HC RX 258: Performed by: STUDENT IN AN ORGANIZED HEALTH CARE EDUCATION/TRAINING PROGRAM

## 2024-10-17 PROCEDURE — 2580000003 HC RX 258: Performed by: PHYSICAL THERAPY ASSISTANT

## 2024-10-17 PROCEDURE — 6370000000 HC RX 637 (ALT 250 FOR IP): Performed by: PHYSICAL THERAPY ASSISTANT

## 2024-10-17 PROCEDURE — 6360000002 HC RX W HCPCS

## 2024-10-17 PROCEDURE — 99238 HOSP IP/OBS DSCHRG MGMT 30/<: CPT

## 2024-10-17 PROCEDURE — 80048 BASIC METABOLIC PNL TOTAL CA: CPT

## 2024-10-17 PROCEDURE — 2580000003 HC RX 258

## 2024-10-17 PROCEDURE — 36415 COLL VENOUS BLD VENIPUNCTURE: CPT

## 2024-10-17 PROCEDURE — 6370000000 HC RX 637 (ALT 250 FOR IP): Performed by: STUDENT IN AN ORGANIZED HEALTH CARE EDUCATION/TRAINING PROGRAM

## 2024-10-17 PROCEDURE — 70450 CT HEAD/BRAIN W/O DYE: CPT

## 2024-10-17 RX ORDER — DEXTROSE MONOHYDRATE 100 MG/ML
INJECTION, SOLUTION INTRAVENOUS CONTINUOUS PRN
Status: DISCONTINUED | OUTPATIENT
Start: 2024-10-17 | End: 2024-10-17 | Stop reason: SDUPTHER

## 2024-10-17 RX ORDER — CALCIUM GLUCONATE 20 MG/ML
1000 INJECTION, SOLUTION INTRAVENOUS ONCE
Status: COMPLETED | OUTPATIENT
Start: 2024-10-17 | End: 2024-10-17

## 2024-10-17 RX ORDER — GLUCAGON 1 MG/ML
1 KIT INJECTION PRN
Status: DISCONTINUED | OUTPATIENT
Start: 2024-10-17 | End: 2024-10-17 | Stop reason: SDUPTHER

## 2024-10-17 RX ADMIN — SODIUM CHLORIDE, PRESERVATIVE FREE 10 ML: 5 INJECTION INTRAVENOUS at 09:31

## 2024-10-17 RX ADMIN — CEFAZOLIN 2000 MG: 2 INJECTION, POWDER, FOR SOLUTION INTRAMUSCULAR; INTRAVENOUS at 06:18

## 2024-10-17 RX ADMIN — OXYCODONE HYDROCHLORIDE AND ACETAMINOPHEN 1 TABLET: 5; 325 TABLET ORAL at 12:33

## 2024-10-17 RX ADMIN — CALCIUM GLUCONATE 1000 MG: 20 INJECTION, SOLUTION INTRAVENOUS at 09:29

## 2024-10-17 RX ADMIN — PANTOPRAZOLE SODIUM 20 MG: 20 TABLET, DELAYED RELEASE ORAL at 09:20

## 2024-10-17 RX ADMIN — OXYCODONE HYDROCHLORIDE AND ACETAMINOPHEN 1 TABLET: 5; 325 TABLET ORAL at 04:21

## 2024-10-17 RX ADMIN — ASPIRIN 81 MG: 81 TABLET, COATED ORAL at 09:15

## 2024-10-17 RX ADMIN — METOPROLOL TARTRATE 25 MG: 25 TABLET, FILM COATED ORAL at 09:15

## 2024-10-17 RX ADMIN — SODIUM ZIRCONIUM CYCLOSILICATE 10 G: 10 POWDER, FOR SUSPENSION ORAL at 09:16

## 2024-10-17 RX ADMIN — DEXTROSE MONOHYDRATE 250 ML: 100 INJECTION, SOLUTION INTRAVENOUS at 09:30

## 2024-10-17 RX ADMIN — INSULIN HUMAN 10 UNITS: 100 INJECTION, SOLUTION PARENTERAL at 09:16

## 2024-10-17 RX ADMIN — INSULIN LISPRO 4 UNITS: 100 INJECTION, SOLUTION INTRAVENOUS; SUBCUTANEOUS at 11:35

## 2024-10-17 ASSESSMENT — PAIN DESCRIPTION - DESCRIPTORS
DESCRIPTORS: SHARP
DESCRIPTORS: GNAWING;ACHING
DESCRIPTORS: SHOOTING;STABBING

## 2024-10-17 ASSESSMENT — PAIN DESCRIPTION - PAIN TYPE: TYPE: SURGICAL PAIN

## 2024-10-17 ASSESSMENT — PAIN SCALES - GENERAL
PAINLEVEL_OUTOF10: 4
PAINLEVEL_OUTOF10: 9
PAINLEVEL_OUTOF10: 4
PAINLEVEL_OUTOF10: 6

## 2024-10-17 ASSESSMENT — PAIN DESCRIPTION - ORIENTATION
ORIENTATION: LEFT

## 2024-10-17 ASSESSMENT — PAIN DESCRIPTION - LOCATION
LOCATION: FOOT
LOCATION: ANKLE
LOCATION: FOOT

## 2024-10-17 ASSESSMENT — PAIN SCALES - WONG BAKER: WONGBAKER_NUMERICALRESPONSE: HURTS A LITTLE BIT

## 2024-10-17 NOTE — PROGRESS NOTES
Patient accepted at [facility] Main J83, Bed 19. Nurse to nurse [phone]. Transfer line wants imaging and notes sent with him. Electronically signed by Lulu Wolf RN on 10/17/2024 at 9:34 AM

## 2024-10-17 NOTE — PROGRESS NOTES
Department of Orthopedic Surgery  Resident Progress Note    Patient seen and at bedside today.  He is resting comfortably in no acute distress.  He does state he has some continued pain to the left ankle and great toe.  He does note he has been unsatisfied with the care on the floor and is planning to transfer to [facility]. No new complaints.  Denies chest pain, shortness of breath, dizziness/lightheadedness.    VITALS:  BP (!) 109/54   Pulse 75   Temp 97.2 °F (36.2 °C) (Temporal)   Resp 22   Ht 1.753 m (5' 9\")   Wt (!) 174.9 kg (385 lb 9.4 oz)   SpO2 96%   BMI 56.94 kg/m²     General: Awake alert and in no acute distress    MUSCULOSKELETAL:   left lower extremity:  External fixator pins and bars in place without signs of loosening or failure.  Mild drainage noted about the dressing  K wire and great toe  Compartments soft and compressible  Unable to assess plantarflexion/dorsiflexion/great toe extension  +2/4 DP & PT pulses, Brisk Cap refill, Toes warm and perfused  Sensation to light touch at baseline level and equal to contralateral side in superficial deep peroneal, sural, tibial nerve distributions of the foot and ankle   CBC:   Lab Results   Component Value Date/Time    WBC 14.5 10/17/2024 05:01 AM    HGB 11.9 10/17/2024 05:01 AM    HCT 41.7 10/17/2024 05:01 AM     10/17/2024 05:01 AM     PT/INR:    Lab Results   Component Value Date/Time    PROTIME 21.3 10/16/2024 10:32 AM    INR 1.9 10/16/2024 10:32 AM       EXAMINATION:  CT OF THE PELVIS WITHOUT CONTRAST 10/17/2024 11:10 am     TECHNIQUE:  CT of the pelvis was performed without the administration of intravenous  contrast.  Multiplanar reformatted images are provided for review.  Adjustment of mA and/or kV according to patient size was utilized.  Automated  exposure control, iterative reconstruction, and/or weight based adjustment of  the mA/kV was utilized to reduce the radiation dose to as low as reasonably  achievable.      COMPARISON:  None.     HISTORY  ORDERING SYSTEM PROVIDED HISTORY: pain  TECHNOLOGIST PROVIDED HISTORY:  Reason for exam:->pain  Additional Contrast?->None  What reading provider will be dictating this exam?->CRC     FINDINGS:  Bones: No evidence of acute fracture or dislocation. No aggressive appearing  osseous abnormality or periostitis.     Soft Tissue: No significant soft tissue edema or fluid collections.     Joint: Mild-to-moderate bilateral hip degenerative changes.  Moderate  degenerative changes of the bilateral SI joints without widening or  irregularity.  No osseous erosions.     IMPRESSION:  1. No acute osseous abnormality.  2. Mild-to-moderate bilateral hip degenerative changes.  3. Moderate degenerative changes of the bilateral SI joints.     ASSESSMENT  S/P Left open Ankle fx/dl Irrigation and Debridement with Placement of Left Ankle Spanning External Fixator; open reduction fixation of medial malleolus; pinning of great toe    PLAN    Continue physical therapy and protocol: NWB left lower extremity  Postoperative antibiotic protocol  Deep venous thrombosis prophylaxis: 81 mg twice daily okay to resume postop day 1, early mobilization, SCD  PT/OT as able  Pain Control: IV and PO, wean as able  Monitor H&H  D/C Plan:  pending sw/cm and therapy recommendations.     Orthopaedic Attending    I have seen and evaluated the patient with the resident and agree with the above assessments on today's visit. I have performed the key components of the history and physical examination and concur completely with the findings and plans as documented above.    Antibiotics per open fracture protocol  DVT prophylaxis  Nonweightbearing affected extremity  Continue medical management  Strict blood glucose control    Electronically signed by   Jose Morrissey DO  10/17/2024

## 2024-10-17 NOTE — PROCEDURES
PROCEDURE NOTE  Date: 10/16/2024   Name: Daniel Mcdonald  YOB: 1955    Procedures  CT informed that patient is refusing scans. Please call [phone] if anything changes and patient is able to do scans.

## 2024-10-17 NOTE — PROGRESS NOTES
Patient refused to go down for CT scan. He states he was tired and \"just didn't feel like going\". I tried to convince him twice, explaining the benefits and why it would be in his best interest at which time he still declined.

## 2024-10-17 NOTE — DISCHARGE SUMMARY
[facility] Hospitalist Physician Discharge Summary       Jose Morrissey, DO  [address] [id number] [phone]    Follow up in 2 week(s)  Post operative follow up      Activity level: As tolerated     Dispo: Transfer to [facility]    Condition on discharge: Stable     Patient ID:  Daniel Mcdonald  [id number]  68 y.o.  1955    Admit date: 10/16/2024    Discharge date and time:  10/17/2024  11:40 AM    Admission Diagnoses: Principal Problem:    Ankle fracture, left, open type III, initial encounter  Resolved Problems:    * No resolved hospital problems. *      Discharge Diagnoses: Principal Problem:    Ankle fracture, left, open type III, initial encounter  Resolved Problems:    * No resolved hospital problems. *      Consults:  IP CONSULT TO ORTHOPEDIC SURGERY  IP CONSULT TO HOSPITALIST    Procedures: status post left ankle irrigation and debridement with placement of left ankle spanning external fixator, open reduction fixation of medial malleolus, pinning of great toe.     Hospital Course:   Patient Daniel Mcdonald is a 68 y.o. presented with Ankle fracture, left, open type III, initial encounter [S82.892C]    68-year-old male with a past medical history of CAD, diabetes, hyperlipidemia, hypertension, SUKHWINDER, morbid obesity admitted to the hospital from home after fall from standing yesterday morning and subsequently had left ankle pain with deformity.  Ankle x-ray show left ankle open fracture. Orthopedics surgeon was consulted.   Patient had status post left ankle irrigation and debridement with placement of left ankle spanning external fixator, open reduction fixation of medial malleolus, pinning of great toe.   For hyperkalemia patient received calcium gluconate, insulin and dextrose, and Lokelma.  Patient refused to go down for CT scan of head    Patient request for transfer to [facility], patient was accepted and transfer to [place]      7/5/2018        Bib Dias      This is to certify that the above named patient had an appointment at this hospital for professional attention on 7/5/18.    Kym Banks, BART    [address] [address]     tablet by mouth daily     metoprolol tartrate 25 MG tablet  Commonly known as: LOPRESSOR  Take 1 tablet by mouth 2 times daily     OXYGEN     pantoprazole 20 MG tablet  Commonly known as: PROTONIX  Take 1 tablet by mouth daily     warfarin 2.5 MG tablet  Commonly known as: COUMADIN  Take 2 tablets by mouth daily            STOP taking these medications      allopurinol 100 MG tablet  Commonly known as: ZYLOPRIM     D-5000 125 MCG (5000 UT) Tabs tablet  Generic drug: vitamin D3     diclofenac 50 MG EC tablet  Commonly known as: VOLTAREN     ferrous sulfate 325 (65 Fe) MG EC tablet  Commonly known as: FE TABS 325     insulin lispro 100 UNIT/ML injection vial  Commonly known as: HUMALOG     Lantus SoloStar 100 UNIT/ML injection pen  Generic drug: insulin glargine     rosuvastatin 10 MG tablet  Commonly known as: Crestor     torsemide 20 MG tablet  Commonly known as: DEMADEX               Where to Get Your Medications        These medications were sent to [id number] - [place], [place] - [address]  - P [phone] - F [phone]  [facility] [address]      Phone: [phone]   acetaminophen-Codeine 300-15 MG per tablet  aspirin 81 MG tablet           Note that more than 30 minutes was spent in preparing discharge papers, discussing discharge with patient, medication review, etc.    Signed:  Electronically signed by ALEJANDRO CARTER MD on 10/17/2024 at 11:40 AM

## 2024-10-17 NOTE — CARE COORDINATION
Patient requested transfer to [facility] for ankle fx. He was accepted room J83 bed 19. Discussed with him that access center called me and told me to set up transport. He discussed with me that [facility] has their own ambulance transport. I did confirm this with him but explained if they want me to set it up I do need to use local company. I also discussed transfer to tertiary care center when patient is requesting and not medically necessary medicare does not cover the cost of transportation. Discussed with him physicians ambulance will transport but they do require upfront payment of $2360.16. Patient stated \"I have never paid for ambulance transportation.\" Discussed with him that the resources available to me require payment. Patient has personal connections at [facility], reaching out to them himself to arrange transport. Information regarding transportation with physicians along with upfront costs left with him at bedside. I let him know I could go over with him again if necessary. He did call his contact while I was at bedside and person on the other end of the line said \"I'm working on it.\" [facility] called back after I left the room and said they will be here at 12:30pm to transport.     For questions I can be reached at [phone]. SUGAR Robb

## 2024-12-12 ENCOUNTER — TELEPHONE (OUTPATIENT)
Dept: ORTHOPEDIC SURGERY | Age: 69
End: 2024-12-12

## 2024-12-12 NOTE — TELEPHONE ENCOUNTER
Pt's wife called. Pt had surgery with Dr Morrissey on 10/17/24 and had ex fix placed to left ankle.  Pt was then transferred to [facility]. Wife states that d/t pt's size, he needed to be somewhere where he could get additional care. He had the remainder of his surgeries and follow ups in [place].  They are talking about transferring him back to [place], but she doesn't know where. States he still isn't ready for rehab because he still can't bear wt to left leg. He has a follow up appt 12/17/24 at [facility].  She is requesting that he follow with Dr Morrissey once he is transferred to a facility in [facility], hopefully next week.  Some of his records are in Care Everywhere.  Will we continue to follow here? If so, when do you want him scheduled?

## 2024-12-12 NOTE — TELEPHONE ENCOUNTER
Notified wife. She will call for appt once they know where pt will be and when he is being transferred

## 2025-01-15 ENCOUNTER — OUTSIDE SERVICES (OUTPATIENT)
Dept: PHYSICAL MEDICINE AND REHAB | Age: 70
End: 2025-01-15

## 2025-01-15 DIAGNOSIS — I50.30 DIASTOLIC HEART FAILURE, UNSPECIFIED HF CHRONICITY (HCC): ICD-10-CM

## 2025-01-15 DIAGNOSIS — S82.892B OPEN FRACTURE DISLOCATION OF LEFT ANKLE: Primary | ICD-10-CM

## 2025-01-15 DIAGNOSIS — E66.09 OBESITY DUE TO EXCESS CALORIES WITH SERIOUS COMORBIDITY, UNSPECIFIED CLASS: ICD-10-CM

## 2025-01-17 ENCOUNTER — TELEPHONE (OUTPATIENT)
Dept: ORTHOPEDIC SURGERY | Age: 70
End: 2025-01-17

## 2025-01-17 NOTE — TELEPHONE ENCOUNTER
Spoke with Karol Hernandez Mgr @ Rehab states that they will call if f/u is needed.  Notes from Ortho [facility] forwarded to facility via fax with orders for wt bearing.

## 2025-01-17 NOTE — TELEPHONE ENCOUNTER
Called [phone] and left a message for Paulette to call our office to schedule an appointment for 3-4 weeks.   Ortega to instruct on wt bearing.

## 2025-01-17 NOTE — TELEPHONE ENCOUNTER
Karol Hernandez Mgr [facility] called regarding f/u for pt. Currently at Rehab [facility] [address]. Saw Ortho 1/8 in [facility] with order for boot and therapy. Pls advise when to see pt. [phone]

## 2025-01-27 NOTE — PROGRESS NOTES
Patient is admitted to [facility].  All documentation in [employer].      Luis Enrique Barnett DO

## 2025-02-01 ENCOUNTER — HOSPITAL ENCOUNTER (INPATIENT)
Age: 70
LOS: 3 days | Discharge: HOME | DRG: 394 | End: 2025-02-05
Attending: STUDENT IN AN ORGANIZED HEALTH CARE EDUCATION/TRAINING PROGRAM | Admitting: FAMILY MEDICINE
Payer: MEDICARE

## 2025-02-01 ENCOUNTER — APPOINTMENT (OUTPATIENT)
Dept: CT IMAGING | Age: 70
DRG: 394 | End: 2025-02-01
Payer: MEDICARE

## 2025-02-01 DIAGNOSIS — R79.89 ELEVATED TROPONIN: ICD-10-CM

## 2025-02-01 DIAGNOSIS — D64.9 ANEMIA, UNSPECIFIED TYPE: Primary | ICD-10-CM

## 2025-02-01 DIAGNOSIS — K59.03 DRUG-INDUCED CONSTIPATION: ICD-10-CM

## 2025-02-01 LAB
ALBUMIN SERPL-MCNC: 3.5 G/DL (ref 3.5–5.2)
ALP SERPL-CCNC: 62 U/L (ref 40–129)
ALT SERPL-CCNC: 7 U/L (ref 0–40)
ANION GAP SERPL CALCULATED.3IONS-SCNC: 9 MMOL/L (ref 7–16)
AST SERPL-CCNC: 9 U/L (ref 0–39)
BASOPHILS # BLD: 0.06 K/UL (ref 0–0.2)
BASOPHILS NFR BLD: 1 % (ref 0–2)
BILIRUB SERPL-MCNC: 0.4 MG/DL (ref 0–1.2)
BUN SERPL-MCNC: 61 MG/DL (ref 6–23)
CALCIUM SERPL-MCNC: 9.8 MG/DL (ref 8.6–10.2)
CHLORIDE SERPL-SCNC: 105 MMOL/L (ref 98–107)
CO2 SERPL-SCNC: 29 MMOL/L (ref 22–29)
CREAT SERPL-MCNC: 1.6 MG/DL (ref 0.7–1.2)
EOSINOPHIL # BLD: 0.13 K/UL (ref 0.05–0.5)
EOSINOPHILS RELATIVE PERCENT: 1 % (ref 0–6)
ERYTHROCYTE [DISTWIDTH] IN BLOOD BY AUTOMATED COUNT: 18.7 % (ref 11.5–15)
GFR, ESTIMATED: 46 ML/MIN/1.73M2
GLUCOSE SERPL-MCNC: 175 MG/DL (ref 74–99)
HCT VFR BLD AUTO: 34.9 % (ref 37–54)
HGB BLD-MCNC: 9.9 G/DL (ref 12.5–16.5)
IMM GRANULOCYTES # BLD AUTO: 0.03 K/UL (ref 0–0.58)
IMM GRANULOCYTES NFR BLD: 0 % (ref 0–5)
INR PPP: 1.7
LACTATE BLDV-SCNC: 0.9 MMOL/L (ref 0.5–2.2)
LIPASE SERPL-CCNC: 22 U/L (ref 13–60)
LYMPHOCYTES NFR BLD: 0.7 K/UL (ref 1.5–4)
LYMPHOCYTES RELATIVE PERCENT: 7 % (ref 20–42)
MCH RBC QN AUTO: 19 PG (ref 26–35)
MCHC RBC AUTO-ENTMCNC: 28.4 G/DL (ref 32–34.5)
MCV RBC AUTO: 67.1 FL (ref 80–99.9)
MONOCYTES NFR BLD: 0.43 K/UL (ref 0.1–0.95)
MONOCYTES NFR BLD: 5 % (ref 2–12)
NEUTROPHILS NFR BLD: 86 % (ref 43–80)
NEUTS SEG NFR BLD: 8.1 K/UL (ref 1.8–7.3)
PARTIAL THROMBOPLASTIN TIME: 36.6 SEC (ref 24.5–35.1)
PLATELET # BLD AUTO: 321 K/UL (ref 130–450)
PLATELET CONFIRMATION: NORMAL
PMV BLD AUTO: 10 FL (ref 7–12)
POTASSIUM SERPL-SCNC: 5.5 MMOL/L (ref 3.5–5)
PROT SERPL-MCNC: 6.5 G/DL (ref 6.4–8.3)
PROTHROMBIN TIME: 19 SEC (ref 9.3–12.4)
RBC # BLD AUTO: 5.2 M/UL (ref 3.8–5.8)
RBC # BLD: ABNORMAL 10*6/UL
SODIUM SERPL-SCNC: 143 MMOL/L (ref 132–146)
TROPONIN I SERPL HS-MCNC: 89 NG/L (ref 0–11)
WBC OTHER # BLD: 9.5 K/UL (ref 4.5–11.5)

## 2025-02-01 PROCEDURE — 96375 TX/PRO/DX INJ NEW DRUG ADDON: CPT

## 2025-02-01 PROCEDURE — 96374 THER/PROPH/DIAG INJ IV PUSH: CPT

## 2025-02-01 PROCEDURE — 85025 COMPLETE CBC W/AUTO DIFF WBC: CPT

## 2025-02-01 PROCEDURE — 84484 ASSAY OF TROPONIN QUANT: CPT

## 2025-02-01 PROCEDURE — 6370000000 HC RX 637 (ALT 250 FOR IP)

## 2025-02-01 PROCEDURE — 85610 PROTHROMBIN TIME: CPT

## 2025-02-01 PROCEDURE — 6360000002 HC RX W HCPCS

## 2025-02-01 PROCEDURE — 80053 COMPREHEN METABOLIC PANEL: CPT

## 2025-02-01 PROCEDURE — 83690 ASSAY OF LIPASE: CPT

## 2025-02-01 PROCEDURE — 99285 EMERGENCY DEPT VISIT HI MDM: CPT

## 2025-02-01 PROCEDURE — 74176 CT ABD & PELVIS W/O CONTRAST: CPT

## 2025-02-01 PROCEDURE — 83605 ASSAY OF LACTIC ACID: CPT

## 2025-02-01 PROCEDURE — 85730 THROMBOPLASTIN TIME PARTIAL: CPT

## 2025-02-01 PROCEDURE — 93005 ELECTROCARDIOGRAM TRACING: CPT | Performed by: STUDENT IN AN ORGANIZED HEALTH CARE EDUCATION/TRAINING PROGRAM

## 2025-02-01 RX ORDER — MORPHINE SULFATE 4 MG/ML
4 INJECTION, SOLUTION INTRAMUSCULAR; INTRAVENOUS ONCE
Status: COMPLETED | OUTPATIENT
Start: 2025-02-01 | End: 2025-02-01

## 2025-02-01 RX ORDER — FUROSEMIDE 10 MG/ML
20 INJECTION INTRAMUSCULAR; INTRAVENOUS ONCE
Status: COMPLETED | OUTPATIENT
Start: 2025-02-01 | End: 2025-02-01

## 2025-02-01 RX ADMIN — MORPHINE SULFATE 4 MG: 4 INJECTION, SOLUTION INTRAMUSCULAR; INTRAVENOUS at 22:49

## 2025-02-01 RX ADMIN — SODIUM ZIRCONIUM CYCLOSILICATE 10 G: 10 POWDER, FOR SUSPENSION ORAL at 21:10

## 2025-02-01 RX ADMIN — FUROSEMIDE 20 MG: 10 INJECTION, SOLUTION INTRAMUSCULAR; INTRAVENOUS at 22:47

## 2025-02-01 ASSESSMENT — PAIN SCALES - GENERAL: PAINLEVEL_OUTOF10: 8

## 2025-02-01 ASSESSMENT — PAIN DESCRIPTION - DESCRIPTORS: DESCRIPTORS: ACHING;DISCOMFORT;SORE

## 2025-02-01 ASSESSMENT — PAIN DESCRIPTION - LOCATION: LOCATION: HIP;BUTTOCKS

## 2025-02-01 ASSESSMENT — PAIN - FUNCTIONAL ASSESSMENT: PAIN_FUNCTIONAL_ASSESSMENT: NONE - DENIES PAIN

## 2025-02-02 PROBLEM — D64.9 ANEMIA: Status: ACTIVE | Noted: 2025-02-02

## 2025-02-02 LAB
FERRITIN SERPL-MCNC: 14 NG/ML
GLUCOSE BLD-MCNC: 116 MG/DL (ref 74–99)
GLUCOSE BLD-MCNC: 156 MG/DL (ref 74–99)
GLUCOSE BLD-MCNC: 81 MG/DL (ref 74–99)
GLUCOSE BLD-MCNC: 90 MG/DL (ref 74–99)
IRON SATN MFR SERPL: 11 % (ref 20–55)
IRON SERPL-MCNC: 29 UG/DL (ref 59–158)
TIBC SERPL-MCNC: 265 UG/DL (ref 250–450)
TROPONIN I SERPL HS-MCNC: 87 NG/L (ref 0–11)

## 2025-02-02 PROCEDURE — 2060000000 HC ICU INTERMEDIATE R&B

## 2025-02-02 PROCEDURE — 6370000000 HC RX 637 (ALT 250 FOR IP): Performed by: FAMILY MEDICINE

## 2025-02-02 PROCEDURE — 36415 COLL VENOUS BLD VENIPUNCTURE: CPT

## 2025-02-02 PROCEDURE — 99222 1ST HOSP IP/OBS MODERATE 55: CPT | Performed by: FAMILY MEDICINE

## 2025-02-02 PROCEDURE — 2580000003 HC RX 258: Performed by: FAMILY MEDICINE

## 2025-02-02 PROCEDURE — 82962 GLUCOSE BLOOD TEST: CPT

## 2025-02-02 PROCEDURE — 6360000002 HC RX W HCPCS: Performed by: FAMILY MEDICINE

## 2025-02-02 PROCEDURE — 83540 ASSAY OF IRON: CPT

## 2025-02-02 PROCEDURE — 82728 ASSAY OF FERRITIN: CPT

## 2025-02-02 PROCEDURE — 83550 IRON BINDING TEST: CPT

## 2025-02-02 PROCEDURE — 2500000003 HC RX 250 WO HCPCS: Performed by: FAMILY MEDICINE

## 2025-02-02 PROCEDURE — 99222 1ST HOSP IP/OBS MODERATE 55: CPT | Performed by: STUDENT IN AN ORGANIZED HEALTH CARE EDUCATION/TRAINING PROGRAM

## 2025-02-02 PROCEDURE — 6370000000 HC RX 637 (ALT 250 FOR IP): Performed by: STUDENT IN AN ORGANIZED HEALTH CARE EDUCATION/TRAINING PROGRAM

## 2025-02-02 RX ORDER — POTASSIUM CHLORIDE 7.45 MG/ML
10 INJECTION INTRAVENOUS PRN
Status: DISCONTINUED | OUTPATIENT
Start: 2025-02-02 | End: 2025-02-06 | Stop reason: HOSPADM

## 2025-02-02 RX ORDER — TAMSULOSIN HYDROCHLORIDE 0.4 MG/1
0.4 CAPSULE ORAL DAILY
COMMUNITY
Start: 2024-12-19

## 2025-02-02 RX ORDER — GLUCAGON 1 MG/ML
1 KIT INJECTION PRN
Status: DISCONTINUED | OUTPATIENT
Start: 2025-02-02 | End: 2025-02-06 | Stop reason: HOSPADM

## 2025-02-02 RX ORDER — INSULIN LISPRO 100 [IU]/ML
0-4 INJECTION, SOLUTION INTRAVENOUS; SUBCUTANEOUS
Status: DISCONTINUED | OUTPATIENT
Start: 2025-02-02 | End: 2025-02-06 | Stop reason: HOSPADM

## 2025-02-02 RX ORDER — POLYETHYLENE GLYCOL 3350 17 G/17G
17 POWDER, FOR SOLUTION ORAL 2 TIMES DAILY
Status: DISCONTINUED | OUTPATIENT
Start: 2025-02-02 | End: 2025-02-06 | Stop reason: HOSPADM

## 2025-02-02 RX ORDER — ONDANSETRON 2 MG/ML
4 INJECTION INTRAMUSCULAR; INTRAVENOUS EVERY 6 HOURS PRN
Status: DISCONTINUED | OUTPATIENT
Start: 2025-02-02 | End: 2025-02-06 | Stop reason: HOSPADM

## 2025-02-02 RX ORDER — ASPIRIN 81 MG/1
81 TABLET ORAL 2 TIMES DAILY
Status: DISCONTINUED | OUTPATIENT
Start: 2025-02-02 | End: 2025-02-03

## 2025-02-02 RX ORDER — POLYETHYLENE GLYCOL 3350 17 G/17G
17 POWDER, FOR SOLUTION ORAL DAILY PRN
Status: DISCONTINUED | OUTPATIENT
Start: 2025-02-02 | End: 2025-02-02

## 2025-02-02 RX ORDER — SODIUM CHLORIDE 0.9 % (FLUSH) 0.9 %
5-40 SYRINGE (ML) INJECTION EVERY 12 HOURS SCHEDULED
Status: DISCONTINUED | OUTPATIENT
Start: 2025-02-02 | End: 2025-02-06 | Stop reason: HOSPADM

## 2025-02-02 RX ORDER — LACTULOSE 10 G/15ML
20 SOLUTION ORAL 3 TIMES DAILY
Status: DISCONTINUED | OUTPATIENT
Start: 2025-02-02 | End: 2025-02-06 | Stop reason: HOSPADM

## 2025-02-02 RX ORDER — FUROSEMIDE 40 MG/1
40 TABLET ORAL DAILY
COMMUNITY
Start: 2024-12-06

## 2025-02-02 RX ORDER — METOPROLOL TARTRATE 25 MG/1
25 TABLET, FILM COATED ORAL 2 TIMES DAILY
Status: DISCONTINUED | OUTPATIENT
Start: 2025-02-02 | End: 2025-02-06 | Stop reason: HOSPADM

## 2025-02-02 RX ORDER — POTASSIUM CHLORIDE 1500 MG/1
40 TABLET, EXTENDED RELEASE ORAL PRN
Status: DISCONTINUED | OUTPATIENT
Start: 2025-02-02 | End: 2025-02-06 | Stop reason: HOSPADM

## 2025-02-02 RX ORDER — SENNA AND DOCUSATE SODIUM 50; 8.6 MG/1; MG/1
2 TABLET, FILM COATED ORAL 2 TIMES DAILY
Status: DISCONTINUED | OUTPATIENT
Start: 2025-02-02 | End: 2025-02-06 | Stop reason: HOSPADM

## 2025-02-02 RX ORDER — GLIPIZIDE 5 MG/1
5 TABLET ORAL DAILY
Status: DISCONTINUED | OUTPATIENT
Start: 2025-02-02 | End: 2025-02-06 | Stop reason: HOSPADM

## 2025-02-02 RX ORDER — SODIUM CHLORIDE 0.9 % (FLUSH) 0.9 %
5-40 SYRINGE (ML) INJECTION PRN
Status: DISCONTINUED | OUTPATIENT
Start: 2025-02-02 | End: 2025-02-06 | Stop reason: HOSPADM

## 2025-02-02 RX ORDER — SODIUM CHLORIDE 9 MG/ML
INJECTION, SOLUTION INTRAVENOUS PRN
Status: DISCONTINUED | OUTPATIENT
Start: 2025-02-02 | End: 2025-02-06 | Stop reason: HOSPADM

## 2025-02-02 RX ORDER — MAGNESIUM SULFATE IN WATER 40 MG/ML
2000 INJECTION, SOLUTION INTRAVENOUS PRN
Status: DISCONTINUED | OUTPATIENT
Start: 2025-02-02 | End: 2025-02-06 | Stop reason: HOSPADM

## 2025-02-02 RX ORDER — LACTOBACILLUS RHAMNOSUS GG 10B CELL
2 CAPSULE ORAL DAILY
COMMUNITY

## 2025-02-02 RX ORDER — DEXTROSE MONOHYDRATE 100 MG/ML
INJECTION, SOLUTION INTRAVENOUS CONTINUOUS PRN
Status: DISCONTINUED | OUTPATIENT
Start: 2025-02-02 | End: 2025-02-06 | Stop reason: HOSPADM

## 2025-02-02 RX ORDER — LACTOBACILLUS RHAMNOSUS GG 10B CELL
2 CAPSULE ORAL DAILY
Status: DISCONTINUED | OUTPATIENT
Start: 2025-02-02 | End: 2025-02-06 | Stop reason: HOSPADM

## 2025-02-02 RX ORDER — ACETAMINOPHEN 325 MG/1
650 TABLET ORAL EVERY 6 HOURS PRN
Status: DISCONTINUED | OUTPATIENT
Start: 2025-02-02 | End: 2025-02-06 | Stop reason: HOSPADM

## 2025-02-02 RX ORDER — ONDANSETRON 4 MG/1
4 TABLET, ORALLY DISINTEGRATING ORAL EVERY 8 HOURS PRN
Status: DISCONTINUED | OUTPATIENT
Start: 2025-02-02 | End: 2025-02-06 | Stop reason: HOSPADM

## 2025-02-02 RX ORDER — ACETAMINOPHEN 650 MG/1
650 SUPPOSITORY RECTAL EVERY 6 HOURS PRN
Status: DISCONTINUED | OUTPATIENT
Start: 2025-02-02 | End: 2025-02-06 | Stop reason: HOSPADM

## 2025-02-02 RX ORDER — FUROSEMIDE 40 MG/1
40 TABLET ORAL DAILY
Status: DISCONTINUED | OUTPATIENT
Start: 2025-02-02 | End: 2025-02-06 | Stop reason: HOSPADM

## 2025-02-02 RX ORDER — TAMSULOSIN HYDROCHLORIDE 0.4 MG/1
0.4 CAPSULE ORAL DAILY
Status: DISCONTINUED | OUTPATIENT
Start: 2025-02-02 | End: 2025-02-06 | Stop reason: HOSPADM

## 2025-02-02 RX ORDER — SENNA AND DOCUSATE SODIUM 50; 8.6 MG/1; MG/1
2 TABLET, FILM COATED ORAL DAILY
Status: DISCONTINUED | OUTPATIENT
Start: 2025-02-02 | End: 2025-02-02

## 2025-02-02 RX ORDER — BISACODYL 5 MG/1
10 TABLET, DELAYED RELEASE ORAL 2 TIMES DAILY
Status: DISCONTINUED | OUTPATIENT
Start: 2025-02-02 | End: 2025-02-06 | Stop reason: HOSPADM

## 2025-02-02 RX ADMIN — SODIUM CHLORIDE, PRESERVATIVE FREE 10 ML: 5 INJECTION INTRAVENOUS at 21:15

## 2025-02-02 RX ADMIN — GLIPIZIDE 5 MG: 5 TABLET ORAL at 08:01

## 2025-02-02 RX ADMIN — PANTOPRAZOLE SODIUM 40 MG: 40 INJECTION, POWDER, FOR SOLUTION INTRAVENOUS at 17:21

## 2025-02-02 RX ADMIN — TAMSULOSIN HYDROCHLORIDE 0.4 MG: 0.4 CAPSULE ORAL at 08:02

## 2025-02-02 RX ADMIN — SODIUM CHLORIDE, PRESERVATIVE FREE 10 ML: 5 INJECTION INTRAVENOUS at 08:03

## 2025-02-02 RX ADMIN — POLYETHYLENE GLYCOL 3350 17 G: 17 POWDER, FOR SOLUTION ORAL at 08:02

## 2025-02-02 RX ADMIN — SENNOSIDES AND DOCUSATE SODIUM 2 TABLET: 50; 8.6 TABLET ORAL at 21:13

## 2025-02-02 RX ADMIN — METOPROLOL TARTRATE 25 MG: 25 TABLET, FILM COATED ORAL at 08:02

## 2025-02-02 RX ADMIN — LACTULOSE 20 G: 20 SOLUTION ORAL at 13:33

## 2025-02-02 RX ADMIN — LACTULOSE 20 G: 20 SOLUTION ORAL at 21:12

## 2025-02-02 RX ADMIN — BISACODYL 10 MG: 5 TABLET, COATED ORAL at 21:13

## 2025-02-02 RX ADMIN — SODIUM ZIRCONIUM CYCLOSILICATE 10 G: 10 POWDER, FOR SUSPENSION ORAL at 08:05

## 2025-02-02 RX ADMIN — METOPROLOL TARTRATE 25 MG: 25 TABLET, FILM COATED ORAL at 21:14

## 2025-02-02 RX ADMIN — MICONAZOLE NITRATE: 20.6 POWDER TOPICAL at 21:14

## 2025-02-02 RX ADMIN — Medication 2 CAPSULE: at 17:20

## 2025-02-02 RX ADMIN — ACETAMINOPHEN 650 MG: 325 TABLET ORAL at 21:14

## 2025-02-02 RX ADMIN — PANTOPRAZOLE SODIUM 40 MG: 40 INJECTION, POWDER, FOR SOLUTION INTRAVENOUS at 06:14

## 2025-02-02 RX ADMIN — MICONAZOLE NITRATE: 20.6 POWDER TOPICAL at 08:02

## 2025-02-02 RX ADMIN — POLYETHYLENE GLYCOL 3350 17 G: 17 POWDER, FOR SOLUTION ORAL at 21:14

## 2025-02-02 RX ADMIN — SENNOSIDES AND DOCUSATE SODIUM 2 TABLET: 50; 8.6 TABLET ORAL at 08:02

## 2025-02-02 RX ADMIN — FUROSEMIDE 40 MG: 40 TABLET ORAL at 08:01

## 2025-02-02 ASSESSMENT — PAIN SCALES - GENERAL
PAINLEVEL_OUTOF10: 5
PAINLEVEL_OUTOF10: 0

## 2025-02-02 ASSESSMENT — PAIN - FUNCTIONAL ASSESSMENT: PAIN_FUNCTIONAL_ASSESSMENT: ACTIVITIES ARE NOT PREVENTED

## 2025-02-02 ASSESSMENT — PAIN DESCRIPTION - DESCRIPTORS: DESCRIPTORS: STABBING

## 2025-02-02 ASSESSMENT — PAIN DESCRIPTION - LOCATION: LOCATION: RECTUM

## 2025-02-02 NOTE — PLAN OF CARE
Problem: Chronic Conditions and Co-morbidities  Goal: Patient's chronic conditions and co-morbidity symptoms are monitored and maintained or improved  Outcome: Progressing     Problem: Discharge Planning  Goal: Discharge to home or other facility with appropriate resources  Outcome: Progressing     Problem: Skin/Tissue Integrity  Goal: Skin integrity remains intact  Description: 1.  Monitor for areas of redness and/or skin breakdown  2.  Assess vascular access sites hourly  3.  Every 4-6 hours minimum:  Change oxygen saturation probe site  4.  Every 4-6 hours:  If on nasal continuous positive airway pressure, respiratory therapy assess nares and determine need for appliance change or resting period  Outcome: Progressing     Problem: Safety - Adult  Goal: Free from fall injury  Outcome: Progressing     Problem: ABCDS Injury Assessment  Goal: Absence of physical injury  Outcome: Progressing

## 2025-02-02 NOTE — ED PROVIDER NOTES
Shared LUISA-ED Attending Visit.  CC: No        [facility] IMCU[facility]NEURO  EMERGENCY DEPARTMENT ENCOUNTER        Pt Name: Daniel Mcdonald  MRN: [id number]  Birthdate 1955  Date of evaluation: 2/1/2025  Provider: JAMIE Pierce - TIFFANIE  PCP: Geoff Kauffman DO  Note Started: 11:11 PM EST 2/1/25       I have seen and evaluated this patient with my supervising physician Cristhian Denney DO.      CHIEF COMPLAINT       Chief Complaint   Patient presents with    Constipation     From [address]. His room number is 313. Facility did not call, per [facility], they gave me the address to the facility. When [facility] called, they stated that patient was tired of waiting for them to give him an enema. He had miralax with no results so far.       HISTORY OF PRESENT ILLNESS: 1 or more Elements     History from : Patient    Limitations to history : None    Daniel Mcdonald is a 69 y.o. male who presents to the emergency department from [facility].  He is having constipation and diffuse abdominal pain.  He states he has had some occasional bright red blood in his stool when he assumed it was from constipation and straining to have a bowel movement.  He is on Coumadin. He has been constipated for the last day or so.  He is currently on narcotics.  He has a walking boot on left leg.  He had a previous trimalleolar fracture of left ankle.  He is currently in rehabilitation.  He is morbidly obese.  Denying chest pain and shortness of breath.  He did take a dose of MiraLAX few hours prior to arrival.  He was asking for an enema at facility but they would not give it to him.  He was sent here for further evaluation.  On my assessment, patient is in no acute distress, nontoxic-appearing, respirations are easy and unlabored.  GCS is 15.  Moving all extremities without issue.  He is able to turn side-to-side in bed with assistance.    Nursing Notes were all reviewed and agreed with or

## 2025-02-02 NOTE — PLAN OF CARE
Problem: Chronic Conditions and Co-morbidities  Goal: Patient's chronic conditions and co-morbidity symptoms are monitored and maintained or improved  2/2/2025 1548 by Kashmir Schuler RN  Outcome: Progressing     Problem: Discharge Planning  Goal: Discharge to home or other facility with appropriate resources  2/2/2025 1548 by Kashmir Schuler RN  Outcome: Progressing     Problem: Skin/Tissue Integrity  Goal: Skin integrity remains intact  Description: 1.  Monitor for areas of redness and/or skin breakdown  2.  Assess vascular access sites hourly  3.  Every 4-6 hours minimum:  Change oxygen saturation probe site  4.  Every 4-6 hours:  If on nasal continuous positive airway pressure, respiratory therapy assess nares and determine need for appliance change or resting period  2/2/2025 1548 by Kashmir Schuler RN  Outcome: Progressing     Problem: Safety - Adult  Goal: Free from fall injury  2/2/2025 1548 by Kashmir Schuler RN  Outcome: Progressing     Problem: ABCDS Injury Assessment  Goal: Absence of physical injury  2/2/2025 1548 by Kashmir Schuler RN  Outcome: Progressing     Problem: Pain  Goal: Verbalizes/displays adequate comfort level or baseline comfort level  Outcome: Progressing

## 2025-02-02 NOTE — H&P
Hospital Medicine History & Physical      PCP: Geoff Kauffman DO    Date of Admission: 2/1/2025    Date of Service: Pt seen/examined on 2/2/25 and Admitted to Inpatient with expected LOS greater than two midnights due to medical therapy.      Chief Complaint:  anemia, constipation      History Of Present Illness:      69 y.o. male who presented to [facility] with abdominal pain and constipation to [place] [facility].  Patient is on narcotics as needed as he had a recent trimalleolar fracture of the left ankle and currently in rehab.  Patient was asking for enema at the rehab as MiraLAX did not help him.  Given this he was sent to the ER.  CT abdomen showed moderate to severe constipation, no acute i intra-abdominal abnormal.  Of note on lab work was found to be anemic hemoglobin of 9.9 compared to normal hemoglobin of 15.4 on 10/16/2024 trended downward to 11.9 on 10/17/2024.  Also MCV of 67.1 concerning for a deficiency anemia.Patient denies any previous known history of iron deficiency anemia.  He does mention has had some blood in his stools but thinks from being constipated and straining.    Past Medical History:          Diagnosis Date    Arthritis     Hips, possible shoulders and knees    CAD (coronary artery disease)     Cellulitis 11/2016    right leg    Diabetes mellitus (HCC)     Hyperlipidemia     Hypertension     Kidney failure     Morbid obesity with body mass index (BMI) of 60.0 to 69.9 in adult     Pulmonary embolism (HCC)     Sleep apnea        Past Surgical History:          Procedure Laterality Date    ANKLE SURGERY Left 10/16/2024    Left Ankle Irrigation and Debridement with Placement of Left Ankle Spanning External Fixator performed by Jose Morrissey DO [facility]    CARDIAC SURGERY  05/13/2019    ACB x 5 at [facility]    COLONOSCOPY  01/24/2006    ENDOSCOPY, COLON, DIAGNOSTIC      THROAT SURGERY      TONSILLECTOMY      WISDOM TOOTH EXTRACTION         Medications

## 2025-02-02 NOTE — CONSULTS
GENERAL SURGERY  CONSULT NOTE  2/2/2025    Physician Consulted: Dr. Wallace  Reason for Consult: Anemia  Referring Physician: Dr. Zoltan LANDRUM  Daniel Mcdonald is a 69 y.o. male with PMH CAD, prior PE on Coumadin, HTN, morbid obesity, who presented to the ED with constipation. Patient recently had trimalleolar fracture of L ankle and was in rehab. He has been having difficulty having bowel movements and states nursing has noticed bright red blood on the toilet paper after having tough bowel movements. Denies any abdominal pain, nausea, or vomiting. Denies any dark or tarry stools otherwise. Denies any reflux symptoms. Denies prior history of EGD. Had a colonoscopy many years ago at [facility] which was normal.     Patient afebrile, hemodynamically stable on arrival. Hgb 9.9 from 11.9 in October.     Past Medical History:   Diagnosis Date    Arthritis     Hips, possible shoulders and knees    CAD (coronary artery disease)     Cellulitis 11/2016    right leg    Diabetes mellitus (HCC)     Hyperlipidemia     Hypertension     Kidney failure     Morbid obesity with body mass index (BMI) of 60.0 to 69.9 in adult     Pulmonary embolism (HCC)     Sleep apnea        Past Surgical History:   Procedure Laterality Date    ANKLE SURGERY Left 10/16/2024    Left Ankle Irrigation and Debridement with Placement of Left Ankle Spanning External Fixator performed by Jose Morrissey DO at [facility]    CARDIAC SURGERY  05/13/2019    ACB x 5 at [facility]    COLONOSCOPY  01/24/2006    ENDOSCOPY, COLON, DIAGNOSTIC      THROAT SURGERY      TONSILLECTOMY      WISDOM TOOTH EXTRACTION         Medications Prior to Admission:    Prior to Admission medications    Medication Sig Start Date End Date Taking? Authorizing Provider   furosemide (LASIX) 40 MG tablet Take 1 tablet by mouth daily 12/6/24  Yes Leanne Dong MD   tamsulosin (FLOMAX) 0.4 MG capsule Take 1 capsule by mouth daily 12/19/24  Yes Leanne Dong MD

## 2025-02-03 PROBLEM — K59.03 DRUG-INDUCED CONSTIPATION: Status: ACTIVE | Noted: 2025-02-03

## 2025-02-03 PROBLEM — K62.5 BRBPR (BRIGHT RED BLOOD PER RECTUM): Status: ACTIVE | Noted: 2025-02-03

## 2025-02-03 LAB
ANION GAP SERPL CALCULATED.3IONS-SCNC: 10 MMOL/L (ref 7–16)
BASOPHILS # BLD: 0.06 K/UL (ref 0–0.2)
BASOPHILS NFR BLD: 1 % (ref 0–2)
BUN SERPL-MCNC: 48 MG/DL (ref 6–23)
CALCIUM SERPL-MCNC: 9.2 MG/DL (ref 8.6–10.2)
CHLORIDE SERPL-SCNC: 105 MMOL/L (ref 98–107)
CO2 SERPL-SCNC: 30 MMOL/L (ref 22–29)
CREAT SERPL-MCNC: 1.3 MG/DL (ref 0.7–1.2)
EOSINOPHIL # BLD: 0.19 K/UL (ref 0.05–0.5)
EOSINOPHILS RELATIVE PERCENT: 2 % (ref 0–6)
ERYTHROCYTE [DISTWIDTH] IN BLOOD BY AUTOMATED COUNT: 19 % (ref 11.5–15)
GFR, ESTIMATED: 57 ML/MIN/1.73M2
GLUCOSE BLD-MCNC: 101 MG/DL (ref 74–99)
GLUCOSE BLD-MCNC: 112 MG/DL (ref 74–99)
GLUCOSE BLD-MCNC: 142 MG/DL (ref 74–99)
GLUCOSE BLD-MCNC: 85 MG/DL (ref 74–99)
GLUCOSE SERPL-MCNC: 85 MG/DL (ref 74–99)
HCT VFR BLD AUTO: 34.4 % (ref 37–54)
HGB BLD-MCNC: 9.7 G/DL (ref 12.5–16.5)
IMM GRANULOCYTES # BLD AUTO: <0.03 K/UL (ref 0–0.58)
IMM GRANULOCYTES NFR BLD: 0 % (ref 0–5)
LYMPHOCYTES NFR BLD: 1.65 K/UL (ref 1.5–4)
LYMPHOCYTES RELATIVE PERCENT: 19 % (ref 20–42)
MCH RBC QN AUTO: 19.1 PG (ref 26–35)
MCHC RBC AUTO-ENTMCNC: 28.2 G/DL (ref 32–34.5)
MCV RBC AUTO: 67.7 FL (ref 80–99.9)
MONOCYTES NFR BLD: 0.67 K/UL (ref 0.1–0.95)
MONOCYTES NFR BLD: 8 % (ref 2–12)
NEUTROPHILS NFR BLD: 71 % (ref 43–80)
NEUTS SEG NFR BLD: 6.25 K/UL (ref 1.8–7.3)
PLATELET, FLUORESCENCE: 325 K/UL (ref 130–450)
PMV BLD AUTO: ABNORMAL FL (ref 7–12)
POTASSIUM SERPL-SCNC: 4.2 MMOL/L (ref 3.5–5)
RBC # BLD AUTO: 5.08 M/UL (ref 3.8–5.8)
SODIUM SERPL-SCNC: 145 MMOL/L (ref 132–146)
WBC OTHER # BLD: 8.8 K/UL (ref 4.5–11.5)

## 2025-02-03 PROCEDURE — 82962 GLUCOSE BLOOD TEST: CPT

## 2025-02-03 PROCEDURE — 6370000000 HC RX 637 (ALT 250 FOR IP): Performed by: STUDENT IN AN ORGANIZED HEALTH CARE EDUCATION/TRAINING PROGRAM

## 2025-02-03 PROCEDURE — 99232 SBSQ HOSP IP/OBS MODERATE 35: CPT | Performed by: SURGERY

## 2025-02-03 PROCEDURE — 2500000003 HC RX 250 WO HCPCS: Performed by: FAMILY MEDICINE

## 2025-02-03 PROCEDURE — 2580000003 HC RX 258: Performed by: FAMILY MEDICINE

## 2025-02-03 PROCEDURE — 6370000000 HC RX 637 (ALT 250 FOR IP): Performed by: FAMILY MEDICINE

## 2025-02-03 PROCEDURE — 36415 COLL VENOUS BLD VENIPUNCTURE: CPT

## 2025-02-03 PROCEDURE — 80048 BASIC METABOLIC PNL TOTAL CA: CPT

## 2025-02-03 PROCEDURE — 85025 COMPLETE CBC W/AUTO DIFF WBC: CPT

## 2025-02-03 PROCEDURE — 1200000000 HC SEMI PRIVATE

## 2025-02-03 PROCEDURE — 6360000002 HC RX W HCPCS: Performed by: FAMILY MEDICINE

## 2025-02-03 RX ORDER — WARFARIN SODIUM 4 MG/1
4 TABLET ORAL
Status: COMPLETED | OUTPATIENT
Start: 2025-02-03 | End: 2025-02-03

## 2025-02-03 RX ORDER — ASPIRIN 81 MG/1
81 TABLET ORAL DAILY
Status: DISCONTINUED | OUTPATIENT
Start: 2025-02-04 | End: 2025-02-06 | Stop reason: HOSPADM

## 2025-02-03 RX ORDER — WARFARIN SODIUM 2.5 MG/1
2.5 TABLET ORAL
Status: DISCONTINUED | OUTPATIENT
Start: 2025-02-03 | End: 2025-02-03

## 2025-02-03 RX ORDER — WARFARIN SODIUM 4 MG/1
4 TABLET ORAL DAILY
COMMUNITY

## 2025-02-03 RX ORDER — WARFARIN SODIUM 1 MG/1
3.5 TABLET ORAL DAILY
COMMUNITY

## 2025-02-03 RX ORDER — BISACODYL 10 MG
10 SUPPOSITORY, RECTAL RECTAL DAILY
Status: DISCONTINUED | OUTPATIENT
Start: 2025-02-03 | End: 2025-02-06 | Stop reason: HOSPADM

## 2025-02-03 RX ADMIN — TAMSULOSIN HYDROCHLORIDE 0.4 MG: 0.4 CAPSULE ORAL at 08:45

## 2025-02-03 RX ADMIN — PANTOPRAZOLE SODIUM 40 MG: 40 INJECTION, POWDER, FOR SOLUTION INTRAVENOUS at 05:51

## 2025-02-03 RX ADMIN — METOPROLOL TARTRATE 25 MG: 25 TABLET, FILM COATED ORAL at 08:45

## 2025-02-03 RX ADMIN — LACTULOSE 20 G: 20 SOLUTION ORAL at 08:45

## 2025-02-03 RX ADMIN — MICONAZOLE NITRATE: 20.6 POWDER TOPICAL at 08:51

## 2025-02-03 RX ADMIN — METOPROLOL TARTRATE 25 MG: 25 TABLET, FILM COATED ORAL at 21:09

## 2025-02-03 RX ADMIN — SODIUM CHLORIDE, PRESERVATIVE FREE 10 ML: 5 INJECTION INTRAVENOUS at 23:01

## 2025-02-03 RX ADMIN — BISACODYL 10 MG: 5 TABLET, COATED ORAL at 08:45

## 2025-02-03 RX ADMIN — POLYETHYLENE GLYCOL 3350 17 G: 17 POWDER, FOR SOLUTION ORAL at 08:45

## 2025-02-03 RX ADMIN — Medication 2 CAPSULE: at 08:45

## 2025-02-03 RX ADMIN — FUROSEMIDE 40 MG: 40 TABLET ORAL at 08:45

## 2025-02-03 RX ADMIN — GLIPIZIDE 5 MG: 5 TABLET ORAL at 08:45

## 2025-02-03 RX ADMIN — SODIUM CHLORIDE, PRESERVATIVE FREE 10 ML: 5 INJECTION INTRAVENOUS at 08:46

## 2025-02-03 RX ADMIN — SENNOSIDES AND DOCUSATE SODIUM 2 TABLET: 50; 8.6 TABLET ORAL at 08:45

## 2025-02-03 RX ADMIN — MICONAZOLE NITRATE: 20.6 POWDER TOPICAL at 23:14

## 2025-02-03 RX ADMIN — WARFARIN SODIUM 4 MG: 4 TABLET ORAL at 23:13

## 2025-02-03 ASSESSMENT — PAIN SCALES - GENERAL: PAINLEVEL_OUTOF10: 0

## 2025-02-03 NOTE — PLAN OF CARE
Problem: Chronic Conditions and Co-morbidities  Goal: Patient's chronic conditions and co-morbidity symptoms are monitored and maintained or improved  2/2/2025 1936 by Tavia Brunner RN  Outcome: Progressing  2/2/2025 1548 by Kashmir Schuler RN  Outcome: Progressing     Problem: Discharge Planning  Goal: Discharge to home or other facility with appropriate resources  2/2/2025 1936 by Tavia Brunner RN  Outcome: Progressing  2/2/2025 1548 by Kashmir Schuler RN  Outcome: Progressing     Problem: Skin/Tissue Integrity  Goal: Skin integrity remains intact  Description: 1.  Monitor for areas of redness and/or skin breakdown  2.  Assess vascular access sites hourly  3.  Every 4-6 hours minimum:  Change oxygen saturation probe site  4.  Every 4-6 hours:  If on nasal continuous positive airway pressure, respiratory therapy assess nares and determine need for appliance change or resting period  2/2/2025 1936 by Tavia Brunner RN  Outcome: Progressing  Flowsheets (Taken 2/2/2025 1930)  Skin Integrity Remains Intact: Monitor for areas of redness and/or skin breakdown  2/2/2025 1548 by Kashmir Schuler RN  Outcome: Progressing     Problem: Safety - Adult  Goal: Free from fall injury  2/2/2025 1936 by Tavia Brunner RN  Outcome: Progressing  Flowsheets (Taken 2/2/2025 1930)  Free From Fall Injury: Instruct family/caregiver on patient safety  2/2/2025 1548 by Kashmir Schuler RN  Outcome: Progressing     Problem: ABCDS Injury Assessment  Goal: Absence of physical injury  2/2/2025 1936 by Tavia Brunner RN  Outcome: Progressing  Flowsheets (Taken 2/2/2025 1930)  Absence of Physical Injury: Implement safety measures based on patient assessment  2/2/2025 1548 by Kashmir Schuler RN  Outcome: Progressing     Problem: Pain  Goal: Verbalizes/displays adequate comfort level or baseline comfort level  2/2/2025 1936 by Tavia Brunner RN  Outcome: Progressing  2/2/2025 1548 by Kashmir Schuler RN  Outcome:

## 2025-02-03 NOTE — ACP (ADVANCE CARE PLANNING)
Advance Care Planning   Healthcare Decision Maker:    Primary Decision Maker: RandellradhaMichaelle holliday - [facility] - [phone]    Click here to complete Healthcare Decision Makers including selection of the Healthcare Decision Maker Relationship (ie \"Primary\").          offered and declined advance directives. SUGAR [place]  2/3/2025

## 2025-02-04 LAB
EKG ATRIAL RATE: 78 BPM
EKG P AXIS: 48 DEGREES
EKG P-R INTERVAL: 180 MS
EKG Q-T INTERVAL: 372 MS
EKG QRS DURATION: 82 MS
EKG QTC CALCULATION (BAZETT): 424 MS
EKG R AXIS: 58 DEGREES
EKG T AXIS: 57 DEGREES
EKG VENTRICULAR RATE: 78 BPM
ERYTHROCYTE [DISTWIDTH] IN BLOOD BY AUTOMATED COUNT: 19 % (ref 11.5–15)
GLUCOSE BLD-MCNC: 119 MG/DL (ref 74–99)
GLUCOSE BLD-MCNC: 120 MG/DL (ref 74–99)
GLUCOSE BLD-MCNC: 130 MG/DL (ref 74–99)
GLUCOSE BLD-MCNC: 171 MG/DL (ref 74–99)
HCT VFR BLD AUTO: 32.1 % (ref 37–54)
HGB BLD-MCNC: 8.8 G/DL (ref 12.5–16.5)
INR PPP: 1.7
MCH RBC QN AUTO: 18.9 PG (ref 26–35)
MCHC RBC AUTO-ENTMCNC: 27.4 G/DL (ref 32–34.5)
MCV RBC AUTO: 68.9 FL (ref 80–99.9)
PLATELET, FLUORESCENCE: 298 K/UL (ref 130–450)
PMV BLD AUTO: ABNORMAL FL (ref 7–12)
PROTHROMBIN TIME: 18.2 SEC (ref 9.3–12.4)
RBC # BLD AUTO: 4.66 M/UL (ref 3.8–5.8)
WBC OTHER # BLD: 7.9 K/UL (ref 4.5–11.5)

## 2025-02-04 PROCEDURE — 6370000000 HC RX 637 (ALT 250 FOR IP): Performed by: SURGERY

## 2025-02-04 PROCEDURE — 1200000000 HC SEMI PRIVATE

## 2025-02-04 PROCEDURE — 97530 THERAPEUTIC ACTIVITIES: CPT

## 2025-02-04 PROCEDURE — 2500000003 HC RX 250 WO HCPCS: Performed by: FAMILY MEDICINE

## 2025-02-04 PROCEDURE — 97535 SELF CARE MNGMENT TRAINING: CPT

## 2025-02-04 PROCEDURE — 85027 COMPLETE CBC AUTOMATED: CPT

## 2025-02-04 PROCEDURE — 2580000003 HC RX 258: Performed by: FAMILY MEDICINE

## 2025-02-04 PROCEDURE — 97161 PT EVAL LOW COMPLEX 20 MIN: CPT

## 2025-02-04 PROCEDURE — 82962 GLUCOSE BLOOD TEST: CPT

## 2025-02-04 PROCEDURE — 6370000000 HC RX 637 (ALT 250 FOR IP): Performed by: STUDENT IN AN ORGANIZED HEALTH CARE EDUCATION/TRAINING PROGRAM

## 2025-02-04 PROCEDURE — 85610 PROTHROMBIN TIME: CPT

## 2025-02-04 PROCEDURE — 6370000000 HC RX 637 (ALT 250 FOR IP): Performed by: FAMILY MEDICINE

## 2025-02-04 PROCEDURE — 97165 OT EVAL LOW COMPLEX 30 MIN: CPT

## 2025-02-04 PROCEDURE — 6360000002 HC RX W HCPCS: Performed by: FAMILY MEDICINE

## 2025-02-04 PROCEDURE — 36415 COLL VENOUS BLD VENIPUNCTURE: CPT

## 2025-02-04 PROCEDURE — 99232 SBSQ HOSP IP/OBS MODERATE 35: CPT | Performed by: STUDENT IN AN ORGANIZED HEALTH CARE EDUCATION/TRAINING PROGRAM

## 2025-02-04 PROCEDURE — 93010 ELECTROCARDIOGRAM REPORT: CPT | Performed by: INTERNAL MEDICINE

## 2025-02-04 RX ORDER — WARFARIN SODIUM 4 MG/1
4 TABLET ORAL
Status: COMPLETED | OUTPATIENT
Start: 2025-02-04 | End: 2025-02-04

## 2025-02-04 RX ADMIN — SODIUM CHLORIDE, PRESERVATIVE FREE 10 ML: 5 INJECTION INTRAVENOUS at 21:10

## 2025-02-04 RX ADMIN — MICONAZOLE NITRATE: 20.6 POWDER TOPICAL at 21:11

## 2025-02-04 RX ADMIN — Medication 2 CAPSULE: at 09:26

## 2025-02-04 RX ADMIN — NALOXEGOL OXALATE 12.5 MG: 12.5 TABLET, FILM COATED ORAL at 06:13

## 2025-02-04 RX ADMIN — MICONAZOLE NITRATE: 20.6 POWDER TOPICAL at 09:25

## 2025-02-04 RX ADMIN — TAMSULOSIN HYDROCHLORIDE 0.4 MG: 0.4 CAPSULE ORAL at 09:26

## 2025-02-04 RX ADMIN — FUROSEMIDE 40 MG: 40 TABLET ORAL at 09:26

## 2025-02-04 RX ADMIN — METOPROLOL TARTRATE 25 MG: 25 TABLET, FILM COATED ORAL at 21:09

## 2025-02-04 RX ADMIN — GLIPIZIDE 5 MG: 5 TABLET ORAL at 09:26

## 2025-02-04 RX ADMIN — WARFARIN SODIUM 4 MG: 4 TABLET ORAL at 16:59

## 2025-02-04 RX ADMIN — SODIUM CHLORIDE, PRESERVATIVE FREE 10 ML: 5 INJECTION INTRAVENOUS at 09:28

## 2025-02-04 RX ADMIN — PANTOPRAZOLE SODIUM 40 MG: 40 INJECTION, POWDER, FOR SOLUTION INTRAVENOUS at 16:58

## 2025-02-04 RX ADMIN — PANTOPRAZOLE SODIUM 40 MG: 40 INJECTION, POWDER, FOR SOLUTION INTRAVENOUS at 06:14

## 2025-02-04 RX ADMIN — ASPIRIN 81 MG: 81 TABLET, COATED ORAL at 09:26

## 2025-02-04 ASSESSMENT — PAIN SCALES - GENERAL
PAINLEVEL_OUTOF10: 0
PAINLEVEL_OUTOF10: 0

## 2025-02-05 VITALS
DIASTOLIC BLOOD PRESSURE: 52 MMHG | SYSTOLIC BLOOD PRESSURE: 147 MMHG | HEART RATE: 72 BPM | BODY MASS INDEX: 45.1 KG/M2 | OXYGEN SATURATION: 97 % | HEIGHT: 70 IN | WEIGHT: 315 LBS | RESPIRATION RATE: 18 BRPM | TEMPERATURE: 97.3 F

## 2025-02-05 LAB
ERYTHROCYTE [DISTWIDTH] IN BLOOD BY AUTOMATED COUNT: 19.3 % (ref 11.5–15)
GLUCOSE BLD-MCNC: 107 MG/DL (ref 74–99)
GLUCOSE BLD-MCNC: 172 MG/DL (ref 74–99)
GLUCOSE BLD-MCNC: 180 MG/DL (ref 74–99)
GLUCOSE BLD-MCNC: 91 MG/DL (ref 74–99)
HCT VFR BLD AUTO: 31.7 % (ref 37–54)
HGB BLD-MCNC: 8.9 G/DL (ref 12.5–16.5)
INR PPP: 2
MCH RBC QN AUTO: 19.2 PG (ref 26–35)
MCHC RBC AUTO-ENTMCNC: 28.1 G/DL (ref 32–34.5)
MCV RBC AUTO: 68.5 FL (ref 80–99.9)
PLATELET, FLUORESCENCE: 268 K/UL (ref 130–450)
PMV BLD AUTO: 10.3 FL (ref 7–12)
PROTHROMBIN TIME: 22.4 SEC (ref 9.3–12.4)
RBC # BLD AUTO: 4.63 M/UL (ref 3.8–5.8)
WBC OTHER # BLD: 7.3 K/UL (ref 4.5–11.5)

## 2025-02-05 PROCEDURE — 36415 COLL VENOUS BLD VENIPUNCTURE: CPT

## 2025-02-05 PROCEDURE — 85610 PROTHROMBIN TIME: CPT

## 2025-02-05 PROCEDURE — 2580000003 HC RX 258: Performed by: FAMILY MEDICINE

## 2025-02-05 PROCEDURE — 6370000000 HC RX 637 (ALT 250 FOR IP): Performed by: SURGERY

## 2025-02-05 PROCEDURE — 6360000002 HC RX W HCPCS: Performed by: FAMILY MEDICINE

## 2025-02-05 PROCEDURE — 2500000003 HC RX 250 WO HCPCS: Performed by: FAMILY MEDICINE

## 2025-02-05 PROCEDURE — 82962 GLUCOSE BLOOD TEST: CPT

## 2025-02-05 PROCEDURE — 85027 COMPLETE CBC AUTOMATED: CPT

## 2025-02-05 PROCEDURE — 6370000000 HC RX 637 (ALT 250 FOR IP): Performed by: FAMILY MEDICINE

## 2025-02-05 PROCEDURE — 99232 SBSQ HOSP IP/OBS MODERATE 35: CPT | Performed by: STUDENT IN AN ORGANIZED HEALTH CARE EDUCATION/TRAINING PROGRAM

## 2025-02-05 PROCEDURE — 6370000000 HC RX 637 (ALT 250 FOR IP): Performed by: STUDENT IN AN ORGANIZED HEALTH CARE EDUCATION/TRAINING PROGRAM

## 2025-02-05 RX ORDER — SENNA AND DOCUSATE SODIUM 50; 8.6 MG/1; MG/1
2 TABLET, FILM COATED ORAL 2 TIMES DAILY
Qty: 120 TABLET | Refills: 1 | Status: SHIPPED | OUTPATIENT
Start: 2025-02-05 | End: 2025-04-06

## 2025-02-05 RX ORDER — WARFARIN SODIUM 2.5 MG/1
2.5 TABLET ORAL
Status: COMPLETED | OUTPATIENT
Start: 2025-02-05 | End: 2025-02-05

## 2025-02-05 RX ADMIN — GLIPIZIDE 5 MG: 5 TABLET ORAL at 09:22

## 2025-02-05 RX ADMIN — ASPIRIN 81 MG: 81 TABLET, COATED ORAL at 09:22

## 2025-02-05 RX ADMIN — NALOXEGOL OXALATE 12.5 MG: 12.5 TABLET, FILM COATED ORAL at 06:07

## 2025-02-05 RX ADMIN — PANTOPRAZOLE SODIUM 40 MG: 40 INJECTION, POWDER, FOR SOLUTION INTRAVENOUS at 05:12

## 2025-02-05 RX ADMIN — TAMSULOSIN HYDROCHLORIDE 0.4 MG: 0.4 CAPSULE ORAL at 09:22

## 2025-02-05 RX ADMIN — WARFARIN SODIUM 2.5 MG: 2.5 TABLET ORAL at 16:58

## 2025-02-05 RX ADMIN — MICONAZOLE NITRATE: 20.6 POWDER TOPICAL at 09:25

## 2025-02-05 RX ADMIN — Medication 2 CAPSULE: at 09:23

## 2025-02-05 RX ADMIN — PANTOPRAZOLE SODIUM 40 MG: 40 INJECTION, POWDER, FOR SOLUTION INTRAVENOUS at 16:59

## 2025-02-05 RX ADMIN — METOPROLOL TARTRATE 25 MG: 25 TABLET, FILM COATED ORAL at 21:10

## 2025-02-05 RX ADMIN — SODIUM CHLORIDE, PRESERVATIVE FREE 10 ML: 5 INJECTION INTRAVENOUS at 09:25

## 2025-02-05 RX ADMIN — FUROSEMIDE 40 MG: 40 TABLET ORAL at 09:22

## 2025-02-05 RX ADMIN — METOPROLOL TARTRATE 25 MG: 25 TABLET, FILM COATED ORAL at 09:22

## 2025-02-05 ASSESSMENT — PAIN SCALES - GENERAL: PAINLEVEL_OUTOF10: 0

## 2025-02-05 NOTE — PLAN OF CARE
Problem: Chronic Conditions and Co-morbidities  Goal: Patient's chronic conditions and co-morbidity symptoms are monitored and maintained or improved  2/5/2025 0429 by Rachid Luna, RN  Outcome: Progressing  2/5/2025 0023 by Paz Baez, RN  Outcome: Progressing

## 2025-02-05 NOTE — DISCHARGE SUMMARY
HOSPITALIST PROGRESS NOTE    Patient's name: Daniel Mcdonald  : 1955  Admission date: 2025  Date of service: 2025   Primary care physician: Geoff Kauffman DO    Assessment   Patient admitted on 2025     Daniel Mcdonald is a 69 y.o. male patient of Geoff Kauffman DO with history of Recent trimalleolar fracture of left ankle currently in rehab, arthritis, CAD, diabetes, hypertension, hyperlipidemia, morbid obesity, pulmonary embolism on Eliquis presented to the hospital with concern of constipation.  Patient had CT abdomen in the ER showing moderate to severe constipation.  Patient was anemic with hemoglobin of 9.9 with baseline of around 12.  Patient was seen by surgical team and started on aggressive bowel regimen.  Patient did complain of having BRBPR which were likely hemorrhoidal in nature per surgical team.  Continue aggressive bowel regimen.     Constipation-resolved  BRBPR likely hemorrhoidal  Continue to monitor H&H, transfuse for hemoglobin less than 7  Had multiple bowel movements overnight.  H&H is stable.  Out of bed, ambulate  PT/OT    Recent trimalleolar fracture of left ankle  PT/OT, patient does not want to go back to previous rehab,  is aware and helping with discharge needs    Pulmonary embolism  CAD  Hypertension  Hyperlipidemia  Morbid obesity  Continue home medications  Lifestyle modification      Follow labs   DVT prophylaxis Coumadin  Please see orders for further management and care.  Discharge plan: Placement    Subjective  Daniel was seen and examined at bedside.  Patient is alert and oriented, no acute overnight events.  H&H has remained stable over past 24 hours.  Possible discharge once placed.  Review of Systems  All systems reviewed and negative except mentioned above.       Objective  Most Recent Recorded Vitals  BP (!) 120/53   Pulse 90   Temp 97.3 °F (36.3 °C) (Temporal)   Resp 20   Ht 1.778 m (5' 10\")   Wt (!) 152 kg (335

## 2025-02-05 NOTE — CARE COORDINATION
CM update note.  Left  message with Tamica from [facility] to check on status of the referral.      1130:  [facility] unable to accept.  Patient wants to check about [facility] as well.  [facility] and [facility] have both declined.  Met with patient.  Went over the 2 facilities that can accept him,  which are [facility] and [facility].  He declines [facility].  He wants to try [facility] again.  Referral called to [facility].      1223:  patient is over weight limit for [facility].  Patient wants a referral to [facility].  Referral called to [facility].       1405:  [facility] does not have a bed available.  Met with patient.  He wants to return to [facility].  Per [facility] they have a bed available and can accept today.  Discharge order noted.  Transport set up via stretcher with [facility].  time is between 2734-2681.  Nurse will need call report to [phone].  Ambulance form with envelope placed on the soft chart.  Facility liaison, patient, and RN notified of  time.  Darrell Barker RN [phone].     
CM update note.  Met with patient.  Informed him of all the referrals made and which ones can accept.  He requested additional referrals to [facility] 2. [facility] 3. [facility].  [facility] and [facility] unable to accept.  [facility] does not have any beds.  Received a call from [facility].  Patient spoke with the owner and they are following for [facility].  Await review and acceptance.  CM/SW to follow.  Darrell Barker RN [phone].   
Type: *No Product type* /     Does insurance require precert for SNF: No    Potential assistance Purchasing Medications:    Meds-to-Beds request: Yes      [id number]  [place], [place] - [address] - P [phone] - F [phone]  [address] [place] [address]  Phone: [phone] Fax: [phone]      Notes:    Factors facilitating achievement of predicted outcomes: Family support, Motivated, Cooperative, and Pleasant    Barriers to discharge: Lower extremity weakness    Additional Case Management Notes: see above     The Plan for Transition of Care is related to the following treatment goals of Anemia [D64.9]    IF APPLICABLE: The Patient and/or patient representative Daniel and his family were provided with a choice of provider and agrees with the discharge plan. Freedom of choice list with basic dialogue that supports the patient's individualized plan of care/goals and shares the quality data associated with the providers was provided to:     Patient Representative Name:       The Patient and/or Patient Representative Agree with the Discharge Plan?      SUGAR Guadarrama  Case Management Department  Ph: [phone] Fax: [phone]

## 2025-02-05 NOTE — PLAN OF CARE
Problem: Chronic Conditions and Co-morbidities  Goal: Patient's chronic conditions and co-morbidity symptoms are monitored and maintained or improved  2/5/2025 1441 by Ondina Cuevas RN  Outcome: Completed  2/5/2025 0429 by Rachid Luna RN  Outcome: Progressing     Problem: Discharge Planning  Goal: Discharge to home or other facility with appropriate resources  2/5/2025 1441 by Ondina Cuevas RN  Outcome: Completed  2/5/2025 0429 by Rachid Luna RN  Outcome: Progressing     Problem: Skin/Tissue Integrity  Goal: Skin integrity remains intact  Description: 1.  Monitor for areas of redness and/or skin breakdown  2.  Assess vascular access sites hourly  3.  Every 4-6 hours minimum:  Change oxygen saturation probe site  4.  Every 4-6 hours:  If on nasal continuous positive airway pressure, respiratory therapy assess nares and determine need for appliance change or resting period  2/5/2025 1441 by Ondina Cuveas RN  Outcome: Completed  2/5/2025 0429 by Rachid Luna RN  Outcome: Progressing     Problem: Safety - Adult  Goal: Free from fall injury  2/5/2025 1441 by Ondina Cuevas RN  Outcome: Completed  2/5/2025 0429 by Rachid Luna RN  Outcome: Progressing     Problem: Pain  Goal: Verbalizes/displays adequate comfort level or baseline comfort level  2/5/2025 1441 by Ondina Cuevas RN  Outcome: Completed  2/5/2025 0429 by Rachid Luna RN  Outcome: Progressing

## 2025-02-05 NOTE — DISCHARGE INSTR - COC
Continuity of Care Form    Patient Name: Daniel Mcdonald   :  1955  MRN:  [id number]    Admit date:  2025  Discharge date:  2025    Code Status Order: Full Code   Advance Directives:   Advance Care Flowsheet Documentation             Admitting Physician:  Nahun Charlton MD  PCP: Geoff Kauffman DO    Discharging Nurse: NIKITA Nj  Discharging Hospital Unit/Room#: 8412/8412-A  Discharging Unit Phone Number: [phone]    Emergency Contact:   Extended Emergency Contact Information  Primary Emergency Contact: Michaelle Mcdonald  Address: [address] [place] [address] [facility]  Home Phone: [phone]  Mobile Phone: [phone]  Relation: Spouse   needed? No  Secondary Emergency Contact: [place] [address] [facility]  Home Phone: [phone]  Mobile Phone: [phone]  Relation: Brother/Sister   needed? No    Past Surgical History:  Past Surgical History:   Procedure Laterality Date    ANKLE SURGERY Left 10/16/2024    Left Ankle Irrigation and Debridement with Placement of Left Ankle Spanning External Fixator performed by Jose Morrissey DO at [facility]    CARDIAC SURGERY  2019    ACB x 5 at [facility]    COLONOSCOPY  2006    ENDOSCOPY, COLON, DIAGNOSTIC      THROAT SURGERY      TONSILLECTOMY      WISDOM TOOTH EXTRACTION         Immunization History:   There is no immunization history for the selected administration types on file for this patient.    Active Problems:  Patient Active Problem List   Diagnosis Code    Morbid obesity with BMI of 60.0-69.9, adult E66.01, Z68.44    SUKHWINDER (obstructive sleep apnea) G47.33    Diabetes mellitus type 2, uncontrolled [id number]    Coronary artery disease involving native coronary artery without angina pectoris I25.10    Hyperlipidemia LDL goal <100 E78.5    Essential hypertension I10    Acute kidney injury (HCC) N17.9    Acute

## 2025-02-06 NOTE — PROGRESS NOTES
HOSPITALIST PROGRESS NOTE    Patient's name: Daniel Mcdonald  : 1955  Admission date: 2025  Date of service: 2025   Primary care physician: Geoff Kauffman DO    Assessment   Patient admitted on 2025     Daniel Mcdonald is a 69 y.o. male patient of Geoff Kauffman DO with history of Recent trimalleolar fracture of left ankle currently in rehab, arthritis, CAD, diabetes, hypertension, hyperlipidemia, morbid obesity, pulmonary embolism on Eliquis presented to the hospital with concern of constipation.  Patient had CT abdomen in the ER showing moderate to severe constipation.  Patient was anemic with hemoglobin of 9.9 with baseline of around 12.  Patient was seen by surgical team and started on aggressive bowel regimen.  Patient did complain of having BRBPR which were likely hemorrhoidal in nature per surgical team.  Continue aggressive bowel regimen.     Constipation  BRBPR likely hemorrhoidal  Continue to monitor H&H, transfuse for hemoglobin less than 7  Had multiple bowel movements overnight.  H&H is slowly trending down.  Continue to monitor.  Out of bed, ambulate  PT/OT    Recent trimalleolar fracture of left ankle  PT/OT, patient does not want to go back to previous rehab,  is aware and helping with discharge needs    Pulmonary embolism  CAD  Hypertension  Hyperlipidemia  Morbid obesity  Continue home medications  Lifestyle modification      Follow labs   DVT prophylaxis Coumadin  Please see orders for further management and care.  Discharge plan: Continue aggressive bowel regimen, possible discharge in 24 to 48 hours    Subjective  Daniel was seen and examined at bedside.  Patient is alert and oriented, no acute overnight events.  Had multiple bowel movement.  Feeling weak.  H&H slowly trending down.    Review of Systems  All systems reviewed and negative except mentioned above.       Objective  Most Recent Recorded Vitals  BP (!) 126/52   Pulse 82   Temp 
4 Eyes Skin Assessment     NAME:  Daniel Mcdonald  YOB: 1955  MEDICAL RECORD NUMBER:  [id number]    The patient is being assessed for  Admission    I agree that at least one RN has performed a thorough Head to Toe Skin Assessment on the patient. ALL assessment sites listed below have been assessed.      Areas assessed by both nurses:    Head, Face, Ears, Shoulders, Back, Chest, Arms, Elbows, Hands, Sacrum. Buttock, Coccyx, Ischium, Legs. Feet and Heels, and Under Medical Devices         Does the Patient have a Wound? No noted wound(s)    Excoriation to BL Breast Folds, Groin Folds, Abdominal Folds  Buttocks Reddened - Blanching  Ecchymotic areas BUE  Vasc. Disc. And Dry flaky BLE  Scar Chest, L Knee  Ace bandage to L Ankle - would not allow this nurse to remove. Previous surgery with incision.        Lupillo Prevention initiated by RN: Yes  Wound Care Orders initiated by RN: Yes    Pressure Injury (Stage 3,4, Unstageable, DTI, NWPT, and Complex wounds) if present, place Wound referral order by RN under : No    New Ostomies, if present place, Ostomy referral order under : No     Nurse 1 eSignature: Electronically signed by BEBO RAMIREZ RN on 2/2/25 at 5:06 AM EST    **SHARE this note so that the co-signing nurse can place an eSignature**    Nurse 2 eSignature: {Esignature:[id number]}  
4 Eyes Skin Assessment     NAME:  Daniel Mcdonald  YOB: 1955  MEDICAL RECORD NUMBER:  [id number]    The patient is being assessed for  Transfer to New Unit    I agree that at least one RN has performed a thorough Head to Toe Skin Assessment on the patient. ALL assessment sites listed below have been assessed.      Areas assessed by both nurses:    Head, Face, Ears, Shoulders, Back, Chest, Arms, Elbows, Hands, Sacrum. Buttock, Coccyx, Ischium, and Legs. Feet and Heels        Does the Patient have a Wound? No noted wound(s)       Lupillo Prevention initiated by RN: No4 Eyes Skin Assessment     NAME:  Daniel Mcdonald  YOB: 1955  MEDICAL RECORD NUMBER:  [id number]    The patient is being assessed for  Transfer to New Unit    I agree that at least one RN has performed a thorough Head to Toe Skin Assessment on the patient. ALL assessment sites listed below have been assessed.      Areas assessed by both nurses:    Head, Face, Ears, Shoulders, Back, Chest, Arms, Elbows, Hands, Sacrum. Buttock, Coccyx, Ischium, and Legs. Feet and Heels        Does the Patient have a Wound? No noted wound(s)       Lupillo Prevention initiated by RN: Yes  Wound Care Orders initiated by RN: No    Pressure Injury (Stage 3,4, Unstageable, DTI, NWPT, and Complex wounds) if present, place Wound referral order by RN under : No    New Ostomies, if present place, Ostomy referral order under : No     Nurse 1 eSignature: Electronically signed by Jennifer Johnson RN on 2/3/25 at 6:42 PM EST    **SHARE this note so that the co-signing nurse can place an eSignature**    New Ostomies, if present place, Ostomy referral order under : No     Nurse 1 eSignature: Electronically signed by Jennifer Johnson RN on 2/3/25 at 6:44 PM EST    **SHARE this note so that the co-signing nurse can place an eSignature**    Nurse 2 eSignature: {Esignature:[id number]}  
After Gycolax, 2 ducolax tabs, 2 senna and chronulac this AM. Pt has had a total of 8 BM's from formed to soft to liquid and incontinent .  
Attempted to call [facility] for updated list of medications. This nurse was hung up on twice.   
GENERAL SURGERY  DAILY PROGRESS NOTE  2/3/2025  Cc:   Chief Complaint   Patient presents with    Constipation     From [facility] [address]. His room number is 313. Facility did not call, per [facility], they gave me the address to the facility. When [facility] called, they stated that patient was tired of waiting for them to give him an enema. He had miralax with no results so far.       SUBJECTIVE:  No new complaints or overnight events. Had one hard, solid BM reported to be non-bloody last evening. No signs of bleeding.     OBJECTIVE:  BP (!) 154/53   Pulse 77   Temp 98.1 °F (36.7 °C) (Oral)   Resp 18   Ht 1.778 m (5' 10\")   Wt (!) 162.1 kg (357 lb 5.9 oz)   SpO2 90%   BMI 51.28 kg/m²   I/O last 3 completed shifts:  In: 300 [P.O.:300]  Out: 1450 [Urine:1450]    GENERAL: No acute distress.  HEAD: NCAT.   EYES: Anicteric. Round symmetric pupils.  CV: RR.  LUNGS/CHEST: No increased work of breathing on RA.  ABDOMEN: Soft, no tenderness, non distended.    LABS:  CBC  Recent Labs     02/01/25 1815 02/03/25  0606   WBC 9.5 8.8   RBC 5.20 5.08   HGB 9.9* 9.7*   HCT 34.9* 34.4*   MCV 67.1* 67.7*   MCH 19.0* 19.1*   MCHC 28.4* 28.2*   RDW 18.7* 19.0*     --    MPV 10.0 Can not be calculated     BMP  Recent Labs     02/01/25 1815      K 5.5*      CO2 29   BUN 61*   CREATININE 1.6*   GLUCOSE 175*   CALCIUM 9.8     Liver Function  Recent Labs     02/01/25 1815   ALKPHOS 62   ALT 7   AST 9   BILITOT 0.4     Recent Labs     02/01/25 1815   LIPASE 22     No results for input(s): \"LACTATE\" in the last 72 hours.  Recent Labs     02/01/25 1815   INR 1.7       RADIOLOGY:  I have personally reviewed all relevant imaging:      ASSESSMENT:  69 y.o. male with anemia and BRBPR likely hemorrhoidal in nature given recent orthopedic surgery and complaints of constipation. Clinically no overt signs of bleeding.     PLAN:  - likely experiencing intermittent BRBPR 2/2 constipation and straining 
Left leg dressing was changed, cleansed with peroxide. ABD, kerlix and ace wrapped was placed.   
Messaged Dr. Thomas to see if patient able to downgrade  
Notified Dr. Allan of Gen. Surgery consult.   
Nurse to nurse report called, given to Liv.   
[facility] ETA 10 minutes.  
[facility] called and they will be delayed. ETA 8PM.  
Patient calling , cardiologist, and nursing station d/t rectal pain from attempting to have a bowel movement. Only specifically wanted this nurse to come in his room. Charge nurse explained this nurse was caring for another patient and would be in right after. Patient angry because this nurse had not been in his room for 45 minutes. Upon entering room patient yelled and stated this nurse let him down. Explained to patient this nurse was in another room and is caring for 5 other patients as well. Patient stated he was about to call the  of this hospital d/t his rectal pain and waiting on this nurse. This nurse assisted patient in having a large bowel movement.   
Patient received the sacrament of the anointing of the sick and communion by Father Niels High on Tuesday February 4, 2025.   If additional support is needed, please reach out to Spiritual Health (ext. [phone]).    Rev ABIDA Davidson MDIV,      
Patient refusing SCDs.   
Patient refusing turning, repositioning.  
Patient refusing turns, educated about the importance but still refuses.   
Pharmacy Consultation Note  (Warfarin Dosing and Monitoring)    Initial consult date: 2/3/25  Consulting Provider: William GONZALES Harry is a 69 y.o. male for whom pharmacy has been asked to manage warfarin therapy.     Weight:   Wt Readings from Last 1 Encounters:   02/02/25 (!) 162.1 kg (357 lb 5.9 oz)       TSH:    Lab Results   Component Value Date/Time    TSH 1.830 07/27/2020 08:57 AM       Hepatic Function Panel:                            Lab Results   Component Value Date/Time    ALKPHOS 62 02/01/2025 06:15 PM    ALT 7 02/01/2025 06:15 PM    AST 9 02/01/2025 06:15 PM    BILITOT 0.4 02/01/2025 06:15 PM       Current significant warfarin drug-drug interactions include: No significant interactions    Recent Labs     02/01/25  1815 02/03/25  0606   HGB 9.9* 9.7*     --      Date Warfarin Dose INR Heparin or LMWH Comment   2/3 <4 mg> na  Hemorrhoidal bleed                                 Assessment:  Patient is a 69 y.o. male on warfarin for History of  VTE/PE.  Patient's home warfarin dosing regimen is 4 mg M, TH, F, SA, PRASAD and 3.5 mg TU WE   Goal INR 2 - 3  INR not available today. Last INR 1.7 on 2/1    Plan:  Warfarin 4 mg tonight  Daily PT/INR until the INR is stable within the therapeutic range  Pharmacist will follow and monitor/adjust dosing as necessary    Thank you for this consult,  Aldo Ordaz, RaynaD, BCGP 2/3/2025 2:02 PM      TIMMY: [phone]  SEY: [phone]  SJW: [phone]      
Pharmacy Consultation Note  (Warfarin Dosing and Monitoring)    Initial consult date: 2/3/25  Consulting Provider: William GONZALES Harry is a 69 y.o. male for whom pharmacy has been asked to manage warfarin therapy.     Weight:   Wt Readings from Last 1 Encounters:   02/03/25 (!) 152 kg (335 lb)       TSH:    Lab Results   Component Value Date/Time    TSH 1.830 07/27/2020 08:57 AM       Hepatic Function Panel:                            Lab Results   Component Value Date/Time    ALKPHOS 62 02/01/2025 06:15 PM    ALT 7 02/01/2025 06:15 PM    AST 9 02/01/2025 06:15 PM    BILITOT 0.4 02/01/2025 06:15 PM       Current significant warfarin drug-drug interactions include: No significant interactions    Recent Labs     02/01/25  1815 02/03/25  0606   HGB 9.9* 9.7*     --      Date Warfarin Dose INR Heparin or LMWH Comment   2/3 4 mg na  Hemorrhoidal bleed   2/4 <4 mg> 1.7                            Assessment:  Patient is a 69 y.o. male on warfarin for History of  VTE/PE.  Patient's home warfarin dosing regimen is 4 mg M, TH, F, SA, PRASAD and 3.5 mg TU WE   Goal INR 2 - 3  INR = 1.7 2/4    Plan:  Warfarin 4 mg tonight  Daily PT/INR until the INR is stable within the therapeutic range  Pharmacist will follow and monitor/adjust dosing as necessary    Thank you for this consult,    Josias Stein  PharmD Candidate 2025 2/4/2025 8:44 AM            
Pharmacy Consultation Note  (Warfarin Dosing and Monitoring)    Initial consult date: 2/3/25  Consulting Provider: William GONZALES Harry is a 69 y.o. male for whom pharmacy has been asked to manage warfarin therapy.     Weight:   Wt Readings from Last 1 Encounters:   02/03/25 (!) 152 kg (335 lb)       TSH:    Lab Results   Component Value Date/Time    TSH 1.830 07/27/2020 08:57 AM       Hepatic Function Panel:                            Lab Results   Component Value Date/Time    ALKPHOS 62 02/01/2025 06:15 PM    ALT 7 02/01/2025 06:15 PM    AST 9 02/01/2025 06:15 PM    BILITOT 0.4 02/01/2025 06:15 PM       Current significant warfarin drug-drug interactions include: No significant interactions    Recent Labs     02/03/25  0606 02/04/25  0920 02/05/25  0559   HGB 9.7* 8.8* 8.9*     Date Warfarin Dose INR Heparin or LMWH Comment   2/3 4 mg na  Hemorrhoidal bleed   2/4 4 mg 1.7     2/5 <2.5 mg> 2.0                     Assessment:  Patient is a 69 y.o. male on warfarin for History of  VTE/PE.  Patient's home warfarin dosing regimen is 4 mg M, TH, F, SA, PRASAD and 3.5 mg TU WE (According to [facility] in January).   Patient reports that he takes 2.5 mg daily  Goal INR 2 - 3  2/4: INR = 1.7  2/5: INR= 2.0    Plan:  Warfarin 2.5 mg tonight (home dose)  Daily PT/INR until the INR is stable within the therapeutic range  Pharmacist will follow and monitor/adjust dosing as necessary    Thank you for this consult,    Josias Stein  PharmD Candidate 2025 2/5/2025 8:08 AM            
Physical Therapy  Physical Therapy Initial Assessment       Name: Daniel Mcdonald  : 1955  MRN: [id number]      Date of Service: 2025    Evaluating PT:  Kayla Cadena PT, DPT [id number]    Room #:  8412/8412-A  Diagnosis:  Anemia [D64.9]  PMHx/PSHx:   has a past medical history of Arthritis, CAD (coronary artery disease), Cellulitis, Diabetes mellitus (HCC), Hyperlipidemia, Hypertension, Kidney failure, Morbid obesity with body mass index (BMI) of 60.0 to 69.9 in adult, Pulmonary embolism (HCC), and Sleep apnea.    Procedure/Surgery:  none this admission  Precautions: Falls,  incontinent, NWB L LE (hx trimalleolar fx), bariatric bed, alarms/ TSM     SUBJECTIVE:    Pt admitted from [facility]. Pt recently at ARU. Pt reports he transferred to chair via mike, and required x2 assist to attempt STS transfers.     Equipment Owned: ?  Equipment Needs:  TBD    OBJECTIVE:   Initial Evaluation  Date: 25 Treatment  Date:  Short Term/ Long Term   Goals   AM-PAC 6 Clicks      Was pt agreeable to Eval/treatment? Yes      Does pt have pain? 6/10 \"bowel\" pain      Bed Mobility  Rolling: MaxA  Supine to sit: MaxA  Sit to supine: MaxA  Scooting: MaxA    (Second person present for safety)  Rolling: Cong  Supine to sit: Cong  Sit to supine: Cong  Scooting: Cong   Transfers Sit to stand: NT d/t safety  Stand to sit: NT  Stand pivot: NT  Sit to stand: ModA  Stand to sit: ModA  Stand pivot: ModA AAD  NWB L LE   Ambulation    NT   10 feet with AAD MaxA  NWB L LE   Stair negotiation: ascended and descended  NT   TBD    ROM BUE:  Defer to OT  BLE:  WFL, L ankle splinted      Strength BUE:  Defer to OT  BLE:  3+/5, L ankle splinted   Improve 1 MMT   Balance Sitting EOB:  Cong  Dynamic Standing:  NT  Sitting EOB:  Supervision   Dynamic Standing:  MaxA AAD     Pt is A & O x 4. Pt follows all directions.   Sensation: no abnormalities noted   Edema:  unremarkable     Vitals:    HR and Spo2% WNL on room air       Therapeutic Exercises:  
Pt refused Q2  turns this shift . This nurse explained the importance of turning every 2 hours but pt still refused.  
<1 minute to improve dynamic sitting balance and activity tolerance during ADLs.   Activity tolerance- Instruction/training on energy conservation/work simplification for completion of ADLs:.     Neuromuscular Reeducation to facilitate balance/righting reactions for increased function with ADLs tasks:    Skilled positioning/alignment-  Therapist facilitated proper positioning/alignment throughout session to maintain skin/joint integrity & proper body mechanics.   Skilled monitoring of vitals- To maximize safe participation throughout functional activities.   Pt appeared to have tolerated session well and appears cooperative/pleasant. Pt instructed on use of call light for assistance and fall prevention. Pt demo'ing understanding of education provided. Continue to educate.     Rehab Potential: Fair  for established goals     LTG: maximize independence and safety with ADLs to return to PLOF    Patient and/or family were instructed on functional diagnosis, prognosis/goals and OT plan of care. Demonstrated fair  understanding.      Eval Complexity:      Description  Performance deficits  Clinical decision making  Co-morbidities affecting occupational performance  Modification or assistance to complete eval    Low Complexity   1 to 3 []  Low [x]  None []  None []   Moderate Complexity   3 to 5 [x]  Mod []  Maybe []  Min to Mod [x]   High Complexity   5 or more []  High []  Yes [x]  Max []     The above evaluation is classified as low complexity based off the noted performance deficits, personal factors, co-morbidities, assistance required, and other factors as noted in the clinical evaluation and functional testing.     Evaluation time includes thorough review of current medical information, gathering information on past medical & social history & PLOF, completion of standardized testing, informal observation of tasks, consultation with other medical professions/disciplines, assessment of data & development of POC/goals. 
ON 2/4/2025] aspirin EC tablet 81 mg  81 mg Oral Daily John Thomas MD        pantoprazole (PROTONIX) 40 mg in sodium chloride (PF) 0.9 % 10 mL injection  40 mg IntraVENous Q12H Nahun Charlton MD   40 mg at 02/03/25 0551    sodium chloride flush 0.9 % injection 5-40 mL  5-40 mL IntraVENous 2 times per day Nahun Charlton MD   10 mL at 02/03/25 0846    sodium chloride flush 0.9 % injection 5-40 mL  5-40 mL IntraVENous PRN Nahun Charlton MD        0.9 % sodium chloride infusion   IntraVENous PRN Nahun Charlton MD        potassium chloride (KLOR-CON M) extended release tablet 40 mEq  40 mEq Oral PRN Nahun Charlton MD        Or    potassium bicarb-citric acid (EFFER-K) effervescent tablet 40 mEq  40 mEq Oral PRN Nahun Charlton MD        Or    potassium chloride 10 mEq/100 mL IVPB (Peripheral Line)  10 mEq IntraVENous PRN Nahun Charlton MD        magnesium sulfate 2000 mg in 50 mL IVPB premix  2,000 mg IntraVENous PRN Nahun Charlton MD        ondansetron (ZOFRAN-ODT) disintegrating tablet 4 mg  4 mg Oral Q8H PRN Nahun Charlton MD        Or    ondansetron (ZOFRAN) injection 4 mg  4 mg IntraVENous Q6H PRN Nahun Charlton MD        acetaminophen (TYLENOL) tablet 650 mg  650 mg Oral Q6H PRN Nahun Charlton MD   650 mg at 02/02/25 2114    Or    acetaminophen (TYLENOL) suppository 650 mg  650 mg Rectal Q6H PRN Nahun Charlton MD        insulin lispro (HUMALOG,ADMELOG) injection vial 0-4 Units  0-4 Units SubCUTAneous 4x Daily AC & HS Nahun Charlton MD        glucose chewable tablet 16 g  4 tablet Oral PRN Nahun Charlton MD        dextrose bolus 10% 125 mL  125 mL IntraVENous PRN Nahun Charlton MD        Or    dextrose bolus 10% 250 mL  250 mL IntraVENous PRN Nahun Charlton MD        glucagon injection 1 mg  1 mg SubCUTAneous PRN Nahun Charlton MD        dextrose 10 % infusion   IntraVENous Continuous PRN Nahun Charlton MD

## 2025-03-13 ENCOUNTER — APPOINTMENT (OUTPATIENT)
Dept: GENERAL RADIOLOGY | Age: 70
DRG: 189 | End: 2025-03-13
Payer: MEDICARE

## 2025-03-13 ENCOUNTER — HOSPITAL ENCOUNTER (INPATIENT)
Age: 70
LOS: 5 days | Discharge: NURSING, REHAB OR LONG-TERM CARE | DRG: 189 | End: 2025-03-19
Attending: EMERGENCY MEDICINE | Admitting: FAMILY MEDICINE
Payer: MEDICARE

## 2025-03-13 DIAGNOSIS — I50.9 CONGESTIVE HEART FAILURE, UNSPECIFIED HF CHRONICITY, UNSPECIFIED HEART FAILURE TYPE (HCC): Primary | ICD-10-CM

## 2025-03-13 DIAGNOSIS — J96.01 ACUTE HYPOXIC RESPIRATORY FAILURE: ICD-10-CM

## 2025-03-13 DIAGNOSIS — R79.89 ELEVATED BRAIN NATRIURETIC PEPTIDE (BNP) LEVEL: ICD-10-CM

## 2025-03-13 DIAGNOSIS — R79.89 ELEVATED TROPONIN: ICD-10-CM

## 2025-03-13 DIAGNOSIS — R57.1 HYPOVOLEMIC SHOCK (HCC): ICD-10-CM

## 2025-03-13 PROCEDURE — 80053 COMPREHEN METABOLIC PANEL: CPT

## 2025-03-13 PROCEDURE — 84484 ASSAY OF TROPONIN QUANT: CPT

## 2025-03-13 PROCEDURE — 2580000003 HC RX 258

## 2025-03-13 PROCEDURE — 83735 ASSAY OF MAGNESIUM: CPT

## 2025-03-13 PROCEDURE — 2580000003 HC RX 258: Performed by: EMERGENCY MEDICINE

## 2025-03-13 PROCEDURE — 83605 ASSAY OF LACTIC ACID: CPT

## 2025-03-13 PROCEDURE — 51701 INSERT BLADDER CATHETER: CPT

## 2025-03-13 PROCEDURE — 71045 X-RAY EXAM CHEST 1 VIEW: CPT

## 2025-03-13 PROCEDURE — 84145 PROCALCITONIN (PCT): CPT

## 2025-03-13 PROCEDURE — 93005 ELECTROCARDIOGRAM TRACING: CPT

## 2025-03-13 PROCEDURE — 99285 EMERGENCY DEPT VISIT HI MDM: CPT

## 2025-03-13 PROCEDURE — 83880 ASSAY OF NATRIURETIC PEPTIDE: CPT

## 2025-03-13 PROCEDURE — 85025 COMPLETE CBC W/AUTO DIFF WBC: CPT

## 2025-03-13 PROCEDURE — 96360 HYDRATION IV INFUSION INIT: CPT

## 2025-03-13 PROCEDURE — 87040 BLOOD CULTURE FOR BACTERIA: CPT

## 2025-03-13 PROCEDURE — 83690 ASSAY OF LIPASE: CPT

## 2025-03-13 RX ORDER — 0.9 % SODIUM CHLORIDE 0.9 %
1000 INTRAVENOUS SOLUTION INTRAVENOUS ONCE
Status: COMPLETED | OUTPATIENT
Start: 2025-03-13 | End: 2025-03-14

## 2025-03-13 RX ADMIN — SODIUM CHLORIDE 1000 ML: 9 INJECTION, SOLUTION INTRAVENOUS at 23:23

## 2025-03-13 RX ADMIN — SODIUM CHLORIDE 1000 ML: 0.9 INJECTION, SOLUTION INTRAVENOUS at 23:22

## 2025-03-13 RX ADMIN — SODIUM CHLORIDE 1000 ML: 0.9 INJECTION, SOLUTION INTRAVENOUS at 23:21

## 2025-03-14 ENCOUNTER — APPOINTMENT (OUTPATIENT)
Dept: CT IMAGING | Age: 70
DRG: 189 | End: 2025-03-14
Payer: MEDICARE

## 2025-03-14 ENCOUNTER — APPOINTMENT (OUTPATIENT)
Age: 70
DRG: 189 | End: 2025-03-14
Attending: FAMILY MEDICINE
Payer: MEDICARE

## 2025-03-14 PROBLEM — J96.01 ACUTE HYPOXIC RESPIRATORY FAILURE: Status: ACTIVE | Noted: 2025-03-14

## 2025-03-14 PROBLEM — I50.9 CONGESTIVE HEART FAILURE (HCC): Status: ACTIVE | Noted: 2025-03-14

## 2025-03-14 LAB
ALBUMIN SERPL-MCNC: 2.9 G/DL (ref 3.5–5.2)
ALP SERPL-CCNC: 60 U/L (ref 40–129)
ALT SERPL-CCNC: 9 U/L (ref 0–40)
AMORPH SED URNS QL MICRO: PRESENT
ANION GAP SERPL CALCULATED.3IONS-SCNC: 13 MMOL/L (ref 7–16)
AST SERPL-CCNC: 12 U/L (ref 0–39)
ATYPICAL LYMPHOCYTE ABSOLUTE COUNT: 0.07 K/UL (ref 0–0.46)
ATYPICAL LYMPHOCYTES: 1 % (ref 0–4)
B PARAP IS1001 DNA NPH QL NAA+NON-PROBE: NOT DETECTED
B PERT DNA SPEC QL NAA+PROBE: NOT DETECTED
BACTERIA URNS QL MICRO: ABNORMAL
BASOPHILS # BLD: 0.14 K/UL (ref 0–0.2)
BASOPHILS NFR BLD: 2 % (ref 0–2)
BILIRUB SERPL-MCNC: 0.5 MG/DL (ref 0–1.2)
BILIRUB UR QL STRIP: NEGATIVE
BNP SERPL-MCNC: 2033 PG/ML (ref 0–125)
BUN SERPL-MCNC: 52 MG/DL (ref 6–23)
C PNEUM DNA NPH QL NAA+NON-PROBE: NOT DETECTED
CALCIUM SERPL-MCNC: 7.6 MG/DL (ref 8.6–10.2)
CHLORIDE SERPL-SCNC: 107 MMOL/L (ref 98–107)
CLARITY UR: ABNORMAL
CO2 SERPL-SCNC: 19 MMOL/L (ref 22–29)
COLOR UR: YELLOW
CREAT SERPL-MCNC: 1.7 MG/DL (ref 0.7–1.2)
EKG ATRIAL RATE: 101 BPM
EKG P AXIS: 50 DEGREES
EKG P-R INTERVAL: 164 MS
EKG Q-T INTERVAL: 382 MS
EKG QRS DURATION: 72 MS
EKG QTC CALCULATION (BAZETT): 495 MS
EKG R AXIS: 44 DEGREES
EKG T AXIS: 60 DEGREES
EKG VENTRICULAR RATE: 101 BPM
EOSINOPHIL # BLD: 0 K/UL (ref 0.05–0.5)
EOSINOPHILS RELATIVE PERCENT: 0 % (ref 0–6)
ERYTHROCYTE [DISTWIDTH] IN BLOOD BY AUTOMATED COUNT: 20.2 % (ref 11.5–15)
FLUAV RNA NPH QL NAA+NON-PROBE: NOT DETECTED
FLUBV RNA NPH QL NAA+NON-PROBE: NOT DETECTED
GFR, ESTIMATED: 43 ML/MIN/1.73M2
GLUCOSE SERPL-MCNC: 152 MG/DL (ref 74–99)
GLUCOSE UR STRIP-MCNC: 100 MG/DL
HADV DNA NPH QL NAA+NON-PROBE: NOT DETECTED
HCOV 229E RNA NPH QL NAA+NON-PROBE: NOT DETECTED
HCOV HKU1 RNA NPH QL NAA+NON-PROBE: NOT DETECTED
HCOV NL63 RNA NPH QL NAA+NON-PROBE: NOT DETECTED
HCOV OC43 RNA NPH QL NAA+NON-PROBE: NOT DETECTED
HCT VFR BLD AUTO: 31.5 % (ref 37–54)
HGB BLD-MCNC: 8.5 G/DL (ref 12.5–16.5)
HGB UR QL STRIP.AUTO: NEGATIVE
HMPV RNA NPH QL NAA+NON-PROBE: NOT DETECTED
HPIV1 RNA NPH QL NAA+NON-PROBE: NOT DETECTED
HPIV2 RNA NPH QL NAA+NON-PROBE: NOT DETECTED
HPIV3 RNA NPH QL NAA+NON-PROBE: NOT DETECTED
HPIV4 RNA NPH QL NAA+NON-PROBE: NOT DETECTED
INR PPP: 1.2
KETONES UR STRIP-MCNC: NEGATIVE MG/DL
LACTATE BLDV-SCNC: 0.9 MMOL/L (ref 0.5–1.9)
LACTATE BLDV-SCNC: 0.9 MMOL/L (ref 0.5–2.2)
LACTATE BLDV-SCNC: 1 MMOL/L (ref 0.5–1.9)
LACTATE BLDV-SCNC: 1.3 MMOL/L (ref 0.5–1.9)
LEUKOCYTE ESTERASE UR QL STRIP: NEGATIVE
LIPASE SERPL-CCNC: 14 U/L (ref 13–60)
LYMPHOCYTES NFR BLD: 0 K/UL (ref 1.5–4)
LYMPHOCYTES RELATIVE PERCENT: 0 % (ref 20–42)
M PNEUMO DNA NPH QL NAA+NON-PROBE: NOT DETECTED
MAGNESIUM SERPL-MCNC: 1.6 MG/DL (ref 1.6–2.6)
MCH RBC QN AUTO: 17.3 PG (ref 26–35)
MCHC RBC AUTO-ENTMCNC: 27 G/DL (ref 32–34.5)
MCV RBC AUTO: 64.3 FL (ref 80–99.9)
MONOCYTES NFR BLD: 0.07 K/UL (ref 0.1–0.95)
MONOCYTES NFR BLD: 1 % (ref 2–12)
NEUTROPHILS NFR BLD: 97 % (ref 43–80)
NEUTS SEG NFR BLD: 8.01 K/UL (ref 1.8–7.3)
NITRITE UR QL STRIP: NEGATIVE
PH UR STRIP: 5.5 [PH] (ref 5–8)
PLATELET, FLUORESCENCE: 217 K/UL (ref 130–450)
PMV BLD AUTO: ABNORMAL FL (ref 7–12)
POTASSIUM SERPL-SCNC: 5.2 MMOL/L (ref 3.5–5)
PROCALCITONIN SERPL-MCNC: 0.37 NG/ML (ref 0–0.08)
PROT SERPL-MCNC: 5.1 G/DL (ref 6.4–8.3)
PROT UR STRIP-MCNC: >=300 MG/DL
PROTHROMBIN TIME: 13.5 SEC (ref 9.3–12.4)
RBC # BLD AUTO: 4.9 M/UL (ref 3.8–5.8)
RBC # BLD: ABNORMAL 10*6/UL
RBC #/AREA URNS HPF: ABNORMAL /HPF
RSV RNA NPH QL NAA+NON-PROBE: NOT DETECTED
RV+EV RNA NPH QL NAA+NON-PROBE: NOT DETECTED
SARS-COV-2 RNA NPH QL NAA+NON-PROBE: NOT DETECTED
SODIUM SERPL-SCNC: 139 MMOL/L (ref 132–146)
SP GR UR STRIP: 1.02 (ref 1–1.03)
SPECIMEN DESCRIPTION: NORMAL
TROPONIN I SERPL HS-MCNC: 138 NG/L (ref 0–11)
TROPONIN I SERPL HS-MCNC: 146 NG/L (ref 0–11)
UROBILINOGEN UR STRIP-ACNC: 0.2 EU/DL (ref 0–1)
WBC #/AREA URNS HPF: ABNORMAL /HPF
WBC OTHER # BLD: 8.3 K/UL (ref 4.5–11.5)

## 2025-03-14 PROCEDURE — 6370000000 HC RX 637 (ALT 250 FOR IP): Performed by: INTERNAL MEDICINE

## 2025-03-14 PROCEDURE — 0202U NFCT DS 22 TRGT SARS-COV-2: CPT

## 2025-03-14 PROCEDURE — 6360000002 HC RX W HCPCS

## 2025-03-14 PROCEDURE — 87086 URINE CULTURE/COLONY COUNT: CPT

## 2025-03-14 PROCEDURE — 6360000004 HC RX CONTRAST MEDICATION: Performed by: RADIOLOGY

## 2025-03-14 PROCEDURE — 84484 ASSAY OF TROPONIN QUANT: CPT

## 2025-03-14 PROCEDURE — 2500000003 HC RX 250 WO HCPCS: Performed by: FAMILY MEDICINE

## 2025-03-14 PROCEDURE — 2500000003 HC RX 250 WO HCPCS: Performed by: INTERNAL MEDICINE

## 2025-03-14 PROCEDURE — 99222 1ST HOSP IP/OBS MODERATE 55: CPT | Performed by: FAMILY MEDICINE

## 2025-03-14 PROCEDURE — 93010 ELECTROCARDIOGRAM REPORT: CPT | Performed by: INTERNAL MEDICINE

## 2025-03-14 PROCEDURE — 2580000003 HC RX 258: Performed by: FAMILY MEDICINE

## 2025-03-14 PROCEDURE — 74177 CT ABD & PELVIS W/CONTRAST: CPT

## 2025-03-14 PROCEDURE — 71275 CT ANGIOGRAPHY CHEST: CPT

## 2025-03-14 PROCEDURE — 83605 ASSAY OF LACTIC ACID: CPT

## 2025-03-14 PROCEDURE — 70450 CT HEAD/BRAIN W/O DYE: CPT

## 2025-03-14 PROCEDURE — 2700000000 HC OXYGEN THERAPY PER DAY

## 2025-03-14 PROCEDURE — C8929 TTE W OR WO FOL WCON,DOPPLER: HCPCS

## 2025-03-14 PROCEDURE — APPSS60 APP SPLIT SHARED TIME 46-60 MINUTES

## 2025-03-14 PROCEDURE — 96367 TX/PROPH/DG ADDL SEQ IV INF: CPT

## 2025-03-14 PROCEDURE — 2140000000 HC CCU INTERMEDIATE R&B

## 2025-03-14 PROCEDURE — 96365 THER/PROPH/DIAG IV INF INIT: CPT

## 2025-03-14 PROCEDURE — 6370000000 HC RX 637 (ALT 250 FOR IP): Performed by: FAMILY MEDICINE

## 2025-03-14 PROCEDURE — 2580000003 HC RX 258

## 2025-03-14 PROCEDURE — 99223 1ST HOSP IP/OBS HIGH 75: CPT | Performed by: INTERNAL MEDICINE

## 2025-03-14 PROCEDURE — 85610 PROTHROMBIN TIME: CPT

## 2025-03-14 PROCEDURE — 6370000000 HC RX 637 (ALT 250 FOR IP)

## 2025-03-14 PROCEDURE — 81001 URINALYSIS AUTO W/SCOPE: CPT

## 2025-03-14 RX ORDER — LOPERAMIDE HYDROCHLORIDE 2 MG/1
2 CAPSULE ORAL 4 TIMES DAILY PRN
COMMUNITY

## 2025-03-14 RX ORDER — SODIUM CHLORIDE 9 MG/ML
INJECTION, SOLUTION INTRAVENOUS PRN
Status: DISCONTINUED | OUTPATIENT
Start: 2025-03-14 | End: 2025-03-20 | Stop reason: HOSPADM

## 2025-03-14 RX ORDER — METOPROLOL TARTRATE 25 MG/1
25 TABLET, FILM COATED ORAL 2 TIMES DAILY
Status: DISCONTINUED | OUTPATIENT
Start: 2025-03-14 | End: 2025-03-15

## 2025-03-14 RX ORDER — SODIUM CHLORIDE 9 MG/ML
INJECTION, SOLUTION INTRAVENOUS CONTINUOUS
Status: DISCONTINUED | OUTPATIENT
Start: 2025-03-14 | End: 2025-03-14

## 2025-03-14 RX ORDER — PANTOPRAZOLE SODIUM 20 MG/1
20 TABLET, DELAYED RELEASE ORAL DAILY
Status: DISCONTINUED | OUTPATIENT
Start: 2025-03-14 | End: 2025-03-20 | Stop reason: HOSPADM

## 2025-03-14 RX ORDER — POLYETHYLENE GLYCOL 3350 17 G/17G
17 POWDER, FOR SOLUTION ORAL DAILY PRN
Status: DISCONTINUED | OUTPATIENT
Start: 2025-03-14 | End: 2025-03-20 | Stop reason: HOSPADM

## 2025-03-14 RX ORDER — WARFARIN SODIUM 4 MG/1
4 TABLET ORAL
Status: DISPENSED | OUTPATIENT
Start: 2025-03-14 | End: 2025-03-15

## 2025-03-14 RX ORDER — AMMONIUM LACTATE 12 G/100G
LOTION TOPICAL PRN
COMMUNITY

## 2025-03-14 RX ORDER — SODIUM CHLORIDE 0.9 % (FLUSH) 0.9 %
5-40 SYRINGE (ML) INJECTION PRN
Status: DISCONTINUED | OUTPATIENT
Start: 2025-03-14 | End: 2025-03-20 | Stop reason: HOSPADM

## 2025-03-14 RX ORDER — ONDANSETRON 4 MG/1
4 TABLET, FILM COATED ORAL EVERY 6 HOURS PRN
COMMUNITY

## 2025-03-14 RX ORDER — MELATONIN 10 MG
10 CAPSULE ORAL NIGHTLY PRN
COMMUNITY

## 2025-03-14 RX ORDER — ASPIRIN 81 MG/1
81 TABLET ORAL 2 TIMES DAILY
COMMUNITY

## 2025-03-14 RX ORDER — TAMSULOSIN HYDROCHLORIDE 0.4 MG/1
0.4 CAPSULE ORAL DAILY
Status: DISCONTINUED | OUTPATIENT
Start: 2025-03-14 | End: 2025-03-20 | Stop reason: HOSPADM

## 2025-03-14 RX ORDER — SENNA AND DOCUSATE SODIUM 50; 8.6 MG/1; MG/1
2 TABLET, FILM COATED ORAL 2 TIMES DAILY
Status: DISCONTINUED | OUTPATIENT
Start: 2025-03-14 | End: 2025-03-20 | Stop reason: HOSPADM

## 2025-03-14 RX ORDER — MAGNESIUM SULFATE IN WATER 40 MG/ML
2000 INJECTION, SOLUTION INTRAVENOUS PRN
Status: DISCONTINUED | OUTPATIENT
Start: 2025-03-14 | End: 2025-03-20 | Stop reason: HOSPADM

## 2025-03-14 RX ORDER — ACETAMINOPHEN 325 MG/1
650 TABLET ORAL EVERY 6 HOURS PRN
Status: DISCONTINUED | OUTPATIENT
Start: 2025-03-14 | End: 2025-03-20 | Stop reason: HOSPADM

## 2025-03-14 RX ORDER — LOPERAMIDE HYDROCHLORIDE 2 MG/1
2 CAPSULE ORAL 4 TIMES DAILY PRN
Status: DISCONTINUED | OUTPATIENT
Start: 2025-03-14 | End: 2025-03-20 | Stop reason: HOSPADM

## 2025-03-14 RX ORDER — ASPIRIN 81 MG/1
81 TABLET, CHEWABLE ORAL DAILY
Status: DISCONTINUED | OUTPATIENT
Start: 2025-03-14 | End: 2025-03-20 | Stop reason: HOSPADM

## 2025-03-14 RX ORDER — PROCHLORPERAZINE EDISYLATE 5 MG/ML
5 INJECTION INTRAMUSCULAR; INTRAVENOUS EVERY 6 HOURS PRN
Status: DISCONTINUED | OUTPATIENT
Start: 2025-03-14 | End: 2025-03-20 | Stop reason: HOSPADM

## 2025-03-14 RX ORDER — WARFARIN SODIUM 4 MG/1
4 TABLET ORAL
Status: COMPLETED | OUTPATIENT
Start: 2025-03-14 | End: 2025-03-14

## 2025-03-14 RX ORDER — SODIUM CHLORIDE 9 MG/ML
INJECTION, SOLUTION INTRAVENOUS CONTINUOUS
Status: ACTIVE | OUTPATIENT
Start: 2025-03-14 | End: 2025-03-15

## 2025-03-14 RX ORDER — FUROSEMIDE 40 MG/1
40 TABLET ORAL DAILY
Status: DISCONTINUED | OUTPATIENT
Start: 2025-03-14 | End: 2025-03-20 | Stop reason: HOSPADM

## 2025-03-14 RX ORDER — ACETAMINOPHEN 650 MG/1
650 SUPPOSITORY RECTAL EVERY 6 HOURS PRN
Status: DISCONTINUED | OUTPATIENT
Start: 2025-03-14 | End: 2025-03-20 | Stop reason: HOSPADM

## 2025-03-14 RX ORDER — LACTOBACILLUS RHAMNOSUS GG 10B CELL
2 CAPSULE ORAL DAILY
Status: DISCONTINUED | OUTPATIENT
Start: 2025-03-14 | End: 2025-03-20 | Stop reason: HOSPADM

## 2025-03-14 RX ORDER — WARFARIN SODIUM 4 MG/1
4 TABLET ORAL DAILY
Status: DISCONTINUED | OUTPATIENT
Start: 2025-03-14 | End: 2025-03-14 | Stop reason: DRUGHIGH

## 2025-03-14 RX ORDER — IOPAMIDOL 755 MG/ML
80 INJECTION, SOLUTION INTRAVASCULAR
Status: COMPLETED | OUTPATIENT
Start: 2025-03-14 | End: 2025-03-14

## 2025-03-14 RX ORDER — GLIPIZIDE 5 MG/1
5 TABLET ORAL DAILY
Status: DISCONTINUED | OUTPATIENT
Start: 2025-03-14 | End: 2025-03-20 | Stop reason: HOSPADM

## 2025-03-14 RX ORDER — ACETAMINOPHEN 500 MG
1000 TABLET ORAL EVERY 6 HOURS PRN
COMMUNITY

## 2025-03-14 RX ORDER — SODIUM CHLORIDE 0.9 % (FLUSH) 0.9 %
5-40 SYRINGE (ML) INJECTION EVERY 12 HOURS SCHEDULED
Status: DISCONTINUED | OUTPATIENT
Start: 2025-03-14 | End: 2025-03-20 | Stop reason: HOSPADM

## 2025-03-14 RX ORDER — WARFARIN SODIUM 1 MG/1
3.5 TABLET ORAL SEE ADMIN INSTRUCTIONS
COMMUNITY

## 2025-03-14 RX ORDER — POTASSIUM CHLORIDE 7.45 MG/ML
10 INJECTION INTRAVENOUS PRN
Status: DISCONTINUED | OUTPATIENT
Start: 2025-03-14 | End: 2025-03-20 | Stop reason: HOSPADM

## 2025-03-14 RX ORDER — POTASSIUM CHLORIDE 1500 MG/1
40 TABLET, EXTENDED RELEASE ORAL PRN
Status: DISCONTINUED | OUTPATIENT
Start: 2025-03-14 | End: 2025-03-20 | Stop reason: HOSPADM

## 2025-03-14 RX ADMIN — SODIUM CHLORIDE, PRESERVATIVE FREE 10 ML: 5 INJECTION INTRAVENOUS at 21:03

## 2025-03-14 RX ADMIN — SODIUM CHLORIDE: 9 INJECTION, SOLUTION INTRAVENOUS at 21:07

## 2025-03-14 RX ADMIN — VANCOMYCIN HYDROCHLORIDE 2500 MG: 500 INJECTION, POWDER, LYOPHILIZED, FOR SOLUTION INTRAVENOUS at 02:19

## 2025-03-14 RX ADMIN — IOPAMIDOL 80 ML: 755 INJECTION, SOLUTION INTRAVENOUS at 01:27

## 2025-03-14 RX ADMIN — TAMSULOSIN HYDROCHLORIDE 0.4 MG: 0.4 CAPSULE ORAL at 10:15

## 2025-03-14 RX ADMIN — SODIUM CHLORIDE: 0.9 INJECTION, SOLUTION INTRAVENOUS at 03:20

## 2025-03-14 RX ADMIN — MICONAZOLE NITRATE: 20 POWDER TOPICAL at 21:46

## 2025-03-14 RX ADMIN — ASPIRIN 81 MG CHEWABLE TABLET 81 MG: 81 TABLET CHEWABLE at 10:15

## 2025-03-14 RX ADMIN — PIPERACILLIN AND TAZOBACTAM 4500 MG: 4; .5 INJECTION, POWDER, LYOPHILIZED, FOR SOLUTION INTRAVENOUS; PARENTERAL at 01:38

## 2025-03-14 RX ADMIN — SODIUM CHLORIDE: 9 INJECTION, SOLUTION INTRAVENOUS at 03:36

## 2025-03-14 RX ADMIN — WARFARIN SODIUM 4 MG: 4 TABLET ORAL at 21:03

## 2025-03-14 RX ADMIN — PETROLATUM: 420 OINTMENT TOPICAL at 21:46

## 2025-03-14 NOTE — CONSULTS
Inpatient Cardiology Consultation      Reason for Consult: Elevated troponin    Consulting Physician: Dr. Oakes    Requesting Physician: Dr. Charlton     Date of Consultation: 3/14/2025    History of Present Illness:   Daniel Mcdonald  is a 69 y.o. year old male previously unknown to [facility].  Patient follows with [facility].    Patient presented to the ED on 3/13/2025 via EMS for altered mental status.  He resides in a nursing facility and per staff patient has been having significant amounts of vomiting and diarrhea.  On arrival blood pressure 67/45, heart rate 92.  Per EMS he was 30% on room air.  Initial labs include sodium 139, potassium 5.2, BUN/creatinine 52/1.7, magnesium 1.6, proBNP 2033, troponin 146 > 138, albumin 2.9, WBC 8.3, Hgb 8.5.  UA abnormal.  Blood cultures obtained in the ED.  Chest XR reveals no acute process.  CT of the head negative.  CT abdomen/pelvis revealed circumferential bladder wall thickening may be secondary to under distention or cystitis.  CTA pulmonary reveals no evidence of PE or acute pulmonary abnormality.  ED medications include Zosyn, 2 L normal saline bolus, vancomycin.    Patient is lying in bed in the emergency department appears to be in no acute distress.  Patient reports that over the last 24 hours he has had frequent episodes of vomiting and diarrhea.  He denies any chest pain, palpitations, lightheadedness, dizziness, shortness of breath.  Patient is currently on supplemental O2 but does not report wearing any oxygen at home.  Currently resides in a skilled nursing facility for rehab for an acute left ankle fracture.    Please note: past medical records were reviewed per electronic medical record (EMR) - see detailed reports under Past Medical/ Surgical History.     Past Medical History:    CAD  S/p PCI/ALESSIO to LAD 2015  S/p CABG X2 (LIMA to LAD, SVG to distal RCA) 5/2019  Chronic HFpEF  Severe pulmonary hypertension on TTE

## 2025-03-14 NOTE — ED NOTES
Lab informed this RN unsuccessful at lab draw at this time. Oncoming nurse notified about outstanding PT INR and ABGs.

## 2025-03-14 NOTE — PROGRESS NOTES
4 Eyes Skin Assessment     NAME:  Daniel Mcdonald  YOB: 1955  MEDICAL RECORD NUMBER:  [id number]    The patient is being assessed for  Admission    I agree that at least one RN has performed a thorough Head to Toe Skin Assessment on the patient. ALL assessment sites listed below have been assessed.      Areas assessed by both nurses:    Head, Face, Ears, Shoulders, Back, Chest, Arms, Elbows, Hands, Sacrum. Buttock, Coccyx, Ischium, Legs. Feet and Heels, and Under Medical Devices         Does the Patient have a Wound? Yes wound(s) were present on assessment. LDA wound assessment was Initiated and completed by RN       Lupillo Prevention initiated by RN: Yes  Wound Care Orders initiated by RN: Yes    Pressure Injury (Stage 3,4, Unstageable, DTI, NWPT, and Complex wounds) if present, place Wound referral order by RN under : No    New Ostomies, if present place, Ostomy referral order under : No     Nurse 1 eSignature: Electronically signed by Nilda Larson RN on 3/14/25 at 5:56 PM EDT    **SHARE this note so that the co-signing nurse can place an eSignature**    Nurse 2 eSignature: Electronically signed by Sania Rosales RN on 3/14/25 at 6:23 PM EDT   In 2019. On PPI in past.

## 2025-03-14 NOTE — PLAN OF CARE
Problem: Chronic Conditions and Co-morbidities  Goal: Patient's chronic conditions and co-morbidity symptoms are monitored and maintained or improved  Outcome: Progressing     Problem: Discharge Planning  Goal: Discharge to home or other facility with appropriate resources  Outcome: Progressing  Flowsheets (Taken 3/14/2025 [id number] by Cami Marlow, RN)  Discharge to home or other facility with appropriate resources:   Arrange for needed discharge resources and transportation as appropriate   Identify barriers to discharge with patient and caregiver     Problem: Skin/Tissue Integrity  Goal: Skin integrity remains intact  Description: 1.  Monitor for areas of redness and/or skin breakdown  2.  Assess vascular access sites hourly  3.  Every 4-6 hours minimum:  Change oxygen saturation probe site  4.  Every 4-6 hours:  If on nasal continuous positive airway pressure, respiratory therapy assess nares and determine need for appliance change or resting period  Outcome: Progressing     Problem: Safety - Adult  Goal: Free from fall injury  Outcome: Progressing     Problem: ABCDS Injury Assessment  Goal: Absence of physical injury  Outcome: Progressing

## 2025-03-14 NOTE — PROGRESS NOTES
Visit with PT at bedside discussed kat belief and relationship with God ministered prayer and hope in God.

## 2025-03-14 NOTE — CARE COORDINATION
Social Work /Transition of Care:    Pt presents to the ED secondary to altered mental status from [facility].    Pt is admitted inpatient with acute hypoxic respiratory failure.  Pt is currently on 6L oxygen.  Pt has a cardiology consult.    Per liaison at [facility], pt is not a bed hold but able to return upon discharge.  RICARDO/GUANAKO will follow.

## 2025-03-14 NOTE — PROGRESS NOTES
Pharmacy Consultation Note  (Warfarin Dosing and Monitoring)    Initial consult date: 3-  Consulting Provider: Dr. Zoltan Sanchez JANET Mcdonald is a 69 y.o. male for whom pharmacy has been asked to manage warfarin therapy.     Weight:   Wt Readings from Last 1 Encounters:   03/14/25 (!) 173.7 kg (383 lb)       TSH:    Lab Results   Component Value Date/Time    TSH 1.830 07/27/2020 08:57 AM       Hepatic Function Panel:                            Lab Results   Component Value Date/Time    ALKPHOS 60 03/13/2025 11:15 PM    ALT 9 03/13/2025 11:15 PM    AST 12 03/13/2025 11:15 PM    BILITOT 0.5 03/13/2025 11:15 PM       Current significant warfarin drug-drug interactions include: No significant interactions.    Recent Labs     03/13/25  2315   HGB 8.5*     Date Warfarin Dose INR Heparin or LMWH Comment   3/14 TBD TBD X                                  Assessment:  68 yo/M, admitted from NH for AMS.  On warfarin PTA for Hx of PE (DVT?).    Warfarin dose: 3.5 mg on Tue & Wed only, 4 mg Sun-M-Thu-F-Sat only.    Recently admitted Feb 1-5, 2025 and Pharmacy consulted for warfarin dosing at that time.  On 2-5-2025 Pharmacy note states patient said he takes only 2.5 mg daily.  Goal INR 2 - 3  INR ordered for today but RN's have been unable to obtain a specimen after multiple attempts.    Plan:  No warfarin until INR is reported.  Daily PT/INR until the INR is stable within the therapeutic range.  Pharmacist will follow and monitor/adjust dosing as necessary.    Alexx Raza, PharmD  3/14/2025  12:26 PM   or

## 2025-03-14 NOTE — ED PROVIDER NOTES
Department of Emergency Medicine     Written by: Sky Loyola DO  Patient Name: Daniel Mcdonald  Admit Date: 3/13/2025 10:50 PM  MRN: [id number]                   : 1955      CHIEF COMPLAINT     Chief Complaint   Patient presents with    Altered Mental Status     GI distress today from nursing home, given zofran and found unresponsive pulse ox in 30s     HPI     Daniel Mcdonald is a 69 y.o. male who presents to the emergency department today complaining of altered mental status.  Patient lives at a local nursing home and they called EMS for patient being allegedly unresponsive.  EMS state when they arrived, patient was not unresponsive.  Patient states that over the last 24 hours, he has been having significant amounts of vomiting along with diarrhea.  He states he does not wear any oxygen at the facility and when EMS arrived, they state that the pulse ox was in the 30%.  Patient denies any lightheadedness or dizziness, fever or chills, chest pain, shortness of breath, abdominal pain, hematuria or dysuria, or constipation.    Nursing notes were all reviewed and agreed with or any disagreements were addressed in the HPI.    REVIEW OF SYSTEMS:    Pertinent positives and negatives mentioned in the HPI/MDM.     REVIEW OF OUTSIDE RECORDS: Chart reviewed from 10/5/2025 Hospital discharge summary patient was admitted for anemia.    SOCIAL DETERMINANTS OF HEALTH: Patient lives at a local nursing home.    PAST HISTORY     I personally reviewed the following history:    Past Medical History:  has a past medical history of Arthritis, CAD (coronary artery disease), Cellulitis, Diabetes mellitus (HCC), Hyperlipidemia, Hypertension, Kidney failure, Morbid obesity with body mass index (BMI) of 60.0 to 69.9 in adult, Pulmonary embolism (HCC), and Sleep apnea.    Past Surgical History:  has a past surgical history that includes Throat surgery; Endoscopy, colon, diagnostic; Colonoscopy (2006); Weir tooth  2325 (!) 116/46 -- -- (!) 101 22 98 % --   03/13/25 2249 (!) 67/45 97.8 °F (36.6 °C) Oral 92 -- -- --       PHYSICAL EXAM     Physical Exam  Vitals and nursing note reviewed.   Constitutional:       General: He is not in acute distress.     Appearance: He is morbidly obese. He is ill-appearing and toxic-appearing.      Interventions: Nasal cannula in place.   HENT:      Head: Normocephalic and atraumatic.   Eyes:      Extraocular Movements: Extraocular movements intact.      Pupils: Pupils are equal, round, and reactive to light.   Cardiovascular:      Rate and Rhythm: Normal rate and regular rhythm.   Pulmonary:      Effort: Pulmonary effort is normal.      Breath sounds: Normal breath sounds. No stridor. No wheezing, rhonchi or rales.   Abdominal:      General: Abdomen is flat. There is no distension.      Palpations: Abdomen is soft.      Tenderness: There is no guarding.   Musculoskeletal:         General: No deformity. Normal range of motion.      Cervical back: Normal range of motion.      Comments: Chronic appearing wound to the heel of the left foot.   Skin:     Capillary Refill: Capillary refill takes less than 2 seconds.      Coloration: Skin is not jaundiced.      Findings: No rash.   Neurological:      General: No focal deficit present.      Mental Status: He is oriented to person, place, and time.      Comments: Patient moving all extremities equally.   Psychiatric:         Mood and Affect: Mood normal.         Behavior: Behavior normal.         DIAGNOSTIC RESULTS     I have personally interpreted all laboratory, EKG, and imaging results for this patient. Results are listed below.     LABS:  Labs Reviewed   CBC WITH AUTO DIFFERENTIAL - Abnormal; Notable for the following components:       Result Value    Hemoglobin 8.5 (*)     Hematocrit 31.5 (*)     MCV 64.3 (*)     MCH 17.3 (*)     MCHC 27.0 (*)     RDW 20.2 (*)     Neutrophils % 97 (*)     Lymphocytes % 0 (*)     Monocytes % 1 (*)     Neutrophils

## 2025-03-14 NOTE — H&P
Hospital Medicine History & Physical      PCP: Geoff Kauffman DO    Date of Admission: 3/13/2025    Date of Service: Pt seen/examined on 3/14/25 and Admitted to Inpatient with expected LOS greater than two midnights due to medical therapy.     Chief Complaint:  Altered mental status      History Of Present Illness:      69-year-old male past medical history of PE on Coumadin, non-insulin-dependent diabetes, hypertension, coronary artery disease, sleep apnea, nocturnal oxygen dependent 3 L, CKD, morbid obesity BMI greater than 50 from nursing due to concern for responsiveness.  EMS was called and he was responsive found to be 30% on room air was placed on oxygen.  In ER presentation was hypotensive 67/45 placed on 6 L oxygen.  Chest x-ray without acute findings.  Patient was given IV fluids blood pressure did improve to 111/53.  Troponin of 146 and 138.  proBNP of 2033.  He was given antibiotics with vancomycin and Zosyn given concern for sepsis.  Of note patient has been having diarrhea and nausea for last few days and has been dehydrated.  Urinalysis with evidence of UTI.  CT head with no acute findings.  CT angiogram of the chest was ordered which was negative for pulmonary embolism or acute findings.  CT abdomen pelvis with no acute intra-abdominal abnormality.      Past Medical History:          Diagnosis Date    Arthritis     Hips, possible shoulders and knees    CAD (coronary artery disease)     Cellulitis 11/2016    right leg    Diabetes mellitus (HCC)     Hyperlipidemia     Hypertension     Kidney failure     Morbid obesity with body mass index (BMI) of 60.0 to 69.9 in adult     Pulmonary embolism (HCC)     Sleep apnea        Past Surgical History:          Procedure Laterality Date    ANKLE SURGERY Left 10/16/2024    Left Ankle Irrigation and Debridement with Placement of Left Ankle Spanning External Fixator [place] Jose Morrissey [place] [facility] [place]    CARDIAC SURGERY  05/13/2019    ACB x

## 2025-03-15 LAB
ANION GAP SERPL CALCULATED.3IONS-SCNC: 10 MMOL/L (ref 7–16)
BASOPHILS # BLD: 0.02 K/UL (ref 0–0.2)
BASOPHILS NFR BLD: 0 % (ref 0–2)
BUN SERPL-MCNC: 52 MG/DL (ref 6–23)
CALCIUM SERPL-MCNC: 7.7 MG/DL (ref 8.6–10.2)
CHLORIDE SERPL-SCNC: 110 MMOL/L (ref 98–107)
CO2 SERPL-SCNC: 18 MMOL/L (ref 22–29)
CREAT SERPL-MCNC: 1.9 MG/DL (ref 0.7–1.2)
ECHO AO ASC DIAM: 3.9 CM
ECHO AV AREA PEAK VELOCITY: 2.5 CM2
ECHO AV AREA VTI: 2.7 CM2
ECHO AV CUSP MM: 2.1 CM
ECHO AV MEAN GRADIENT: 3 MMHG
ECHO AV MEAN VELOCITY: 0.9 M/S
ECHO AV PEAK GRADIENT: 6 MMHG
ECHO AV PEAK VELOCITY: 1.2 M/S
ECHO AV VELOCITY RATIO: 0.75
ECHO AV VTI: 21.2 CM
ECHO EST RA PRESSURE: 8 MMHG
ECHO LV EF PHYSICIAN: -6 %
ECHO LV FRACTIONAL SHORTENING: 33 % (ref 28–44)
ECHO LV INTERNAL DIMENSION DIASTOLIC: 4.6 CM (ref 4.2–5.9)
ECHO LV INTERNAL DIMENSION SYSTOLIC: 3.1 CM
ECHO LV ISOVOLUMETRIC RELAXATION TIME (IVRT): 78.4 MS
ECHO LV IVSD: 0.9 CM (ref 0.6–1)
ECHO LV IVSS: 1.2 CM
ECHO LV MASS 2D: 467.5 G (ref 88–224)
ECHO LV POSTERIOR WALL DIASTOLIC: 3.2 CM (ref 0.6–1)
ECHO LV POSTERIOR WALL SYSTOLIC: 1.6 CM
ECHO LV RELATIVE WALL THICKNESS RATIO: 1.39
ECHO LVOT AREA: 3.5 CM2
ECHO LVOT AV VTI INDEX: 0.74
ECHO LVOT DIAM: 2.1 CM
ECHO LVOT MEAN GRADIENT: 2 MMHG
ECHO LVOT PEAK GRADIENT: 3 MMHG
ECHO LVOT PEAK VELOCITY: 0.9 M/S
ECHO LVOT SV: 54.4 ML
ECHO LVOT VTI: 15.7 CM
ECHO MV AREA PHT: 4.7 CM2
ECHO MV AREA VTI: 1.7 CM2
ECHO MV LVOT VTI INDEX: 2.09
ECHO MV MAX VELOCITY: 1.4 M/S
ECHO MV MEAN GRADIENT: 5 MMHG
ECHO MV MEAN VELOCITY: 1 M/S
ECHO MV PEAK GRADIENT: 8 MMHG
ECHO MV PRESSURE HALF TIME (PHT): 46.4 MS
ECHO MV VTI: 32.8 CM
ECHO PV MAX VELOCITY: 1.4 M/S
ECHO PV MEAN GRADIENT: 4 MMHG
ECHO PV MEAN VELOCITY: 1 M/S
ECHO PV PEAK GRADIENT: 7 MMHG
ECHO PV VTI: 26.2 CM
ECHO RV INTERNAL DIMENSION: 3.5 CM
EOSINOPHIL # BLD: 0.05 K/UL (ref 0.05–0.5)
EOSINOPHILS RELATIVE PERCENT: 1 % (ref 0–6)
ERYTHROCYTE [DISTWIDTH] IN BLOOD BY AUTOMATED COUNT: 20 % (ref 11.5–15)
GFR, ESTIMATED: 37 ML/MIN/1.73M2
GLUCOSE SERPL-MCNC: 93 MG/DL (ref 74–99)
HCT VFR BLD AUTO: 32.1 % (ref 37–54)
HGB BLD-MCNC: 8.4 G/DL (ref 12.5–16.5)
IMM GRANULOCYTES # BLD AUTO: 0.05 K/UL (ref 0–0.58)
IMM GRANULOCYTES NFR BLD: 1 % (ref 0–5)
INR PPP: 1.3
LYMPHOCYTES NFR BLD: 0.7 K/UL (ref 1.5–4)
LYMPHOCYTES RELATIVE PERCENT: 16 % (ref 20–42)
MCH RBC QN AUTO: 17.2 PG (ref 26–35)
MCHC RBC AUTO-ENTMCNC: 26.2 G/DL (ref 32–34.5)
MCV RBC AUTO: 65.9 FL (ref 80–99.9)
MICROORGANISM SPEC CULT: NO GROWTH
MONOCYTES NFR BLD: 0.68 K/UL (ref 0.1–0.95)
MONOCYTES NFR BLD: 15 % (ref 2–12)
NEUTROPHILS NFR BLD: 67 % (ref 43–80)
NEUTS SEG NFR BLD: 2.98 K/UL (ref 1.8–7.3)
PLATELET CONFIRMATION: NORMAL
PLATELET, FLUORESCENCE: 204 K/UL (ref 130–450)
PMV BLD AUTO: ABNORMAL FL (ref 7–12)
POTASSIUM SERPL-SCNC: 5 MMOL/L (ref 3.5–5)
PROTHROMBIN TIME: 14.4 SEC (ref 9.3–12.4)
RBC # BLD AUTO: 4.87 M/UL (ref 3.8–5.8)
RBC # BLD: ABNORMAL 10*6/UL
SERVICE CMNT-IMP: NORMAL
SODIUM SERPL-SCNC: 138 MMOL/L (ref 132–146)
SPECIMEN DESCRIPTION: NORMAL
WBC OTHER # BLD: 4.5 K/UL (ref 4.5–11.5)

## 2025-03-15 PROCEDURE — 6370000000 HC RX 637 (ALT 250 FOR IP): Performed by: FAMILY MEDICINE

## 2025-03-15 PROCEDURE — 99232 SBSQ HOSP IP/OBS MODERATE 35: CPT | Performed by: INTERNAL MEDICINE

## 2025-03-15 PROCEDURE — 6370000000 HC RX 637 (ALT 250 FOR IP)

## 2025-03-15 PROCEDURE — 85025 COMPLETE CBC W/AUTO DIFF WBC: CPT

## 2025-03-15 PROCEDURE — 2700000000 HC OXYGEN THERAPY PER DAY

## 2025-03-15 PROCEDURE — 2500000003 HC RX 250 WO HCPCS: Performed by: FAMILY MEDICINE

## 2025-03-15 PROCEDURE — 85610 PROTHROMBIN TIME: CPT

## 2025-03-15 PROCEDURE — 2140000000 HC CCU INTERMEDIATE R&B

## 2025-03-15 PROCEDURE — 99233 SBSQ HOSP IP/OBS HIGH 50: CPT | Performed by: INTERNAL MEDICINE

## 2025-03-15 PROCEDURE — 36415 COLL VENOUS BLD VENIPUNCTURE: CPT

## 2025-03-15 PROCEDURE — 80048 BASIC METABOLIC PNL TOTAL CA: CPT

## 2025-03-15 PROCEDURE — 93306 TTE W/DOPPLER COMPLETE: CPT | Performed by: INTERNAL MEDICINE

## 2025-03-15 RX ORDER — METOPROLOL TARTRATE 25 MG/1
12.5 TABLET, FILM COATED ORAL 2 TIMES DAILY
Status: DISCONTINUED | OUTPATIENT
Start: 2025-03-16 | End: 2025-03-20 | Stop reason: HOSPADM

## 2025-03-15 RX ORDER — WARFARIN SODIUM 4 MG/1
4 TABLET ORAL
Status: COMPLETED | OUTPATIENT
Start: 2025-03-15 | End: 2025-03-15

## 2025-03-15 RX ADMIN — WARFARIN SODIUM 4 MG: 4 TABLET ORAL at 18:46

## 2025-03-15 RX ADMIN — Medication 2 CAPSULE: at 08:51

## 2025-03-15 RX ADMIN — ASPIRIN 81 MG CHEWABLE TABLET 81 MG: 81 TABLET CHEWABLE at 08:51

## 2025-03-15 RX ADMIN — SODIUM CHLORIDE, PRESERVATIVE FREE 10 ML: 5 INJECTION INTRAVENOUS at 08:51

## 2025-03-15 RX ADMIN — MICONAZOLE NITRATE: 20 POWDER TOPICAL at 20:04

## 2025-03-15 RX ADMIN — LOPERAMIDE HYDROCHLORIDE 2 MG: 2 CAPSULE ORAL at 14:55

## 2025-03-15 RX ADMIN — PANTOPRAZOLE SODIUM 20 MG: 20 TABLET, DELAYED RELEASE ORAL at 08:50

## 2025-03-15 RX ADMIN — SODIUM CHLORIDE, PRESERVATIVE FREE 10 ML: 5 INJECTION INTRAVENOUS at 20:08

## 2025-03-15 RX ADMIN — TAMSULOSIN HYDROCHLORIDE 0.4 MG: 0.4 CAPSULE ORAL at 08:51

## 2025-03-15 RX ADMIN — MICONAZOLE NITRATE: 20 POWDER TOPICAL at 08:56

## 2025-03-15 RX ADMIN — LOPERAMIDE HYDROCHLORIDE 2 MG: 2 CAPSULE ORAL at 08:54

## 2025-03-15 RX ADMIN — LOPERAMIDE HYDROCHLORIDE 2 MG: 2 CAPSULE ORAL at 00:10

## 2025-03-15 NOTE — PLAN OF CARE
Problem: Chronic Conditions and Co-morbidities  Goal: Patient's chronic conditions and co-morbidity symptoms are monitored and maintained or improved  3/15/2025 1946 by Zoraida Salguero RN  Outcome: Progressing  3/15/2025 1440 by Brittany Ureña RN  Outcome: Progressing     Problem: Discharge Planning  Goal: Discharge to home or other facility with appropriate resources  3/15/2025 1946 by Zoraida Salguero RN  Outcome: Progressing  3/15/2025 1440 by Brittany Ureña RN  Outcome: Progressing     Problem: Skin/Tissue Integrity  Goal: Skin integrity remains intact  Description: 1.  Monitor for areas of redness and/or skin breakdown  2.  Assess vascular access sites hourly  3.  Every 4-6 hours minimum:  Change oxygen saturation probe site  4.  Every 4-6 hours:  If on nasal continuous positive airway pressure, respiratory therapy assess nares and determine need for appliance change or resting period  3/15/2025 1946 by Zoraida Salguero, RN  Outcome: Progressing  3/15/2025 1440 by Brittany Ureña RN  Outcome: Progressing     Problem: Safety - Adult  Goal: Free from fall injury  3/15/2025 1946 by Zoraida Salguero RN  Outcome: Progressing  3/15/2025 1440 by Brittany Ureña RN  Outcome: Progressing     Problem: ABCDS Injury Assessment  Goal: Absence of physical injury  3/15/2025 1440 by Brittany Ureña RN  Outcome: Progressing

## 2025-03-15 NOTE — CARE COORDINATION
P Quality Flow/Interdisciplinary Rounds Progress Note        Quality Flow Rounds held on March 15, 2025    Disciplines Attending:  Bedside Nurse and Nursing Unit Leadership    Daniel Mcdonald was admitted on 3/13/2025 10:50 PM    Anticipated Discharge Date:       Disposition:    Lupillo Score:  Lupillo Scale Score: 14    [facility] RISK OF UNPLANNED READMISSION 2.0             21 Total Score        Discussed patient goal for the day, patient clinical progression, and barriers to discharge.  The following Goal(s) of the Day/Commitment(s) have been identified:  Diagnostics - Report Results      Liv Goncalves RN  March 15, 2025

## 2025-03-15 NOTE — PROGRESS NOTES
[facility] Hospitalist Progress Note    Admitting Date and Time: 3/13/2025 10:50 PM  Admit Dx: Hypovolemic shock (HCC) [R57.1]  Elevated troponin [R79.89]  Elevated brain natriuretic peptide (BNP) level [R79.89]  Congestive heart failure, unspecified HF chronicity, unspecified heart failure type (HCC) [I50.9]  Acute hypoxic respiratory failure (HCC) [J96.01]    Subjective:  Patient is being followed for Hypovolemic shock (HCC) [R57.1]  Elevated troponin [R79.89]  Elevated brain natriuretic peptide (BNP) level [R79.89]  Congestive heart failure, unspecified HF chronicity, unspecified heart failure type (HCC) [I50.9]  Acute hypoxic respiratory failure (HCC) [J96.01]   Pt feels  much better now.      ROS: denies fever, chills, cp, sob, n/v, HA unless stated above.      warfarin  4 mg Oral Once    sodium chloride flush  5-40 mL IntraVENous 2 times per day    [Held by provider] furosemide  40 mg Oral Daily    [Held by provider] glipiZIDE  5 mg Oral Daily    lactobacillus  2 capsule Oral Daily    [Held by provider] metoprolol tartrate  25 mg Oral BID    pantoprazole  20 mg Oral Daily    [Held by provider] sennosides-docusate sodium  2 tablet Oral BID    tamsulosin  0.4 mg Oral Daily    aspirin  81 mg Oral Daily    warfarin placeholder: dosing by pharmacy   Oral RX Placeholder    miconazole   Topical BID     sodium chloride flush, 5-40 mL, PRN  sodium chloride, , PRN  potassium chloride, 40 mEq, PRN   Or  potassium alternative oral replacement, 40 mEq, PRN   Or  potassium chloride, 10 mEq, PRN  magnesium sulfate, 2,000 mg, PRN  polyethylene glycol, 17 g, Daily PRN  acetaminophen, 650 mg, Q6H PRN   Or  acetaminophen, 650 mg, Q6H PRN  prochlorperazine, 5 mg, Q6H PRN  sulfur hexafluoride microspheres, 2 mL, ONCE PRN  white petrolatum, , BID PRN  loperamide, 2 mg, 4x Daily PRN         Objective:    BP (!) 117/41   Pulse 82   Temp 98.4 °F (36.9 °C) (Temporal)   Resp 16   Wt (!) 173.7 kg (383 lb)   SpO2 99%   BMI

## 2025-03-15 NOTE — PROGRESS NOTES
Pharmacy Consultation Note  (Warfarin Dosing and Monitoring)    Initial consult date: 3-  Consulting Provider: Dr. Zoltan Sanchez JANET Mcdonald is a 69 y.o. male for whom pharmacy has been asked to manage warfarin therapy.     Weight:   Wt Readings from Last 1 Encounters:   03/14/25 (!) 173.7 kg (383 lb)       TSH:    Lab Results   Component Value Date/Time    TSH 1.830 07/27/2020 08:57 AM       Hepatic Function Panel:                            Lab Results   Component Value Date/Time    ALKPHOS 60 03/13/2025 11:15 PM    ALT 9 03/13/2025 11:15 PM    AST 12 03/13/2025 11:15 PM    BILITOT 0.5 03/13/2025 11:15 PM       Current significant warfarin drug-drug interactions include: No significant interactions.    Recent Labs     03/13/25  2315   HGB 8.5*     Date Warfarin Dose INR Heparin or LMWH Comment   3/14 TBD TBD X    3/15 < 4 mg > 1.3                            Assessment:  68 yo/M, admitted from NH for AMS.  On warfarin PTA for Hx of PE (DVT?).    Warfarin dose: 3.5 mg on Tue & Wed only, 4 mg Sun-M-Thu-F-Sat only.    Recently admitted Feb 1-5, 2025 and Pharmacy consulted for warfarin dosing at that time.  On 2-5-2025 Pharmacy note states patient said he takes only 2.5 mg daily.  Goal INR 2 - 3  INR today - 1.3    Plan:  Warfarin 4 mg x 1 tonight  Daily PT/INR until the INR is stable within the therapeutic range.  Pharmacist will follow and monitor/adjust dosing as necessary.    Maximus Lares, PharmD  PGY-1 Pharmacy Practice Resident, [phone]  3/15/2025 8:27 AM

## 2025-03-15 NOTE — PROGRESS NOTES
Multiple attempts made by day shift staff to obtain bariatric bed, which was ordered on 3/14. Bed still has not arrived. Low air loss module applied to standard bed at this time. Will continue to educate patient on turning and repositioning. Patient declines at this time.Zoraida Salguero RN

## 2025-03-15 NOTE — PROGRESS NOTES
Inpatient Cardiology Progress note        SUBJECTIVE/OBJECTIVE:       No chest pain or shortness of breath.              PHYSICAL EXAM:   /60   Pulse 92   Temp 98.6 °F (37 °C) (Temporal)   Resp 20   Wt (!) 173.7 kg (383 lb)   SpO2 96%   BMI 54.95 kg/m²    B/P Range last 24 hours: Systolic (24hrs), Av , Min:109 , Max:124    Diastolic (24hrs), Av, Min:40, Max:60      GENERAL: Not in distress.   HEAD: Normocephalic, Atraumatic.   NECK: No JVD. No carotid bruits. No neck masses.?   CARDIOVASCULAR: Normal rate, regular rhythm, normal S1, S2, no MRG    LUNGS: Clear to auscultation bilaterally, no wheezing.?   ABDOMEN: Abdomen is soft and not distended. No tenderness.  EXTREMITIES:There is mild pedal edema.   MUSCULOSKELETAL: No joint swelling. Normal range of motion?   SKIN: Skins is moist. No ulcers or lesions.   NEUROLOGICAL: Conscious, alert, oriented to time, place and person. No evidence of obvious neurological deficits.?   PSYCHOLOGICAL: Patient is in normal mood.?       Intake/Output Summary (Last 24 hours) at 3/15/2025 2205  Last data filed at 3/15/2025 1307  Gross per 24 hour   Intake 240 ml   Output 850 ml   Net -610 ml       Weight:   Wt Readings from Last 3 Encounters:   25 (!) 173.7 kg (383 lb)   25 (!) 152 kg (335 lb)   10/16/24 (!) 174.9 kg (385 lb 9.4 oz)       Current Inpatient Medications:   sodium chloride flush  5-40 mL IntraVENous 2 times per day    [Held by provider] furosemide  40 mg Oral Daily    [Held by provider] glipiZIDE  5 mg Oral Daily    lactobacillus  2 capsule Oral Daily    [Held by provider] metoprolol tartrate  25 mg Oral BID    pantoprazole  20 mg Oral Daily    [Held by provider] sennosides-docusate sodium  2 tablet Oral BID    tamsulosin  0.4 mg Oral Daily    aspirin  81 mg Oral Daily    warfarin placeholder: dosing by pharmacy   Oral RX Placeholder    miconazole   Topical BID       IV Infusions (if any):   sodium chloride         DIAGNOSTIC/  1/8/2024, morbid obesity, SUKHWINDER who presented to the hospital with altered mental status and was found to have hypotension on admission mantra for elevation, hence cardiology consulted.    Asymptomatic troponin elevation:  146-138  Patient with history of chronically elevated troponins in the 90s.  He is asymptomatic with no chest pain or shortness of breath.  His echocardiogram shows normal ejection fraction with no regional motion normalities  His BP improved  No further cardiac testing unless patient comes symptomatic  CAD:  s/p PCI with ALESSIO to LAD in 2015, s/p CABG x 2 LIMA-LAD, SVG-distal RCA 5/2019  Resume metoprolol at a lower dose of 12.5 mg twice daily, consider increasing dose if BP tolerates  Continue aspirin 81 mg daily  He had allergy to rosuvastatin, can consider other statins if patient able to tolerate, will defer that to primary cardiologist  Hx of DVT/PE:  On warfarin    Cardiology service to sign off.  Please call back with questions or concerns    Mulugeta Garcia MD  Interventional Cardiology/ Structural heart disease

## 2025-03-16 LAB
ANION GAP SERPL CALCULATED.3IONS-SCNC: 11 MMOL/L (ref 7–16)
BASOPHILS # BLD: 0.02 K/UL (ref 0–0.2)
BASOPHILS NFR BLD: 0 % (ref 0–2)
BUN SERPL-MCNC: 54 MG/DL (ref 6–23)
CALCIUM SERPL-MCNC: 7.9 MG/DL (ref 8.6–10.2)
CHLORIDE SERPL-SCNC: 111 MMOL/L (ref 98–107)
CO2 SERPL-SCNC: 18 MMOL/L (ref 22–29)
CREAT SERPL-MCNC: 2.1 MG/DL (ref 0.7–1.2)
EOSINOPHIL # BLD: 0.1 K/UL (ref 0.05–0.5)
EOSINOPHILS RELATIVE PERCENT: 2 % (ref 0–6)
ERYTHROCYTE [DISTWIDTH] IN BLOOD BY AUTOMATED COUNT: 19.9 % (ref 11.5–15)
GFR, ESTIMATED: 33 ML/MIN/1.73M2
GLUCOSE SERPL-MCNC: 117 MG/DL (ref 74–99)
HCT VFR BLD AUTO: 30.7 % (ref 37–54)
HGB BLD-MCNC: 8 G/DL (ref 12.5–16.5)
IMM GRANULOCYTES # BLD AUTO: 0.05 K/UL (ref 0–0.58)
IMM GRANULOCYTES NFR BLD: 1 % (ref 0–5)
INR PPP: 1.4
LYMPHOCYTES NFR BLD: 0.87 K/UL (ref 1.5–4)
LYMPHOCYTES RELATIVE PERCENT: 19 % (ref 20–42)
MCH RBC QN AUTO: 17 PG (ref 26–35)
MCHC RBC AUTO-ENTMCNC: 26.1 G/DL (ref 32–34.5)
MCV RBC AUTO: 65.3 FL (ref 80–99.9)
MONOCYTES NFR BLD: 0.46 K/UL (ref 0.1–0.95)
MONOCYTES NFR BLD: 10 % (ref 2–12)
NEUTROPHILS NFR BLD: 67 % (ref 43–80)
NEUTS SEG NFR BLD: 3.09 K/UL (ref 1.8–7.3)
PLATELET, FLUORESCENCE: 220 K/UL (ref 130–450)
PMV BLD AUTO: ABNORMAL FL (ref 7–12)
POTASSIUM SERPL-SCNC: 4.7 MMOL/L (ref 3.5–5)
PROTHROMBIN TIME: 15.4 SEC (ref 9.3–12.4)
RBC # BLD AUTO: 4.7 M/UL (ref 3.8–5.8)
RBC # BLD: ABNORMAL 10*6/UL
SODIUM SERPL-SCNC: 140 MMOL/L (ref 132–146)
WBC OTHER # BLD: 4.6 K/UL (ref 4.5–11.5)

## 2025-03-16 PROCEDURE — 80048 BASIC METABOLIC PNL TOTAL CA: CPT

## 2025-03-16 PROCEDURE — 36415 COLL VENOUS BLD VENIPUNCTURE: CPT

## 2025-03-16 PROCEDURE — 99232 SBSQ HOSP IP/OBS MODERATE 35: CPT | Performed by: INTERNAL MEDICINE

## 2025-03-16 PROCEDURE — 2140000000 HC CCU INTERMEDIATE R&B

## 2025-03-16 PROCEDURE — 6370000000 HC RX 637 (ALT 250 FOR IP)

## 2025-03-16 PROCEDURE — 85025 COMPLETE CBC W/AUTO DIFF WBC: CPT

## 2025-03-16 PROCEDURE — 2700000000 HC OXYGEN THERAPY PER DAY

## 2025-03-16 PROCEDURE — 85610 PROTHROMBIN TIME: CPT

## 2025-03-16 PROCEDURE — 2500000003 HC RX 250 WO HCPCS: Performed by: FAMILY MEDICINE

## 2025-03-16 PROCEDURE — 6370000000 HC RX 637 (ALT 250 FOR IP): Performed by: FAMILY MEDICINE

## 2025-03-16 RX ORDER — WARFARIN SODIUM 5 MG/1
5 TABLET ORAL
Status: COMPLETED | OUTPATIENT
Start: 2025-03-16 | End: 2025-03-16

## 2025-03-16 RX ADMIN — MICONAZOLE NITRATE: 20 POWDER TOPICAL at 08:58

## 2025-03-16 RX ADMIN — SODIUM CHLORIDE, PRESERVATIVE FREE 10 ML: 5 INJECTION INTRAVENOUS at 08:12

## 2025-03-16 RX ADMIN — PANTOPRAZOLE SODIUM 20 MG: 20 TABLET, DELAYED RELEASE ORAL at 08:12

## 2025-03-16 RX ADMIN — WARFARIN SODIUM 5 MG: 5 TABLET ORAL at 17:23

## 2025-03-16 RX ADMIN — SODIUM CHLORIDE, PRESERVATIVE FREE 10 ML: 5 INJECTION INTRAVENOUS at 21:15

## 2025-03-16 RX ADMIN — Medication 2 CAPSULE: at 08:12

## 2025-03-16 RX ADMIN — ASPIRIN 81 MG CHEWABLE TABLET 81 MG: 81 TABLET CHEWABLE at 08:12

## 2025-03-16 RX ADMIN — MICONAZOLE NITRATE: 20 POWDER TOPICAL at 21:15

## 2025-03-16 RX ADMIN — TAMSULOSIN HYDROCHLORIDE 0.4 MG: 0.4 CAPSULE ORAL at 08:12

## 2025-03-16 NOTE — PLAN OF CARE
Problem: Chronic Conditions and Co-morbidities  Goal: Patient's chronic conditions and co-morbidity symptoms are monitored and maintained or improved  3/16/2025 1933 by Zoraida Salguero RN  Outcome: Progressing  3/16/2025 1039 by Nilda Larson RN  Outcome: Progressing     Problem: Discharge Planning  Goal: Discharge to home or other facility with appropriate resources  3/16/2025 1933 by Zoraida Salguero, RN  Outcome: Progressing  3/16/2025 1039 by Nilda Larson RN  Outcome: Progressing     Problem: Skin/Tissue Integrity  Goal: Skin integrity remains intact  Description: 1.  Monitor for areas of redness and/or skin breakdown  2.  Assess vascular access sites hourly  3.  Every 4-6 hours minimum:  Change oxygen saturation probe site  4.  Every 4-6 hours:  If on nasal continuous positive airway pressure, respiratory therapy assess nares and determine need for appliance change or resting period  3/16/2025 1933 by Zoraida Salguero, RN  Outcome: Progressing  3/16/2025 1039 by Nilda Larson RN  Outcome: Progressing     Problem: ABCDS Injury Assessment  Goal: Absence of physical injury  3/16/2025 1933 by Zoraida Salguero RN  Outcome: Progressing  3/16/2025 1039 by Nilda Larson RN  Outcome: Progressing     Problem: Nutrition Deficit:  Goal: Optimize nutritional status  3/16/2025 1933 by Zoraida Salguero, RN  Outcome: Progressing  3/16/2025 1039 by Nilda Larson RN  Outcome: Progressing

## 2025-03-16 NOTE — PROGRESS NOTES
Comprehensive Nutrition Assessment    Type and Reason for Visit:  Initial, Wound, Consult    Nutrition Recommendations/Plan:   Recommend and start Glucerna supplement TID and Fareed wound healing supplement BID to help meet increased nutritional needs from wound healing.           Malnutrition Assessment:  Malnutrition Status:  At risk for malnutrition (03/16/25 1033)    Context:  Acute Illness     Findings of the 6 clinical characteristics of malnutrition:  Energy Intake:  Mild decrease in energy intake (since admission)  Weight Loss:  Unable to assess (d/t possible fluid shifts ; hx of CHF)     Body Fat Loss:  Unable to assess     Muscle Mass Loss:  Unable to assess    Fluid Accumulation:  No fluid accumulation     Strength:  Not Performed    Nutrition Assessment:    Patient adm from nursing facility w/ AMS ; noted congestive heart failure and acute hypoxic respiratory failure ; also adm w/ hypovolemic shock ; hx of DM/CHF/CKD/TIA ; hx of CAD s/p CABG x 2  ; wound noted ; will provide recommendations    Nutrition Related Findings:    I&Os WNL, 1+ edema, A&O x 4, hyperactive BS, diarrhea, redness to buttocks/heels ; Wound Type: Open Wounds, Wound Consult Pending (wound x 1)       Current Nutrition Intake & Therapies:    Average Meal Intake: 26-50%, 51-75% (~50%)     ADULT DIET; Regular; 5 carb choices (75 gm/meal); Low Sodium (2 gm)    Anthropometric Measures:  Height: 177.8 cm (5' 10\")  Ideal Body Weight (IBW): 166 lbs (75 kg)       Current Body Weight: 173.7 kg (383 lb) (3/14, no method), 230.7 % IBW.    Current BMI (kg/m2): 55  Usual Body Weight:  (UTO ; EMR shows past weights of 335# bedscale on 2/3/25 and 365# actual on 2/1/25 and 385# actual on 10/16/24)                          BMI Categories: Obese Class 3 (BMI 40.0 or greater)    Estimated Daily Nutrient Needs:  Energy Requirements Based On: Formula  Weight Used for Energy Requirements: Current  Energy (kcal/day): 6816-1823 (REE 2507 x 1.2 SF)  Weight  Used for Protein Requirements: Ideal  Protein (g/day): 150-170 (2.0-2.2g/kg IBW)  Method Used for Fluid Requirements: 1 ml/kcal  Fluid (ml/day): 3099-8591    Nutrition Diagnosis:   Increased nutrient needs related to increase demand for energy/nutrients as evidenced by wounds    Nutrition Interventions:   Food and/or Nutrient Delivery: Continue Current Diet, Start Oral Nutrition Supplement  Nutrition Education/Counseling: Education/Counseling not indicated  Coordination of Nutrition Care: Continue to monitor while inpatient       Goals:  Goals: Meet at least 75% of estimated needs, by next RD assessment  Type of Goal: New goal  Previous Goal Met: New Goal    Nutrition Monitoring and Evaluation:   Behavioral-Environmental Outcomes: None Identified  Food/Nutrient Intake Outcomes: Food and Nutrient Intake, Supplement Intake  Physical Signs/Symptoms Outcomes: Chewing or Swallowing, Biochemical Data, GI Status, Hemodynamic Status, Meal Time Behavior, Fluid Status or Edema, Skin, Weight, Nutrition Focused Physical Findings, Diarrhea    Discharge Planning:    Continue Oral Nutrition Supplement     Peña Bernal RD, LD  Contact: [phone]

## 2025-03-16 NOTE — PROGRESS NOTES
Pharmacy Consultation Note  (Warfarin Dosing and Monitoring)    Initial consult date: 3-  Consulting Provider: Dr. Zoltan Sanchez JANET Mcdonald is a 69 y.o. male for whom pharmacy has been asked to manage warfarin therapy.     Weight:   Wt Readings from Last 1 Encounters:   03/14/25 (!) 173.7 kg (383 lb)       TSH:    Lab Results   Component Value Date/Time    TSH 1.830 07/27/2020 08:57 AM       Hepatic Function Panel:                            Lab Results   Component Value Date/Time    ALKPHOS 60 03/13/2025 11:15 PM    ALT 9 03/13/2025 11:15 PM    AST 12 03/13/2025 11:15 PM    BILITOT 0.5 03/13/2025 11:15 PM       Current significant warfarin drug-drug interactions include: No significant interactions.    Recent Labs     03/13/25  2315 03/15/25  0705 03/16/25  0606   HGB 8.5* 8.4* 8.0*     Date Warfarin Dose INR Heparin or LMWH Comment   3/14 4 mg 1.2 X    3/15 4 mg  1.3 X    3/16 5 mg 1.4 X                    Assessment:  70 yo/M, admitted from NH for AMS.  On warfarin PTA for Hx of PE (DVT?).    Warfarin dose: 3.5 mg on Tue & Wed only, 4 mg Sun-M-Thu-F-Sat only.    Recently admitted Feb 1-5, 2025 and Pharmacy consulted for warfarin dosing at that time.  On 2-5-2025 Pharmacy note states patient said he takes only 2.5 mg daily.  Goal INR 2 - 3    Plan:  Warfarin 5 mg x 1 tonight  Daily PT/INR until the INR is stable within the therapeutic range.  Pharmacist will follow and monitor/adjust dosing as necessary.    Tanesha Alva, PharmD  PGY1 Pharmacy Practice Resident [phone]  3/16/2025 8:36 AM

## 2025-03-16 NOTE — PROGRESS NOTES
Spoke with evan a third time at 1350. The order was done under the wrong account and is now being reordered STAT under the correct account. Waiting a call back with ETA. ETA is 1320

## 2025-03-16 NOTE — PROGRESS NOTES
[facility] Hospitalist Progress Note    Admitting Date and Time: 3/13/2025 10:50 PM  Admit Dx: Hypovolemic shock (HCC) [R57.1]  Elevated troponin [R79.89]  Elevated brain natriuretic peptide (BNP) level [R79.89]  Congestive heart failure, unspecified HF chronicity, unspecified heart failure type (HCC) [I50.9]  Acute hypoxic respiratory failure (HCC) [J96.01]    Subjective:  Patient is being followed for Hypovolemic shock (HCC) [R57.1]  Elevated troponin [R79.89]  Elevated brain natriuretic peptide (BNP) level [R79.89]  Congestive heart failure, unspecified HF chronicity, unspecified heart failure type (HCC) [I50.9]  Acute hypoxic respiratory failure (HCC) [J96.01]   Pt feels  much better now.      ROS: denies fever, chills, cp, sob, n/v, HA unless stated above.      warfarin  5 mg Oral Once    [Held by provider] metoprolol tartrate  12.5 mg Oral BID    sodium chloride flush  5-40 mL IntraVENous 2 times per day    [Held by provider] furosemide  40 mg Oral Daily    [Held by provider] glipiZIDE  5 mg Oral Daily    lactobacillus  2 capsule Oral Daily    pantoprazole  20 mg Oral Daily    [Held by provider] sennosides-docusate sodium  2 tablet Oral BID    tamsulosin  0.4 mg Oral Daily    aspirin  81 mg Oral Daily    warfarin placeholder: dosing by pharmacy   Oral RX Placeholder    miconazole   Topical BID     sodium chloride flush, 5-40 mL, PRN  sodium chloride, , PRN  potassium chloride, 40 mEq, PRN   Or  potassium alternative oral replacement, 40 mEq, PRN   Or  potassium chloride, 10 mEq, PRN  magnesium sulfate, 2,000 mg, PRN  polyethylene glycol, 17 g, Daily PRN  acetaminophen, 650 mg, Q6H PRN   Or  acetaminophen, 650 mg, Q6H PRN  prochlorperazine, 5 mg, Q6H PRN  sulfur hexafluoride microspheres, 2 mL, ONCE PRN  white petrolatum, , BID PRN  loperamide, 2 mg, 4x Daily PRN         Objective:    BP (!) 117/50   Pulse 94   Temp 98.8 °F (37.1 °C) (Temporal)   Resp 20   Ht 1.778 m (5' 10\")   Wt (!) 173.7 kg  accuracy; however, inadvertent computerized transcription errors may be present.  Electronically signed by VERONICA FULLER MD on 3/16/2025 at 12:17 PM

## 2025-03-16 NOTE — PROGRESS NOTES
Call placed to [facility] re: patient's bariatric bed. Spoke with Ama. Per Ama, no order was previously received. Patient information provided. Per Ama, she will have  call me with an ETA.Zoraida Salguero RN      Confirmation #: [id number]

## 2025-03-16 NOTE — CARE COORDINATION
[facility] Quality Flow/Interdisciplinary Rounds Progress Note        Quality Flow Rounds held on March 16, 2025    Disciplines Attending:  Bedside Nurse and Nursing Unit Leadership    Daniel Mcdonald was admitted on 3/13/2025 10:50 PM    Anticipated Discharge Date:       Disposition:    Lupillo Score:  Lupillo Scale Score: 14    [facility] RISK OF UNPLANNED READMISSION 2.0             22.2 Total Score        Discussed patient goal for the day, patient clinical progression, and barriers to discharge.  The following Goal(s) of the Day/Commitment(s) have been identified:  Diagnostics - Report Results      Liv Goncalves RN  March 16, 2025

## 2025-03-16 NOTE — PROGRESS NOTES
Call placed to [facility] to see if they had an ETA on bed yet. Per dispatch, we are looking at an ETA of 10-11am.     Patient updated. Patient still refusing heel boots/protectors as well as wedges or pillows, despite c/o of buttock and posterior leg soreness. I informed patient that we could try to turn/offload for short periods of time to which he replied \"no, but thank you\". Will continue to educate patient.Zoraida Salguero RN

## 2025-03-16 NOTE — PROGRESS NOTES
Call placed to [facility] at 1140, bed was supposed to arrive at 1100. Call center was to have technician call the unit and give an ETA, no call was ever received.     Second call placed at 1240, call center stated that the order was cancelled and no reason was able to be given. Call center is to call technician and get answer and call the unit with an answer, awaiting return call.

## 2025-03-17 LAB
INR PPP: 1.9
PROTHROMBIN TIME: 20.3 SEC (ref 9.3–12.4)

## 2025-03-17 PROCEDURE — 97161 PT EVAL LOW COMPLEX 20 MIN: CPT

## 2025-03-17 PROCEDURE — 2700000000 HC OXYGEN THERAPY PER DAY

## 2025-03-17 PROCEDURE — 2140000000 HC CCU INTERMEDIATE R&B

## 2025-03-17 PROCEDURE — 97535 SELF CARE MNGMENT TRAINING: CPT

## 2025-03-17 PROCEDURE — 6370000000 HC RX 637 (ALT 250 FOR IP): Performed by: FAMILY MEDICINE

## 2025-03-17 PROCEDURE — 6370000000 HC RX 637 (ALT 250 FOR IP)

## 2025-03-17 PROCEDURE — 97165 OT EVAL LOW COMPLEX 30 MIN: CPT

## 2025-03-17 PROCEDURE — 2500000003 HC RX 250 WO HCPCS: Performed by: FAMILY MEDICINE

## 2025-03-17 PROCEDURE — 85610 PROTHROMBIN TIME: CPT

## 2025-03-17 PROCEDURE — 2500000003 HC RX 250 WO HCPCS: Performed by: INTERNAL MEDICINE

## 2025-03-17 PROCEDURE — 99232 SBSQ HOSP IP/OBS MODERATE 35: CPT | Performed by: INTERNAL MEDICINE

## 2025-03-17 PROCEDURE — 97530 THERAPEUTIC ACTIVITIES: CPT

## 2025-03-17 PROCEDURE — 36415 COLL VENOUS BLD VENIPUNCTURE: CPT

## 2025-03-17 RX ORDER — WARFARIN SODIUM 4 MG/1
4 TABLET ORAL
Status: COMPLETED | OUTPATIENT
Start: 2025-03-17 | End: 2025-03-17

## 2025-03-17 RX ADMIN — SODIUM CHLORIDE, PRESERVATIVE FREE 10 ML: 5 INJECTION INTRAVENOUS at 20:12

## 2025-03-17 RX ADMIN — ASPIRIN 81 MG CHEWABLE TABLET 81 MG: 81 TABLET CHEWABLE at 08:21

## 2025-03-17 RX ADMIN — Medication 2 CAPSULE: at 08:20

## 2025-03-17 RX ADMIN — TAMSULOSIN HYDROCHLORIDE 0.4 MG: 0.4 CAPSULE ORAL at 08:21

## 2025-03-17 RX ADMIN — WARFARIN SODIUM 4 MG: 4 TABLET ORAL at 19:10

## 2025-03-17 RX ADMIN — MICONAZOLE NITRATE: 20 POWDER TOPICAL at 08:23

## 2025-03-17 RX ADMIN — SODIUM CHLORIDE, PRESERVATIVE FREE 10 ML: 5 INJECTION INTRAVENOUS at 08:21

## 2025-03-17 RX ADMIN — PANTOPRAZOLE SODIUM 20 MG: 20 TABLET, DELAYED RELEASE ORAL at 08:21

## 2025-03-17 RX ADMIN — MICONAZOLE NITRATE: 20 POWDER TOPICAL at 20:12

## 2025-03-17 NOTE — PATIENT CARE CONFERENCE
P Quality Flow/Interdisciplinary Rounds Progress Note        Quality Flow Rounds held on March 17, 2025    Disciplines Attending:  Bedside Nurse, , , and Nursing Unit Leadership    Daniel Mcdonald was admitted on 3/13/2025 10:50 PM    Anticipated Discharge Date:       Disposition:    Lupillo Score:  Lupillo Scale Score: 14    [facility] RISK OF UNPLANNED READMISSION 2.0             20.9 Total Score        Discussed patient goal for the day, patient clinical progression, and barriers to discharge.  The following Goal(s) of the Day/Commitment(s) have been identified:  downgrade/discharge       Juana Mckeon RN  March 17, 2025

## 2025-03-17 NOTE — PROGRESS NOTES
Continued to educate patient on the importance of turning and repositioning, even with bariatric bed in place. Patient still only allowing to be pulled up in bed only.Zoraida Salguero RN

## 2025-03-17 NOTE — PROGRESS NOTES
Occupational Therapy  OCCUPATIONAL THERAPY INITIAL EVALUATION    [facility]  [address], [place]      Date:3/17/2025                                                Patient Name: Daniel Mcdonald  MRN: [id number]  : 1955  Room: [address] [place] [address]    Evaluating OT: Martín Moreno OTR/L #[id number]     Referring Provider: Nahun Charlton MD   Specific Provider Orders/Date: OT eval and treat 3/14/25    Diagnosis: Hypovolemic shock (HCC) [R57.1]  Elevated troponin [R79.89]  Elevated brain natriuretic peptide (BNP) level [R79.89]  Congestive heart failure, unspecified HF chronicity, unspecified heart failure type (HCC) [I50.9]  Acute hypoxic respiratory failure (HCC) [J96.01]   Pt admitted to hospital with AMS and respiratory failure from rehab facility. Pt with history of L ankle fx (WBAT in boot per 25 ortho note)    Pertinent Medical History:  has a past medical history of Arthritis, CAD (coronary artery disease), Cellulitis, Diabetes mellitus (HCC), Hyperlipidemia, Hypertension, Kidney failure, Morbid obesity with body mass index (BMI) of 60.0 to 69.9 in adult, Pulmonary embolism (HCC), and Sleep apnea.       Precautions:  Fall Risk, WBAT R LE in CAM boot, bariatric bed, O2, L UE weakness    Assessment of current deficits    [x] Functional mobility  [x]ADLs  [x] Strength               []Cognition    [x] Functional transfers   [x] IADLs         [x] Safety Awareness   [x]Endurance    [] Fine Coordination              [x] Balance      [] Vision/perception   []Sensation     []Gross Motor Coordination  [] ROM  [] Delirium                   [] Motor Control     OT PLAN OF CARE   OT POC based on physician orders, patient diagnosis and results of clinical assessment    Frequency/Duration 1-5 days/wk for 2 weeks PRN   Specific OT Treatment Interventions to include:   * Instruction/training on adapted ADL techniques and AE recommendations to increase

## 2025-03-17 NOTE — PROGRESS NOTES
Physical Therapy  Initial Assessment     Name: Daniel Mcdonald  : 1955  MRN: [id number]      Date of Service: 3/17/2025    Evaluating PT: Victor Manuel Troy, PT, DPT [id number]      Room #:  6505/6505-A  Diagnosis:  Hypovolemic shock (HCC) [R57.1]  Elevated troponin [R79.89]  Elevated brain natriuretic peptide (BNP) level [R79.89]  Congestive heart failure, unspecified HF chronicity, unspecified heart failure type (HCC) [I50.9]  Acute hypoxic respiratory failure (HCC) [J96.01]  PMHx/PSHx:   has a past medical history of Arthritis, CAD (coronary artery disease), Cellulitis, Diabetes mellitus (HCC), Hyperlipidemia, Hypertension, Kidney failure, Morbid obesity with body mass index (BMI) of 60.0 to 69.9 in adult, Pulmonary embolism (HCC), and Sleep apnea.  Procedure/Surgery:  NA  Precautions:  Fall risk, Hx of L bimalleolar fx, WBAT LLE in CAM boot (per ortho note on ), Bariatric bed, O2  Equipment Needs:  NA    SUBJECTIVE:    Pt was admitted from [facility]. Pt was using mike lift for transfers to/from . Pt was standing with PT at parallel bars for ~10 seconds and has been unable to take steps yet.    OBJECTIVE:   Initial Evaluation  Date: 3/17/25 Treatment Date: Short Term/ Long Term   Goals   AM-PAC 6 Clicks      Was pt agreeable to Eval/treatment? Yes      Does pt have pain? Mild L ankle pain     Bed Mobility  Rolling: MaxA to L, Dependent to R  Supine to sit: MaxA x2  Sit to supine: MaxA x2  Scooting: MaxA x2 to EOB  Rolling: Cong to L, ModA to R  Supine to sit: ModA  Sit to supine: ModA  Scooting: ModA   Transfers Sit to stand: NT  Stand to sit: NT  Stand pivot: NT  Sit to stand: ModA x2  Stand to sit: ModA x2  Stand pivot: MaxA x2 with bariatric WW   Ambulation   NT  >5 feet with bariatric WW with MaxA x2   Stair negotiation: ascended and descended NT  NA   ROM BUE: Refer to OT note  BLE: Limited by pain     Strength BUE: Refer to OT note  BLE: NT     Balance Sitting EOB: SBA  Dynamic Standing: NT

## 2025-03-17 NOTE — PROGRESS NOTES
[facility] Hospitalist Progress Note    Admitting Date and Time: 3/13/2025 10:50 PM  Admit Dx: Hypovolemic shock (HCC) [R57.1]  Elevated troponin [R79.89]  Elevated brain natriuretic peptide (BNP) level [R79.89]  Congestive heart failure, unspecified HF chronicity, unspecified heart failure type (HCC) [I50.9]  Acute hypoxic respiratory failure (HCC) [J96.01]    Subjective:  Patient is being followed for Hypovolemic shock (HCC) [R57.1]  Elevated troponin [R79.89]  Elevated brain natriuretic peptide (BNP) level [R79.89]  Congestive heart failure, unspecified HF chronicity, unspecified heart failure type (HCC) [I50.9]  Acute hypoxic respiratory failure (HCC) [J96.01]   Pt feels  much better now.      ROS: denies fever, chills, cp, sob, n/v, HA unless stated above.      warfarin  4 mg Oral Once    [Held by provider] metoprolol tartrate  12.5 mg Oral BID    sodium chloride flush  5-40 mL IntraVENous 2 times per day    [Held by provider] furosemide  40 mg Oral Daily    [Held by provider] glipiZIDE  5 mg Oral Daily    lactobacillus  2 capsule Oral Daily    pantoprazole  20 mg Oral Daily    [Held by provider] sennosides-docusate sodium  2 tablet Oral BID    tamsulosin  0.4 mg Oral Daily    aspirin  81 mg Oral Daily    warfarin placeholder: dosing by pharmacy   Oral RX Placeholder    miconazole   Topical BID     sodium chloride flush, 5-40 mL, PRN  sodium chloride, , PRN  potassium chloride, 40 mEq, PRN   Or  potassium alternative oral replacement, 40 mEq, PRN   Or  potassium chloride, 10 mEq, PRN  magnesium sulfate, 2,000 mg, PRN  polyethylene glycol, 17 g, Daily PRN  acetaminophen, 650 mg, Q6H PRN   Or  acetaminophen, 650 mg, Q6H PRN  prochlorperazine, 5 mg, Q6H PRN  sulfur hexafluoride microspheres, 2 mL, ONCE PRN  white petrolatum, , BID PRN  loperamide, 2 mg, 4x Daily PRN         Objective:    BP (!) 131/55   Pulse 78   Temp 99.3 °F (37.4 °C) (Temporal)   Resp 18   Ht 1.778 m (5' 10\")   Wt (!) 173.7 kg  transcription errors may be present.  Electronically signed by VERONICA FULLER MD on 3/17/2025 at 2:47 PM

## 2025-03-17 NOTE — PROGRESS NOTES
Pharmacy Consultation Note  (Warfarin Dosing and Monitoring)    Initial consult date: 3-  Consulting Provider: Dr. Zoltan Sanchez JANET Mcdonald is a 69 y.o. male for whom pharmacy has been asked to manage warfarin therapy.     Weight:   Wt Readings from Last 1 Encounters:   03/14/25 (!) 173.7 kg (383 lb)       TSH:    Lab Results   Component Value Date/Time    TSH 1.830 07/27/2020 08:57 AM       Hepatic Function Panel:                            Lab Results   Component Value Date/Time    ALKPHOS 60 03/13/2025 11:15 PM    ALT 9 03/13/2025 11:15 PM    AST 12 03/13/2025 11:15 PM    BILITOT 0.5 03/13/2025 11:15 PM       Current significant warfarin drug-drug interactions include: No significant interactions.    Recent Labs     03/15/25  0705 03/16/25  0606   HGB 8.4* 8.0*     Date Warfarin Dose INR Heparin or LMWH Comment   3/14 4 mg 1.2 X    3/15 4 mg  1.3 X    3/16 5 mg 1.4 X    3/17 < 4 mg > 1.9 X             Assessment:  70 yo/M, admitted from NH for AMS.  On warfarin PTA for Hx of PE (DVT?).    Warfarin dose: 3.5 mg on Tue & Wed only, 4 mg Sun-M-Thu-F-Sat only.    Recently admitted Feb 1-5, 2025 and Pharmacy consulted for warfarin dosing at that time.  On 2-5-2025 Pharmacy note states patient said he takes only 2.5 mg daily.  Goal INR 2 - 3.  3/17: INR = 1.9; reflects 4 mg doses from 3/14 and 3/15.    Plan:  Give warfarin 4 mg x1 tonight (resume home dose).  Daily PT/INR until the INR is stable within the therapeutic range.  Pharmacist will follow and monitor/adjust dosing as necessary.    Alexx Raza, PharmD  3/17/2025  12:23 PM   or

## 2025-03-17 NOTE — ACP (ADVANCE CARE PLANNING)
Advance Care Planning   Healthcare Decision Maker:    Primary Decision Maker: Michaelle Mcdonald - Spouse - [phone]    Supplemental (Other) Decision Maker: Jeffrey Mcdonald - Brother/Sister - [phone]    Click here to complete Healthcare Decision Makers including selection of the Healthcare Decision Maker Relationship (ie \"Primary\").

## 2025-03-17 NOTE — CARE COORDINATION
3/17/25  Spoke with patient regarding transition of care.  Patient admitted for respiratory failure from [facility].  Patient is not a bed hold at the facility but able to return if desired.  Patient used [facility] at the facility.  Patient voicing he would like to go to [facility] at discharge.  Reviewed with patient he does not meet criteria for acute rehab but Woodrow and Jacky liaisons for [facility] also reviewed and confirmed.  Jacky to reach out to the patient per his request. Patient not sure he would like to return to [facility] and requesting new referrals be placed to 1.[facility],2.  [facility] or 3. [facility]. All 3 facilities declined a month ago second to weight and bariatric needs but referrals were still called to for liaisons to review per patient request.  Patient was re choiced but unwilling to give further choices until the previous facilities review. Offered a [facility] list to patient but he is declining. PT/OT evaluated with AM-PAC of 7/24 for PT and 13/24 for OT. Discharge goal is a [facility] stay with destination to be determined. SW/CM to follow.    Electronically signed by SUGAR Alfaro on 3/17/2025 at 3:12 PM     Case Management Assessment  Initial Evaluation    Date/Time of Evaluation: 3/17/2025 3:15 PM  Assessment Completed by: SUGAR Alfaro    If patient is discharged prior to next notation, then this note serves as note for discharge by case management.    Patient Name: Daniel Mcdonald                   YOB: 1955  Diagnosis: Hypovolemic shock (HCC) [R57.1]  Elevated troponin [R79.89]  Elevated brain natriuretic peptide (BNP) level [R79.89]  Congestive heart failure, unspecified HF chronicity, unspecified heart failure type (HCC) [I50.9]  Acute hypoxic respiratory failure (HCC) [J96.01]                   Date / Time: 3/13/2025 10:50 PM    Patient Admission Status: Inpatient   Readmission Risk (Low < 19, Mod (19-27), High >

## 2025-03-17 NOTE — PLAN OF CARE
Problem: Chronic Conditions and Co-morbidities  Goal: Patient's chronic conditions and co-morbidity symptoms are monitored and maintained or improved  Outcome: Progressing     Problem: Discharge Planning  Goal: Discharge to home or other facility with appropriate resources  Outcome: Progressing     Problem: Skin/Tissue Integrity  Goal: Skin integrity remains intact  Description: 1.  Monitor for areas of redness and/or skin breakdown  2.  Assess vascular access sites hourly  3.  Every 4-6 hours minimum:  Change oxygen saturation probe site  4.  Every 4-6 hours:  If on nasal continuous positive airway pressure, respiratory therapy assess nares and determine need for appliance change or resting period  Outcome: Progressing

## 2025-03-18 LAB
INR PPP: 2.3
PROTHROMBIN TIME: 25.7 SEC (ref 9.3–12.4)

## 2025-03-18 PROCEDURE — 6370000000 HC RX 637 (ALT 250 FOR IP)

## 2025-03-18 PROCEDURE — 36415 COLL VENOUS BLD VENIPUNCTURE: CPT

## 2025-03-18 PROCEDURE — 2500000003 HC RX 250 WO HCPCS: Performed by: FAMILY MEDICINE

## 2025-03-18 PROCEDURE — 6370000000 HC RX 637 (ALT 250 FOR IP): Performed by: FAMILY MEDICINE

## 2025-03-18 PROCEDURE — 99232 SBSQ HOSP IP/OBS MODERATE 35: CPT | Performed by: STUDENT IN AN ORGANIZED HEALTH CARE EDUCATION/TRAINING PROGRAM

## 2025-03-18 PROCEDURE — 1200000000 HC SEMI PRIVATE

## 2025-03-18 PROCEDURE — 85610 PROTHROMBIN TIME: CPT

## 2025-03-18 PROCEDURE — 2700000000 HC OXYGEN THERAPY PER DAY

## 2025-03-18 RX ORDER — METOPROLOL TARTRATE 25 MG/1
12.5 TABLET, FILM COATED ORAL 2 TIMES DAILY
Qty: 60 TABLET | Refills: 3 | Status: SHIPPED | OUTPATIENT
Start: 2025-03-18

## 2025-03-18 RX ORDER — WARFARIN SODIUM 2 MG/1
2 TABLET ORAL
Status: COMPLETED | OUTPATIENT
Start: 2025-03-18 | End: 2025-03-18

## 2025-03-18 RX ADMIN — MICONAZOLE NITRATE: 20 POWDER TOPICAL at 20:50

## 2025-03-18 RX ADMIN — SODIUM CHLORIDE, PRESERVATIVE FREE 10 ML: 5 INJECTION INTRAVENOUS at 20:49

## 2025-03-18 RX ADMIN — PANTOPRAZOLE SODIUM 20 MG: 20 TABLET, DELAYED RELEASE ORAL at 08:01

## 2025-03-18 RX ADMIN — Medication 2 CAPSULE: at 08:01

## 2025-03-18 RX ADMIN — LOPERAMIDE HYDROCHLORIDE 2 MG: 2 CAPSULE ORAL at 07:07

## 2025-03-18 RX ADMIN — ASPIRIN 81 MG CHEWABLE TABLET 81 MG: 81 TABLET CHEWABLE at 08:01

## 2025-03-18 RX ADMIN — TAMSULOSIN HYDROCHLORIDE 0.4 MG: 0.4 CAPSULE ORAL at 08:01

## 2025-03-18 RX ADMIN — WARFARIN SODIUM 2 MG: 2 TABLET ORAL at 15:09

## 2025-03-18 RX ADMIN — MICONAZOLE NITRATE: 20 POWDER TOPICAL at 08:03

## 2025-03-18 RX ADMIN — LOPERAMIDE HYDROCHLORIDE 2 MG: 2 CAPSULE ORAL at 20:49

## 2025-03-18 RX ADMIN — SODIUM CHLORIDE, PRESERVATIVE FREE 10 ML: 5 INJECTION INTRAVENOUS at 08:02

## 2025-03-18 NOTE — DISCHARGE INSTR - COC
Continuity of Care Form    Patient Name: Daniel Mcdonald   :  1955  MRN:  [id number]    Admit date:  3/13/2025  Discharge date:  3/19    Code Status Order: Full Code   Advance Directives:     Admitting Physician:  Nahun Charlton MD  PCP: Geoff Kauffman DO    Discharging Nurse: ***  Discharging Hospital Unit/Room#: 6505/6505-A  Discharging Unit Phone Number: [phone]    Emergency Contact:   Extended Emergency Contact Information  Primary Emergency Contact: Michaelle Mcdonald  Address: [address] [place] [address] [facility]  Home Phone: [phone]  Mobile Phone: [phone]  Relation: Spouse   needed? No  Secondary Emergency Contact: [place] [address] [facility]  Home Phone: [phone]  Mobile Phone: [phone]  Relation: Brother/Sister   needed? No    Past Surgical History:  Past Surgical History:   Procedure Laterality Date    ANKLE SURGERY Left 10/16/2024    Left Ankle Irrigation and Debridement with Placement of Left Ankle Spanning External Fixator performed by Jose Morrissey DO [facility]    CARDIAC SURGERY  2019    ACB x 5 at [facility]    COLONOSCOPY  2006    ENDOSCOPY, COLON, DIAGNOSTIC      THROAT SURGERY      TONSILLECTOMY      WISDOM TOOTH EXTRACTION         Immunization History:   There is no immunization history for the selected administration types on file for this patient.    Active Problems:  Patient Active Problem List   Diagnosis Code    Morbid obesity with BMI of 60.0-69.9, adult E66.01, Z68.44    SUKHWINDER (obstructive sleep apnea) G47.33    Diabetes mellitus type 2, uncontrolled [id number]    Coronary artery disease involving native coronary artery without angina pectoris I25.10    Hyperlipidemia LDL goal <100 E78.5    Essential hypertension I10    Acute kidney injury N17.9    Acute diastolic heart failure (HCC) I50.31    Pulmonary embolism, bilateral

## 2025-03-18 NOTE — CARE COORDINATION
3/18/25  Transition of Care update. Discharge order noted.  Patient has been declined by [facility] as well as [facility] and [facility].   Reviewed with patient that he has been medically cleared for discharge.  Reviewed sending patient back to [facility] and him working with their staff on a transfer if he choses to a different facility.  [facility] is willing and able to take patient back. Patient will not require a pre-cert for [facility].  Patient requesting a referral be placed to [facility] [phone] with referral placed and awaiting return call as a message has been left for liaison Rosy as well as Rolan, Director of business Dev. PT/OT evaluations reveal AM-PAC of 7/24 for PT and 13/24 for OT. Discharge goal is still pending at this time. RICARDO/GUANAKO to follow.     12:00pm  Received a call from Layne [phone] at [facility] with clinicals faxed to [phone] to review. Update to Jenaro Sinha, case management length of stay coordinator regarding status of discharge. Jenaro to meet with patient.     3:25pm Follow up call to [facility], referral is still being reviewed by the medical director.     [facility]45pm [facility]  [facility] will be out in the am to assess patient.      Electronically signed by SUGAR Alfaro on 3/18/2025 at 12:02 PM

## 2025-03-18 NOTE — PROGRESS NOTES
[facility] Hospitalist Progress Note    Admitting Date and Time: 3/13/2025 10:50 PM  Admit Dx: Hypovolemic shock (HCC) [R57.1]  Elevated troponin [R79.89]  Elevated brain natriuretic peptide (BNP) level [R79.89]  Congestive heart failure, unspecified HF chronicity, unspecified heart failure type (HCC) [I50.9]  Acute hypoxic respiratory failure (HCC) [J96.01]    Subjective:  Patient is being followed for Hypovolemic shock (HCC) [R57.1]  Elevated troponin [R79.89]  Elevated brain natriuretic peptide (BNP) level [R79.89]  Congestive heart failure, unspecified HF chronicity, unspecified heart failure type (HCC) [I50.9]  Acute hypoxic respiratory failure (HCC) [J96.01]       Pending skilled nursing facility.  [facility] is in  Discharge is in    ROS: denies fever, chills, cp, sob, n/v, HA unless stated above.      [Held by provider] metoprolol tartrate  12.5 mg Oral BID    sodium chloride flush  5-40 mL IntraVENous 2 times per day    [Held by provider] furosemide  40 mg Oral Daily    [Held by provider] glipiZIDE  5 mg Oral Daily    lactobacillus  2 capsule Oral Daily    pantoprazole  20 mg Oral Daily    [Held by provider] sennosides-docusate sodium  2 tablet Oral BID    tamsulosin  0.4 mg Oral Daily    aspirin  81 mg Oral Daily    warfarin placeholder: dosing by pharmacy   Oral RX Placeholder    miconazole   Topical BID     sodium chloride flush, 5-40 mL, PRN  sodium chloride, , PRN  potassium chloride, 40 mEq, PRN   Or  potassium alternative oral replacement, 40 mEq, PRN   Or  potassium chloride, 10 mEq, PRN  magnesium sulfate, 2,000 mg, PRN  polyethylene glycol, 17 g, Daily PRN  acetaminophen, 650 mg, Q6H PRN   Or  acetaminophen, 650 mg, Q6H PRN  prochlorperazine, 5 mg, Q6H PRN  sulfur hexafluoride microspheres, 2 mL, ONCE PRN  white petrolatum, , BID PRN  loperamide, 2 mg, 4x Daily PRN         Objective:    /83   Pulse 81   Temp 97.3 °F (36.3 °C) (Temporal)   Resp 18   Ht 1.778 m (5' 10\")   Wt (!) 173.7

## 2025-03-18 NOTE — PATIENT CARE CONFERENCE
P Quality Flow/Interdisciplinary Rounds Progress Note        Quality Flow Rounds held on March 18, 2025    Disciplines Attending:  Bedside Nurse, , , and Nursing Unit Leadership    Daniel Mcdonald was admitted on 3/13/2025 10:50 PM    Anticipated Discharge Date:       Disposition:    Lupillo Score:  Lupillo Scale Score: 14    [facility] RISK OF UNPLANNED READMISSION 2.0             20.9 Total Score        Discussed patient goal for the day, patient clinical progression, and barriers to discharge.  The following Goal(s) of the Day/Commitment(s) have been identified:  downgrade      Juana Mckeon RN  March 18, 2025

## 2025-03-18 NOTE — PROGRESS NOTES
Pharmacy Consultation Note  (Warfarin Dosing and Monitoring)    Initial consult date: 3-  Consulting Provider: Dr. Zoltan Sanchez JANET Mcdonald is a 69 y.o. male for whom pharmacy has been asked to manage warfarin therapy.     Weight:   Wt Readings from Last 1 Encounters:   03/14/25 (!) 173.7 kg (383 lb)       TSH:    Lab Results   Component Value Date/Time    TSH 1.830 07/27/2020 08:57 AM       Hepatic Function Panel:                            Lab Results   Component Value Date/Time    ALKPHOS 60 03/13/2025 11:15 PM    ALT 9 03/13/2025 11:15 PM    AST 12 03/13/2025 11:15 PM    BILITOT 0.5 03/13/2025 11:15 PM       Current significant warfarin drug-drug interactions include: No significant interactions.    Recent Labs     03/16/25  0606   HGB 8.0*     Date Warfarin Dose INR Heparin or LMWH Comment   3/14 4 mg 1.2 X    3/15 4 mg  1.3 X    3/16 5 mg 1.4 X    3/17 4 mg 1.9 X    3/18 < 2 mg > N/A X             Assessment:  70 yo/M, admitted from NH for AMS.  On warfarin PTA for Hx of PE (DVT?).    Warfarin dose: 3.5 mg on Tue & Wed only, 4 mg Sun-M-Thu-F-Sat only.    Recently admitted Feb 1-5, 2025 and Pharmacy consulted for warfarin dosing at that time.  On 2-5-2025 Pharmacy note states patient said he takes only 2.5 mg daily.  Goal INR 2 - 3.  3/17: INR = 1.9; reflects 4 mg doses from 3/14 and 3/15.  3/18: INR = N/A -- patient refused AM labs today.  Pending transfer another facility today.      Plan:  Give warfarin 2 mg x1 today prior to transfer.   Recommend resuming home dose on 3/19/2025 if INR < 3.1 and frequent INR checks and warfarin dose adjustments as needed to maintain INR goal of 2-3  Daily PT/INR until the INR is stable within the therapeutic range.  Pharmacist will follow and monitor/adjust dosing as necessary.    Alexx Raza, PharmD  3/18/2025  2:23 PM   or

## 2025-03-18 NOTE — PROGRESS NOTES
Patient received the Sacrament of the Anointing of the Sick by Father Niels High on Monday, March 17, 2025.    If additional support is requested or needed please reach out to Spiritual Health ([phone]).    Chap. Aldo Gastelum MDIV, BCC

## 2025-03-18 NOTE — PLAN OF CARE
Problem: Chronic Conditions and Co-morbidities  Goal: Patient's chronic conditions and co-morbidity symptoms are monitored and maintained or improved  3/17/2025 2010 by Margaret Rowley RN  Outcome: Progressing

## 2025-03-19 VITALS
DIASTOLIC BLOOD PRESSURE: 77 MMHG | BODY MASS INDEX: 45.1 KG/M2 | SYSTOLIC BLOOD PRESSURE: 145 MMHG | RESPIRATION RATE: 16 BRPM | WEIGHT: 315 LBS | TEMPERATURE: 97 F | OXYGEN SATURATION: 94 % | HEIGHT: 70 IN | HEART RATE: 88 BPM

## 2025-03-19 LAB
INR PPP: 2.4
MICROORGANISM SPEC CULT: NORMAL
MICROORGANISM SPEC CULT: NORMAL
PROTHROMBIN TIME: 26.2 SEC (ref 9.3–12.4)
SERVICE CMNT-IMP: NORMAL
SERVICE CMNT-IMP: NORMAL
SPECIMEN DESCRIPTION: NORMAL
SPECIMEN DESCRIPTION: NORMAL

## 2025-03-19 PROCEDURE — 6370000000 HC RX 637 (ALT 250 FOR IP)

## 2025-03-19 PROCEDURE — 36415 COLL VENOUS BLD VENIPUNCTURE: CPT

## 2025-03-19 PROCEDURE — 2500000003 HC RX 250 WO HCPCS: Performed by: FAMILY MEDICINE

## 2025-03-19 PROCEDURE — 6370000000 HC RX 637 (ALT 250 FOR IP): Performed by: FAMILY MEDICINE

## 2025-03-19 PROCEDURE — 99239 HOSP IP/OBS DSCHRG MGMT >30: CPT | Performed by: STUDENT IN AN ORGANIZED HEALTH CARE EDUCATION/TRAINING PROGRAM

## 2025-03-19 PROCEDURE — 6370000000 HC RX 637 (ALT 250 FOR IP): Performed by: STUDENT IN AN ORGANIZED HEALTH CARE EDUCATION/TRAINING PROGRAM

## 2025-03-19 PROCEDURE — 6370000000 HC RX 637 (ALT 250 FOR IP): Performed by: INTERNAL MEDICINE

## 2025-03-19 PROCEDURE — 85610 PROTHROMBIN TIME: CPT

## 2025-03-19 RX ORDER — WARFARIN SODIUM 4 MG/1
4 TABLET ORAL
Status: COMPLETED | OUTPATIENT
Start: 2025-03-19 | End: 2025-03-19

## 2025-03-19 RX ADMIN — ACETAMINOPHEN 650 MG: 325 TABLET ORAL at 20:41

## 2025-03-19 RX ADMIN — LOPERAMIDE HYDROCHLORIDE 2 MG: 2 CAPSULE ORAL at 02:08

## 2025-03-19 RX ADMIN — ACETAMINOPHEN 650 MG: 325 TABLET ORAL at 12:13

## 2025-03-19 RX ADMIN — LOPERAMIDE HYDROCHLORIDE 2 MG: 2 CAPSULE ORAL at 16:04

## 2025-03-19 RX ADMIN — SODIUM CHLORIDE, PRESERVATIVE FREE 10 ML: 5 INJECTION INTRAVENOUS at 09:44

## 2025-03-19 RX ADMIN — PANTOPRAZOLE SODIUM 20 MG: 20 TABLET, DELAYED RELEASE ORAL at 09:44

## 2025-03-19 RX ADMIN — PETROLATUM: 420 OINTMENT TOPICAL at 20:41

## 2025-03-19 RX ADMIN — WARFARIN SODIUM 4 MG: 4 TABLET ORAL at 18:17

## 2025-03-19 RX ADMIN — ASPIRIN 81 MG CHEWABLE TABLET 81 MG: 81 TABLET CHEWABLE at 09:44

## 2025-03-19 RX ADMIN — TAMSULOSIN HYDROCHLORIDE 0.4 MG: 0.4 CAPSULE ORAL at 09:44

## 2025-03-19 RX ADMIN — MICONAZOLE NITRATE: 20 POWDER TOPICAL at 20:41

## 2025-03-19 RX ADMIN — Medication 2 CAPSULE: at 09:43

## 2025-03-19 RX ADMIN — LOPERAMIDE HYDROCHLORIDE 2 MG: 2 CAPSULE ORAL at 09:43

## 2025-03-19 ASSESSMENT — PAIN DESCRIPTION - LOCATION
LOCATION: BACK
LOCATION: SACRUM;BACK

## 2025-03-19 ASSESSMENT — PAIN SCALES - GENERAL
PAINLEVEL_OUTOF10: 4
PAINLEVEL_OUTOF10: 8
PAINLEVEL_OUTOF10: 0

## 2025-03-19 ASSESSMENT — PAIN DESCRIPTION - ONSET: ONSET: ON-GOING

## 2025-03-19 ASSESSMENT — PAIN DESCRIPTION - FREQUENCY: FREQUENCY: CONTINUOUS

## 2025-03-19 ASSESSMENT — PAIN DESCRIPTION - ORIENTATION
ORIENTATION: LOWER
ORIENTATION: LOWER

## 2025-03-19 ASSESSMENT — PAIN - FUNCTIONAL ASSESSMENT: PAIN_FUNCTIONAL_ASSESSMENT: PREVENTS OR INTERFERES SOME ACTIVE ACTIVITIES AND ADLS

## 2025-03-19 ASSESSMENT — PAIN DESCRIPTION - DESCRIPTORS
DESCRIPTORS: SHARP;ACHING;DISCOMFORT
DESCRIPTORS: ACHING;DISCOMFORT;DULL

## 2025-03-19 ASSESSMENT — PAIN DESCRIPTION - PAIN TYPE: TYPE: ACUTE PAIN

## 2025-03-19 NOTE — PLAN OF CARE
Problem: Chronic Conditions and Co-morbidities  Goal: Patient's chronic conditions and co-morbidity symptoms are monitored and maintained or improved  3/18/2025 2120 by Alysa Owens RN  Outcome: Progressing     Problem: Discharge Planning  Goal: Discharge to home or other facility with appropriate resources  3/18/2025 2120 by Alysa Owens RN  Outcome: Progressing     Problem: Skin/Tissue Integrity  Goal: Skin integrity remains intact  Description: 1.  Monitor for areas of redness and/or skin breakdown  2.  Assess vascular access sites hourly  3.  Every 4-6 hours minimum:  Change oxygen saturation probe site  4.  Every 4-6 hours:  If on nasal continuous positive airway pressure, respiratory therapy assess nares and determine need for appliance change or resting period  3/18/2025 2120 by Alysa Owens RN  Outcome: Progressing     Problem: Safety - Adult  Goal: Free from fall injury  3/18/2025 2120 by Alysa Owens, RN  Outcome: Progressing

## 2025-03-19 NOTE — PROGRESS NOTES
4 Eyes Skin Assessment     NAME:  Daniel Mcdonald  YOB: 1955  MEDICAL RECORD NUMBER:  [id number]    The patient is being assessed for  Transfer to New Unit    I agree that at least one RN has performed a thorough Head to Toe Skin Assessment on the patient. ALL assessment sites listed below have been assessed.      Areas assessed by both nurses:    Head, Face, Ears, Shoulders, Back, Chest, Arms, Elbows, Hands, Sacrum. Buttock, Coccyx, Ischium, Legs. Feet and Heels, and Under Medical Devices         Does the Patient have a Wound? Yes wound(s) were present on assessment. LDA wound assessment was Initiated and completed by RN       Lupillo Prevention initiated by RN: Yes  Wound Care Orders initiated by RN: Yes    Pressure Injury (Stage 3,4, Unstageable, DTI, NWPT, and Complex wounds) if present, place Wound referral order by RN under : Yes    New Ostomies, if present place, Ostomy referral order under : No     Nurse 1 eSignature: Electronically signed by Alysa Feliciano RN on 3/18/25 at 9:25 PM EDT    **SHARE this note so that the co-signing nurse can place an eSignature**    Nurse 2 eSignature: Electronically signed by Sobia Gutierrez RN on 3/18/25 at 9:32 PM EDT

## 2025-03-19 NOTE — CARE COORDINATION
Care Coordination  The patient was transferred from  and a discharge order noted. The liason from [facility] acute rehab Biju Grier was here to evaluate the patient for there acute rehab. The liason was going to call the acute rehab and call back with a final decision today as he is discharged. If declined by the acute rehab the plan will be for the patient to go back to [facility].      1511 The patient was accepted to go to [facility] acute rehab. The patient was notified of this. [facility] be picked up at 530pm this evening . Called biju Grier the liason at the acute rehab to notify her of the transport time. Envelope done

## 2025-03-19 NOTE — DISCHARGE SUMMARY
[facility]   IN-PATIENT SERVICE    Discharge Summary     Patient ID: Daniel Mcdonald  :  1955   MRN: [id number]     ACCOUNT:  [id number]   Patient's PCP: Geoff Kauffman DO  Admit Date: 3/13/2025   Discharge Date: 3/19/2025     Length of Stay: 5  Code Status:  Full Code  Admitting Physician: Nahun Charlton MD  Discharge Physician: Ruby Sigala MD     Active Discharge Diagnoses:     Hospital Problem Lists:  Principal Problem:    Acute hypoxic respiratory failure (HCC)  Active Problems:    Congestive heart failure (HCC)  Resolved Problems:    * No resolved hospital problems. *      Admission Condition:  fair     Discharged Condition: fair    Hospital Stay:     Hospital Course:  Daniel Mcdonald is a 69 y.o. male who was admitted for the management of   Acute hypoxic respiratory failure (HCC) , presented to ER with Altered Mental Status (GI distress today from nursing home, given zofran and found unresponsive pulse ox in 30s)    69-year-old male past medical history of PE on Coumadin, non-insulin-dependent diabetes, hypertension, coronary artery disease, sleep apnea, nocturnal oxygen dependent 3 L, CKD, morbid obesity BMI greater than 50 from nursing due to concern for responsiveness.  EMS was called and he was responsive found to be 30% on room air was placed on oxygen.  In ER presentation was hypotensive 67/45 placed on 6 L oxygen.  Chest x-ray without acute findings.  Patient was given IV fluids blood pressure did improve to 111/53.  Troponin of 146 and 138.  proBNP of 2033.  He was given antibiotics with vancomycin and Zosyn given concern for sepsis.  Of note patient has been having diarrhea and nausea for last few days and has been dehydrated.  Urinalysis with evidence of UTI.  CT head with no acute findings.  CT angiogram of the chest was ordered which was negative for pulmonary embolism or acute findings.  CT abdomen pelvis with no acute intra-abdominal  review them with you.                   Where to Get Your Medications        These medications were sent to [id number] - [place], [place] - [address] - P [phone] - F [phone]  [address], [facility] [address]      Phone: [phone]   metoprolol tartrate 25 MG tablet         No discharge procedures on file.    Time Spent on discharge is  37 mins in patient examination, evaluation, counseling as well as medication reconciliation, prescriptions for required medications, discharge plan and follow up.    Electronically signed by   Ruby Sigala MD  3/19/2025  11:12 AM      Thank you Geoff Lira DO for the opportunity to be involved in this patient's care.

## 2025-03-19 NOTE — PROGRESS NOTES
Pharmacy Consultation Note  (Warfarin Dosing and Monitoring)    Initial consult date: 3-  Consulting Provider: Dr. Zoltan Sanchez JANET Mcdonald is a 69 y.o. male for whom pharmacy has been asked to manage warfarin therapy.     Weight:   Wt Readings from Last 1 Encounters:   03/14/25 (!) 173.7 kg (383 lb)       TSH:    Lab Results   Component Value Date/Time    TSH 1.830 07/27/2020 08:57 AM       Hepatic Function Panel:                            Lab Results   Component Value Date/Time    ALKPHOS 60 03/13/2025 11:15 PM    ALT 9 03/13/2025 11:15 PM    AST 12 03/13/2025 11:15 PM    BILITOT 0.5 03/13/2025 11:15 PM       Current significant warfarin drug-drug interactions include: No significant interactions.    No results for input(s): \"HGB\", \"PLT\" in the last 72 hours.    Date Warfarin Dose INR Heparin or LMWH Comment   3/14 4 mg 1.2 X    3/15 4 mg  1.3 X    3/16 5 mg 1.4 X    3/17 4 mg 1.9 X    3/18 2 mg 2.3 X    3/19 < 4 mg > 2.4 X      Assessment:  68 yo/M, admitted from NH for AMS.  On warfarin PTA for Hx of PE (DVT?).    Warfarin dose: 3.5 mg on Tue & Wed only, 4 mg Sun-M-Thu-F-Sat only.    Recently admitted Feb 1-5, 2025 and Pharmacy consulted for warfarin dosing at that time.  On 2-5-2025 Pharmacy note states patient said he takes only 2.5 mg daily.  Goal INR 2 - 3.  3/17: INR = 1.9; reflects 4 mg doses from 3/14 and 3/15.  3/18: INR = N/A -- patient refused AM labs today.  Pending transfer another facility today.    3/19: INR = 2.4 today (2.3 on 3/18 but reported late).  Pending discharge today.    Plan:  Give warfarin 4 mg x1 tonight.   Recommend resuming home dose on 3/20/2025 and monitor INR and make warfarin dose adjustments as needed to maintain INR goal of 2-3  Daily PT/INR until the INR is stable within the therapeutic range.  Pharmacist will follow and monitor/adjust dosing as necessary.    Alexx Raza, PharmD  3/19/2025  12:53 PM   or

## 2025-03-19 NOTE — FLOWSHEET NOTE
Inpatient Wound Care(initial evaluation)  5422    Admit Date: 3/13/2025 10:50 PM    Reason for consult:  left heel    Significant history:  per H & P  Chief Complaint:  Altered mental status  Past medical history of PE on Coumadin, non-insulin-dependent diabetes, hypertension, coronary artery disease, sleep apnea, nocturnal oxygen dependent 3 L, CKD, morbid obesity BMI greater than 50 from nursing due to concern for responsiveness.  EMS was called and he was responsive found to be 30% on room air was placed on oxygen.  In ER presentation was hypotensive 67/45 placed on 6 L oxygen. Patient was given IV fluids blood pressure did improve to 111/53.  Troponin of 146 and 138.  proBNP of 2033.  He was given antibiotics with vancomycin and Zosyn given concern for sepsis.  Of note patient has been having diarrhea and nausea for last few days and has been dehydrated    Wound history:  admitted with wound from F    Findings:     03/19/25 0930   Skin Integumentary    Skin Integrity Ecchymosis   Location BUE, abdomen   Skin Fold Management Yes   Treatment Pharmaceutical   Assessed This Shift Moist   Skin Integrity Site 2   Skin Integrity Location 2 Vascular discoloration  (dry flaky)   Location 2 BLE   Skin Integrity Site 3   Skin Integrity Location 3 Cracking/fissures    Location 3 heels   Wound 03/14/25 Heel Left   Date First Assessed/Time First Assessed: 03/14/25 1757   Present on Original Admission: Yes  Primary Wound Type: Pressure Injury  Location: Heel  Wound Location Orientation: Left   Wound Image    Wound Etiology Deep tissue/Injury   Dressing Status New dressing applied   Wound Cleansed Cleansed with saline   Dressing/Treatment Dry dressing;Betadine swabs/povidone iodine;Roll gauze;Ace wrap   Wound Length (cm) 1.4 cm   Wound Width (cm) 2 cm   Wound Depth (cm) 0.1 cm   Wound Surface Area (cm^2) 2.8 cm^2   Change in Wound Size % (l*w) -180   Wound Volume (cm^3) 0.28 cm^3   Wound Healing % -180   Wound Assessment

## 2025-03-20 NOTE — PLAN OF CARE
Problem: Chronic Conditions and Co-morbidities  Goal: Patient's chronic conditions and co-morbidity symptoms are monitored and maintained or improved  Outcome: Progressing     Problem: Discharge Planning  Goal: Discharge to home or other facility with appropriate resources  Outcome: Progressing     Problem: Skin/Tissue Integrity  Goal: Skin integrity remains intact  Description: 1.  Monitor for areas of redness and/or skin breakdown  2.  Assess vascular access sites hourly  3.  Every 4-6 hours minimum:  Change oxygen saturation probe site  4.  Every 4-6 hours:  If on nasal continuous positive airway pressure, respiratory therapy assess nares and determine need for appliance change or resting period  Outcome: Progressing     Problem: Safety - Adult  Goal: Free from fall injury  Outcome: Progressing     Problem: ABCDS Injury Assessment  Goal: Absence of physical injury  Outcome: Progressing     Problem: Nutrition Deficit:  Goal: Optimize nutritional status  Outcome: Progressing     Problem: Pain  Goal: Verbalizes/displays adequate comfort level or baseline comfort level  Outcome: Progressing

## 2025-03-21 NOTE — PROGRESS NOTES
Physician Progress Note      PATIENT:               GRAYSON WHYTE  [facility] #:                  [id number]  :                       1955  ADMIT DATE:       3/13/2025 10:50 PM  DISCH DATE:        3/19/2025 10:44 PM  RESPONDING  PROVIDER #:        Ruby Sigala MD          QUERY TEXT:    Patient admitted with AMS, acute hypoxic respiratory failure. Documentation   reflects Sepsis per HP, PN 3/19 and DC summary.   If possible, please document   in the progress notes and discharge summary if Sepsis was:    The medical record reflects the following:  Risk Factors: UTI, acute hypoxic respiratory failure  Clinical Indicators: HP 3/14, PN 3/19 and DC summary- He was given antibiotics   with vancomycin and Zosyn given concern for sepsis. DC summary- urine culture   no growth, Altered mental status: Patient alert and oriented now.  No cause   found back to his baseline.EMS was called and he was responsive found to be   30% on room air was placed on oxygen.  In ER presentation was hypotensive   67/45 placed on 6 L oxygen.  Chest x-ray without acute findings.  Patient was   given IV fluids blood pressure did improve to 111/53.  Troponin of 146 and   138.  proBNP of .   Of note patient has b  Treatment: consults, labs, monitoring, IV abx, IVF bolus  Options provided:  -- Sepsis confirmed after study  -- Sepsis ruled out after study  -- Other - I will add my own diagnosis  -- Disagree - Not applicable / Not valid  -- Disagree - Clinically unable to determine / Unknown  -- Refer to Clinical Documentation Reviewer    PROVIDER RESPONSE TEXT:    Sepsis confirmed after study.    Query created by: Thalia Robles on 3/21/2025 11:33 AM      Electronically signed by:  Ruby Sigala MD 3/21/2025 12:12 PM

## 2025-05-20 ENCOUNTER — APPOINTMENT (OUTPATIENT)
Dept: GENERAL RADIOLOGY | Age: 70
DRG: 193 | End: 2025-05-20
Payer: MEDICARE

## 2025-05-20 ENCOUNTER — HOSPITAL ENCOUNTER (INPATIENT)
Age: 70
LOS: 6 days | Discharge: HOME | DRG: 193 | End: 2025-05-27
Attending: EMERGENCY MEDICINE | Admitting: HOSPITALIST
Payer: MEDICARE

## 2025-05-20 DIAGNOSIS — N17.9 AKI (ACUTE KIDNEY INJURY): ICD-10-CM

## 2025-05-20 DIAGNOSIS — E87.5 HYPERKALEMIA: ICD-10-CM

## 2025-05-20 DIAGNOSIS — J96.01 ACUTE RESPIRATORY FAILURE WITH HYPOXIA AND HYPERCAPNIA (HCC): Primary | ICD-10-CM

## 2025-05-20 DIAGNOSIS — R33.9 URINARY RETENTION: ICD-10-CM

## 2025-05-20 DIAGNOSIS — J96.02 ACUTE RESPIRATORY FAILURE WITH HYPOXIA AND HYPERCAPNIA (HCC): Primary | ICD-10-CM

## 2025-05-20 DIAGNOSIS — S91.309A WOUND OF FOOT: ICD-10-CM

## 2025-05-20 PROCEDURE — 6370000000 HC RX 637 (ALT 250 FOR IP): Performed by: EMERGENCY MEDICINE

## 2025-05-20 PROCEDURE — 83690 ASSAY OF LIPASE: CPT

## 2025-05-20 PROCEDURE — 83735 ASSAY OF MAGNESIUM: CPT

## 2025-05-20 PROCEDURE — 99285 EMERGENCY DEPT VISIT HI MDM: CPT

## 2025-05-20 PROCEDURE — 51701 INSERT BLADDER CATHETER: CPT

## 2025-05-20 PROCEDURE — 84484 ASSAY OF TROPONIN QUANT: CPT

## 2025-05-20 PROCEDURE — 85610 PROTHROMBIN TIME: CPT

## 2025-05-20 PROCEDURE — 85025 COMPLETE CBC W/AUTO DIFF WBC: CPT

## 2025-05-20 PROCEDURE — 80053 COMPREHEN METABOLIC PANEL: CPT

## 2025-05-20 PROCEDURE — 71045 X-RAY EXAM CHEST 1 VIEW: CPT

## 2025-05-20 PROCEDURE — 94640 AIRWAY INHALATION TREATMENT: CPT

## 2025-05-20 PROCEDURE — 5A09357 ASSISTANCE WITH RESPIRATORY VENTILATION, LESS THAN 24 CONSECUTIVE HOURS, CONTINUOUS POSITIVE AIRWAY PRESSURE: ICD-10-PCS | Performed by: INTERNAL MEDICINE

## 2025-05-20 PROCEDURE — 83605 ASSAY OF LACTIC ACID: CPT

## 2025-05-20 PROCEDURE — 84443 ASSAY THYROID STIM HORMONE: CPT

## 2025-05-20 PROCEDURE — 94660 CPAP INITIATION&MGMT: CPT

## 2025-05-20 PROCEDURE — 83880 ASSAY OF NATRIURETIC PEPTIDE: CPT

## 2025-05-20 PROCEDURE — 87040 BLOOD CULTURE FOR BACTERIA: CPT

## 2025-05-20 RX ORDER — IPRATROPIUM BROMIDE AND ALBUTEROL SULFATE 2.5; .5 MG/3ML; MG/3ML
1 SOLUTION RESPIRATORY (INHALATION) ONCE
Status: COMPLETED | OUTPATIENT
Start: 2025-05-20 | End: 2025-05-20

## 2025-05-20 RX ORDER — METHYLPREDNISOLONE SODIUM SUCCINATE 125 MG/2ML
125 INJECTION INTRAMUSCULAR; INTRAVENOUS ONCE
Status: COMPLETED | OUTPATIENT
Start: 2025-05-20 | End: 2025-05-21

## 2025-05-20 RX ADMIN — IPRATROPIUM BROMIDE AND ALBUTEROL SULFATE 1 DOSE: 2.5; .5 SOLUTION RESPIRATORY (INHALATION) at 23:47

## 2025-05-20 ASSESSMENT — PAIN - FUNCTIONAL ASSESSMENT: PAIN_FUNCTIONAL_ASSESSMENT: NONE - DENIES PAIN

## 2025-05-21 ENCOUNTER — APPOINTMENT (OUTPATIENT)
Dept: CT IMAGING | Age: 70
DRG: 193 | End: 2025-05-21
Payer: MEDICARE

## 2025-05-21 PROBLEM — N17.9 AKI (ACUTE KIDNEY INJURY): Status: ACTIVE | Noted: 2025-05-21

## 2025-05-21 PROBLEM — J96.01 ACUTE RESPIRATORY FAILURE WITH HYPOXIA AND HYPERCAPNIA (HCC): Status: ACTIVE | Noted: 2025-05-21

## 2025-05-21 PROBLEM — B34.8 RHINOVIRUS INFECTION: Status: ACTIVE | Noted: 2025-05-21

## 2025-05-21 PROBLEM — J96.02 ACUTE RESPIRATORY FAILURE WITH HYPOXIA AND HYPERCAPNIA (HCC): Status: ACTIVE | Noted: 2025-05-21

## 2025-05-21 PROBLEM — E87.5 HYPERKALEMIA: Status: ACTIVE | Noted: 2025-05-21

## 2025-05-21 LAB
ALBUMIN SERPL-MCNC: 3.3 G/DL (ref 3.5–5.2)
ALP SERPL-CCNC: 78 U/L (ref 40–129)
ALT SERPL-CCNC: 8 U/L (ref 0–40)
ANION GAP SERPL CALCULATED.3IONS-SCNC: 12 MMOL/L (ref 7–16)
ANION GAP SERPL CALCULATED.3IONS-SCNC: 17 MMOL/L (ref 7–16)
ANION GAP SERPL CALCULATED.3IONS-SCNC: 6 MMOL/L (ref 7–16)
ANION GAP SERPL CALCULATED.3IONS-SCNC: 8 MMOL/L (ref 7–16)
ANION GAP SERPL CALCULATED.3IONS-SCNC: 8 MMOL/L (ref 7–16)
AST SERPL-CCNC: 17 U/L (ref 0–39)
B PARAP IS1001 DNA NPH QL NAA+NON-PROBE: NOT DETECTED
B PERT DNA SPEC QL NAA+PROBE: NOT DETECTED
B.E.: 1.2 MMOL/L (ref -3–3)
B.E.: 2.4 MMOL/L (ref -3–3)
B.E.: 4.6 MMOL/L (ref -3–3)
BACTERIA URNS QL MICRO: ABNORMAL
BASOPHILS # BLD: 0.15 K/UL (ref 0–0.2)
BASOPHILS NFR BLD: 3 % (ref 0–2)
BILIRUB SERPL-MCNC: 0.4 MG/DL (ref 0–1.2)
BILIRUB UR QL STRIP: NEGATIVE
BNP SERPL-MCNC: 2780 PG/ML (ref 0–125)
BUN SERPL-MCNC: 40 MG/DL (ref 6–23)
BUN SERPL-MCNC: 40 MG/DL (ref 6–23)
BUN SERPL-MCNC: 49 MG/DL (ref 6–23)
BUN SERPL-MCNC: 52 MG/DL (ref 6–23)
BUN SERPL-MCNC: 54 MG/DL (ref 6–23)
C PNEUM DNA NPH QL NAA+NON-PROBE: NOT DETECTED
CALCIUM SERPL-MCNC: 8.2 MG/DL (ref 8.6–10.2)
CALCIUM SERPL-MCNC: 8.3 MG/DL (ref 8.6–10.2)
CALCIUM SERPL-MCNC: 8.4 MG/DL (ref 8.6–10.2)
CALCIUM SERPL-MCNC: 8.4 MG/DL (ref 8.6–10.2)
CALCIUM SERPL-MCNC: 8.5 MG/DL (ref 8.6–10.2)
CASTS #/AREA URNS LPF: ABNORMAL /LPF
CHLORIDE SERPL-SCNC: 100 MMOL/L (ref 98–107)
CHLORIDE SERPL-SCNC: 101 MMOL/L (ref 98–107)
CHLORIDE SERPL-SCNC: 104 MMOL/L (ref 98–107)
CHLORIDE SERPL-SCNC: 104 MMOL/L (ref 98–107)
CHLORIDE SERPL-SCNC: 106 MMOL/L (ref 98–107)
CLARITY UR: CLEAR
CO2 SERPL-SCNC: 15 MMOL/L (ref 22–29)
CO2 SERPL-SCNC: 26 MMOL/L (ref 22–29)
CO2 SERPL-SCNC: 28 MMOL/L (ref 22–29)
CO2 SERPL-SCNC: 29 MMOL/L (ref 22–29)
CO2 SERPL-SCNC: 31 MMOL/L (ref 22–29)
COHB: 1.2 % (ref 0–1.5)
COHB: 1.5 % (ref 0–1.5)
COHB: 1.6 % (ref 0–1.5)
COLOR UR: YELLOW
COMMENT: ABNORMAL
CREAT SERPL-MCNC: 1.7 MG/DL (ref 0.7–1.2)
CREAT SERPL-MCNC: 1.8 MG/DL (ref 0.7–1.2)
CREAT SERPL-MCNC: 1.9 MG/DL (ref 0.7–1.2)
CREAT SERPL-MCNC: 1.9 MG/DL (ref 0.7–1.2)
CREAT SERPL-MCNC: 2 MG/DL (ref 0.7–1.2)
CRITICAL: ABNORMAL
DATE ANALYZED: ABNORMAL
DATE OF COLLECTION: ABNORMAL
EOSINOPHIL # BLD: 0.1 K/UL (ref 0.05–0.5)
EOSINOPHILS RELATIVE PERCENT: 2 % (ref 0–6)
EPI CELLS #/AREA URNS HPF: ABNORMAL /HPF
ERYTHROCYTE [DISTWIDTH] IN BLOOD BY AUTOMATED COUNT: 24.9 % (ref 11.5–15)
FIO2: 40 %
FLUAV RNA NPH QL NAA+NON-PROBE: NOT DETECTED
FLUBV RNA NPH QL NAA+NON-PROBE: NOT DETECTED
GFR, ESTIMATED: 36 ML/MIN/1.73M2
GFR, ESTIMATED: 37 ML/MIN/1.73M2
GFR, ESTIMATED: 39 ML/MIN/1.73M2
GFR, ESTIMATED: 40 ML/MIN/1.73M2
GFR, ESTIMATED: 43 ML/MIN/1.73M2
GLUCOSE BLD-MCNC: 192 MG/DL (ref 74–99)
GLUCOSE BLD-MCNC: 377 MG/DL (ref 74–99)
GLUCOSE BLD-MCNC: 382 MG/DL (ref 74–99)
GLUCOSE BLD-MCNC: 386 MG/DL (ref 74–99)
GLUCOSE BLD-MCNC: 398 MG/DL (ref 74–99)
GLUCOSE SERPL-MCNC: 110 MG/DL (ref 74–99)
GLUCOSE SERPL-MCNC: 144 MG/DL (ref 74–99)
GLUCOSE SERPL-MCNC: 200 MG/DL (ref 74–99)
GLUCOSE SERPL-MCNC: 380 MG/DL (ref 74–99)
GLUCOSE SERPL-MCNC: 428 MG/DL (ref 74–99)
GLUCOSE UR STRIP-MCNC: 100 MG/DL
HADV DNA NPH QL NAA+NON-PROBE: NOT DETECTED
HCO3: 30.1 MMOL/L (ref 22–26)
HCO3: 30.6 MMOL/L (ref 22–26)
HCO3: 33.4 MMOL/L (ref 22–26)
HCOV 229E RNA NPH QL NAA+NON-PROBE: NOT DETECTED
HCOV HKU1 RNA NPH QL NAA+NON-PROBE: NOT DETECTED
HCOV NL63 RNA NPH QL NAA+NON-PROBE: NOT DETECTED
HCOV OC43 RNA NPH QL NAA+NON-PROBE: NOT DETECTED
HCT VFR BLD AUTO: 45.1 % (ref 37–54)
HGB BLD-MCNC: 11.9 G/DL (ref 12.5–16.5)
HGB UR QL STRIP.AUTO: NEGATIVE
HHB: 10.4 % (ref 0–5)
HHB: 12.4 % (ref 0–5)
HHB: 3.5 % (ref 0–5)
HMPV RNA NPH QL NAA+NON-PROBE: NOT DETECTED
HPIV1 RNA NPH QL NAA+NON-PROBE: NOT DETECTED
HPIV2 RNA NPH QL NAA+NON-PROBE: NOT DETECTED
HPIV3 RNA NPH QL NAA+NON-PROBE: NOT DETECTED
HPIV4 RNA NPH QL NAA+NON-PROBE: NOT DETECTED
INFLUENZA A BY PCR: NOT DETECTED
INFLUENZA B BY PCR: NOT DETECTED
INR PPP: 3.2
KETONES UR STRIP-MCNC: NEGATIVE MG/DL
LAB: ABNORMAL
LACTATE BLDV-SCNC: 0.6 MMOL/L (ref 0.5–1.9)
LEUKOCYTE ESTERASE UR QL STRIP: NEGATIVE
LIPASE SERPL-CCNC: 21 U/L (ref 13–60)
LYMPHOCYTES NFR BLD: 0.97 K/UL (ref 1.5–4)
LYMPHOCYTES RELATIVE PERCENT: 17 % (ref 20–42)
Lab: 105
Lab: 2110
Lab: 408
M PNEUMO DNA NPH QL NAA+NON-PROBE: NOT DETECTED
MAGNESIUM SERPL-MCNC: 2.2 MG/DL (ref 1.6–2.6)
MCH RBC QN AUTO: 19.7 PG (ref 26–35)
MCHC RBC AUTO-ENTMCNC: 26.4 G/DL (ref 32–34.5)
MCV RBC AUTO: 74.7 FL (ref 80–99.9)
METHB: 0.3 % (ref 0–1.5)
MODE: ABNORMAL
MONOCYTES NFR BLD: 0.51 K/UL (ref 0.1–0.95)
MONOCYTES NFR BLD: 9 % (ref 2–12)
NEUTROPHILS NFR BLD: 70 % (ref 43–80)
NEUTS SEG NFR BLD: 4.16 K/UL (ref 1.8–7.3)
NITRITE UR QL STRIP: NEGATIVE
O2 CONTENT: 14.9 ML/DL
O2 CONTENT: 14.9 ML/DL
O2 CONTENT: 15.6 ML/DL
O2 SATURATION: 87.4 % (ref 92–98.5)
O2 SATURATION: 89.4 % (ref 92–98.5)
O2 SATURATION: 96.4 % (ref 92–98.5)
O2HB: 86.1 % (ref 94–97)
O2HB: 87.7 % (ref 94–97)
O2HB: 94.7 % (ref 94–97)
OPERATOR ID: 7296
OPERATOR ID: ABNORMAL
OPERATOR ID: ABNORMAL
PATIENT TEMP: 37 C
PCO2: 65.3 MMHG (ref 35–45)
PCO2: 70.9 MMHG (ref 35–45)
PCO2: 74.4 MMHG (ref 35–45)
PEEP/CPAP: 5 CMH2O
PFO2: 2.27 MMHG/%
PH BLOOD GAS: 7.25 (ref 7.35–7.45)
PH BLOOD GAS: 7.27 (ref 7.35–7.45)
PH BLOOD GAS: 7.29 (ref 7.35–7.45)
PH UR STRIP: 5.5 [PH] (ref 5–8)
PLATELET, FLUORESCENCE: 311 K/UL (ref 130–450)
PMV BLD AUTO: ABNORMAL FL (ref 7–12)
PO2: 56.3 MMHG (ref 75–100)
PO2: 61.4 MMHG (ref 75–100)
PO2: 90.9 MMHG (ref 75–100)
POTASSIUM SERPL-SCNC: 5.1 MMOL/L (ref 3.5–5)
POTASSIUM SERPL-SCNC: 5.9 MMOL/L (ref 3.5–5)
POTASSIUM SERPL-SCNC: 6.4 MMOL/L (ref 3.5–5)
POTASSIUM SERPL-SCNC: 6.8 MMOL/L (ref 3.5–5)
POTASSIUM SERPL-SCNC: 7.3 MMOL/L (ref 3.5–5)
PROCALCITONIN SERPL-MCNC: 0.18 NG/ML (ref 0–0.08)
PROT SERPL-MCNC: 6.3 G/DL (ref 6.4–8.3)
PROT UR STRIP-MCNC: >=300 MG/DL
PROTHROMBIN TIME: 34.5 SEC (ref 9.3–12.4)
PS: 15 CMH20
RBC # BLD AUTO: 6.04 M/UL (ref 3.8–5.8)
RBC # BLD: ABNORMAL 10*6/UL
RBC #/AREA URNS HPF: ABNORMAL /HPF
RI(T): 1.2
RSV RNA NPH QL NAA+NON-PROBE: NOT DETECTED
RV+EV RNA NPH QL NAA+NON-PROBE: DETECTED
SARS-COV-2 RDRP RESP QL NAA+PROBE: NOT DETECTED
SARS-COV-2 RNA NPH QL NAA+NON-PROBE: NOT DETECTED
SODIUM SERPL-SCNC: 136 MMOL/L (ref 132–146)
SODIUM SERPL-SCNC: 136 MMOL/L (ref 132–146)
SODIUM SERPL-SCNC: 139 MMOL/L (ref 132–146)
SODIUM SERPL-SCNC: 141 MMOL/L (ref 132–146)
SODIUM SERPL-SCNC: 143 MMOL/L (ref 132–146)
SOURCE, BLOOD GAS: ABNORMAL
SP GR UR STRIP: >1.03 (ref 1–1.03)
SPECIMEN DESCRIPTION: ABNORMAL
SPECIMEN DESCRIPTION: NORMAL
THB: 11.6 G/DL (ref 11.5–16.5)
THB: 12.1 G/DL (ref 11.5–16.5)
THB: 12.3 G/DL (ref 11.5–16.5)
TIME ANALYZED: 108
TIME ANALYZED: 2123
TIME ANALYZED: 411
TROPONIN I SERPL HS-MCNC: 135 NG/L (ref 0–22)
TROPONIN I SERPL HS-MCNC: 138 NG/L (ref 0–22)
TSH SERPL DL<=0.05 MIU/L-ACNC: 1.19 UIU/ML (ref 0.27–4.2)
UROBILINOGEN UR STRIP-ACNC: 0.2 EU/DL (ref 0–1)
WBC #/AREA URNS HPF: ABNORMAL /HPF
WBC OTHER # BLD: 5.9 K/UL (ref 4.5–11.5)

## 2025-05-21 PROCEDURE — 93005 ELECTROCARDIOGRAM TRACING: CPT | Performed by: EMERGENCY MEDICINE

## 2025-05-21 PROCEDURE — 6360000002 HC RX W HCPCS: Performed by: EMERGENCY MEDICINE

## 2025-05-21 PROCEDURE — 87899 AGENT NOS ASSAY W/OPTIC: CPT

## 2025-05-21 PROCEDURE — 2500000003 HC RX 250 WO HCPCS: Performed by: INTERNAL MEDICINE

## 2025-05-21 PROCEDURE — 6370000000 HC RX 637 (ALT 250 FOR IP)

## 2025-05-21 PROCEDURE — 6370000000 HC RX 637 (ALT 250 FOR IP): Performed by: EMERGENCY MEDICINE

## 2025-05-21 PROCEDURE — 80048 BASIC METABOLIC PNL TOTAL CA: CPT

## 2025-05-21 PROCEDURE — 87086 URINE CULTURE/COLONY COUNT: CPT

## 2025-05-21 PROCEDURE — 96365 THER/PROPH/DIAG IV INF INIT: CPT

## 2025-05-21 PROCEDURE — 94640 AIRWAY INHALATION TREATMENT: CPT

## 2025-05-21 PROCEDURE — 2500000003 HC RX 250 WO HCPCS: Performed by: EMERGENCY MEDICINE

## 2025-05-21 PROCEDURE — 70450 CT HEAD/BRAIN W/O DYE: CPT

## 2025-05-21 PROCEDURE — 0202U NFCT DS 22 TRGT SARS-COV-2: CPT

## 2025-05-21 PROCEDURE — 92526 ORAL FUNCTION THERAPY: CPT | Performed by: SPEECH-LANGUAGE PATHOLOGIST

## 2025-05-21 PROCEDURE — 82805 BLOOD GASES W/O2 SATURATION: CPT

## 2025-05-21 PROCEDURE — 6360000002 HC RX W HCPCS

## 2025-05-21 PROCEDURE — 6360000002 HC RX W HCPCS: Performed by: INTERNAL MEDICINE

## 2025-05-21 PROCEDURE — 36600 WITHDRAWAL OF ARTERIAL BLOOD: CPT

## 2025-05-21 PROCEDURE — 51702 INSERT TEMP BLADDER CATH: CPT

## 2025-05-21 PROCEDURE — 87635 SARS-COV-2 COVID-19 AMP PRB: CPT

## 2025-05-21 PROCEDURE — 96375 TX/PRO/DX INJ NEW DRUG ADDON: CPT

## 2025-05-21 PROCEDURE — 2060000000 HC ICU INTERMEDIATE R&B

## 2025-05-21 PROCEDURE — 2580000003 HC RX 258: Performed by: EMERGENCY MEDICINE

## 2025-05-21 PROCEDURE — 6370000000 HC RX 637 (ALT 250 FOR IP): Performed by: NURSE PRACTITIONER

## 2025-05-21 PROCEDURE — 2500000003 HC RX 250 WO HCPCS

## 2025-05-21 PROCEDURE — 6370000000 HC RX 637 (ALT 250 FOR IP): Performed by: INTERNAL MEDICINE

## 2025-05-21 PROCEDURE — 87502 INFLUENZA DNA AMP PROBE: CPT

## 2025-05-21 PROCEDURE — 82962 GLUCOSE BLOOD TEST: CPT

## 2025-05-21 PROCEDURE — 92610 EVALUATE SWALLOWING FUNCTION: CPT | Performed by: SPEECH-LANGUAGE PATHOLOGIST

## 2025-05-21 PROCEDURE — 96367 TX/PROPH/DG ADDL SEQ IV INF: CPT

## 2025-05-21 PROCEDURE — 2580000003 HC RX 258: Performed by: INTERNAL MEDICINE

## 2025-05-21 PROCEDURE — 87449 NOS EACH ORGANISM AG IA: CPT

## 2025-05-21 PROCEDURE — 87081 CULTURE SCREEN ONLY: CPT

## 2025-05-21 PROCEDURE — 94660 CPAP INITIATION&MGMT: CPT

## 2025-05-21 PROCEDURE — 84484 ASSAY OF TROPONIN QUANT: CPT

## 2025-05-21 PROCEDURE — 84145 PROCALCITONIN (PCT): CPT

## 2025-05-21 PROCEDURE — 2580000003 HC RX 258

## 2025-05-21 PROCEDURE — 81001 URINALYSIS AUTO W/SCOPE: CPT

## 2025-05-21 PROCEDURE — 99223 1ST HOSP IP/OBS HIGH 75: CPT | Performed by: INTERNAL MEDICINE

## 2025-05-21 PROCEDURE — 71250 CT THORAX DX C-: CPT

## 2025-05-21 RX ORDER — GLUCAGON 1 MG/ML
1 KIT INJECTION PRN
Status: DISCONTINUED | OUTPATIENT
Start: 2025-05-21 | End: 2025-05-27 | Stop reason: HOSPADM

## 2025-05-21 RX ORDER — METHYLPREDNISOLONE SODIUM SUCCINATE 40 MG/ML
40 INJECTION INTRAMUSCULAR; INTRAVENOUS EVERY 12 HOURS
Status: DISCONTINUED | OUTPATIENT
Start: 2025-05-21 | End: 2025-05-24

## 2025-05-21 RX ORDER — POLYETHYLENE GLYCOL 3350 17 G/17G
17 POWDER, FOR SOLUTION ORAL DAILY PRN
Status: DISCONTINUED | OUTPATIENT
Start: 2025-05-21 | End: 2025-05-27 | Stop reason: HOSPADM

## 2025-05-21 RX ORDER — LACTOBACILLUS RHAMNOSUS GG 10B CELL
2 CAPSULE ORAL DAILY
Status: DISCONTINUED | OUTPATIENT
Start: 2025-05-21 | End: 2025-05-21

## 2025-05-21 RX ORDER — BISACODYL 10 MG
10 SUPPOSITORY, RECTAL RECTAL DAILY
Status: DISCONTINUED | OUTPATIENT
Start: 2025-05-21 | End: 2025-05-22

## 2025-05-21 RX ORDER — ONDANSETRON 4 MG/1
4 TABLET, ORALLY DISINTEGRATING ORAL EVERY 8 HOURS PRN
Status: DISCONTINUED | OUTPATIENT
Start: 2025-05-21 | End: 2025-05-27 | Stop reason: HOSPADM

## 2025-05-21 RX ORDER — BISACODYL 10 MG
10 SUPPOSITORY, RECTAL RECTAL DAILY
COMMUNITY

## 2025-05-21 RX ORDER — PANTOPRAZOLE SODIUM 20 MG/1
20 TABLET, DELAYED RELEASE ORAL DAILY
Status: DISCONTINUED | OUTPATIENT
Start: 2025-05-22 | End: 2025-05-22

## 2025-05-21 RX ORDER — SODIUM CHLORIDE 0.9 % (FLUSH) 0.9 %
5-40 SYRINGE (ML) INJECTION EVERY 12 HOURS SCHEDULED
Status: DISCONTINUED | OUTPATIENT
Start: 2025-05-21 | End: 2025-05-27 | Stop reason: HOSPADM

## 2025-05-21 RX ORDER — INDOMETHACIN 25 MG/1
100 CAPSULE ORAL ONCE
Status: COMPLETED | OUTPATIENT
Start: 2025-05-21 | End: 2025-05-21

## 2025-05-21 RX ORDER — DEXTROSE MONOHYDRATE 100 MG/ML
INJECTION, SOLUTION INTRAVENOUS CONTINUOUS PRN
Status: DISCONTINUED | OUTPATIENT
Start: 2025-05-21 | End: 2025-05-27 | Stop reason: HOSPADM

## 2025-05-21 RX ORDER — GUAIFENESIN 600 MG/1
1200 TABLET, EXTENDED RELEASE ORAL 2 TIMES DAILY
COMMUNITY

## 2025-05-21 RX ORDER — ALBUTEROL SULFATE 0.83 MG/ML
2.5 SOLUTION RESPIRATORY (INHALATION)
Status: COMPLETED | OUTPATIENT
Start: 2025-05-21 | End: 2025-05-21

## 2025-05-21 RX ORDER — FLUDROCORTISONE ACETATE 0.1 MG/1
0.1 TABLET ORAL DAILY
Status: COMPLETED | OUTPATIENT
Start: 2025-05-21 | End: 2025-05-23

## 2025-05-21 RX ORDER — WARFARIN SODIUM 1 MG/1
1 TABLET ORAL
Status: COMPLETED | OUTPATIENT
Start: 2025-05-21 | End: 2025-05-21

## 2025-05-21 RX ORDER — FERROUS SULFATE 325(65) MG
325 TABLET ORAL
Status: DISCONTINUED | OUTPATIENT
Start: 2025-05-22 | End: 2025-05-27 | Stop reason: HOSPADM

## 2025-05-21 RX ORDER — INSULIN LISPRO 100 [IU]/ML
0-4 INJECTION, SOLUTION INTRAVENOUS; SUBCUTANEOUS
Status: DISCONTINUED | OUTPATIENT
Start: 2025-05-21 | End: 2025-05-23

## 2025-05-21 RX ORDER — IPRATROPIUM BROMIDE AND ALBUTEROL SULFATE 2.5; .5 MG/3ML; MG/3ML
1 SOLUTION RESPIRATORY (INHALATION)
Status: DISCONTINUED | OUTPATIENT
Start: 2025-05-21 | End: 2025-05-27 | Stop reason: HOSPADM

## 2025-05-21 RX ORDER — POLYETHYLENE GLYCOL 3350 17 G/17G
17 POWDER, FOR SOLUTION ORAL DAILY PRN
COMMUNITY

## 2025-05-21 RX ORDER — ALBUTEROL SULFATE 0.83 MG/ML
2.5 SOLUTION RESPIRATORY (INHALATION) EVERY 6 HOURS PRN
COMMUNITY

## 2025-05-21 RX ORDER — FOLIC ACID 1 MG/1
1 TABLET ORAL DAILY
Status: DISCONTINUED | OUTPATIENT
Start: 2025-05-22 | End: 2025-05-27 | Stop reason: HOSPADM

## 2025-05-21 RX ORDER — SODIUM CHLORIDE 9 MG/ML
INJECTION, SOLUTION INTRAVENOUS ONCE
Status: COMPLETED | OUTPATIENT
Start: 2025-05-21 | End: 2025-05-21

## 2025-05-21 RX ORDER — DEXTROSE MONOHYDRATE 25 G/50ML
50 INJECTION, SOLUTION INTRAVENOUS ONCE
Status: DISCONTINUED | OUTPATIENT
Start: 2025-05-21 | End: 2025-05-21 | Stop reason: CLARIF

## 2025-05-21 RX ORDER — WARFARIN SODIUM 3 MG/1
3 TABLET ORAL EVERY EVENING
COMMUNITY

## 2025-05-21 RX ORDER — TAMSULOSIN HYDROCHLORIDE 0.4 MG/1
0.4 CAPSULE ORAL DAILY
Status: DISCONTINUED | OUTPATIENT
Start: 2025-05-21 | End: 2025-05-27 | Stop reason: HOSPADM

## 2025-05-21 RX ORDER — SODIUM CHLORIDE 0.9 % (FLUSH) 0.9 %
5-40 SYRINGE (ML) INJECTION PRN
Status: DISCONTINUED | OUTPATIENT
Start: 2025-05-21 | End: 2025-05-27 | Stop reason: HOSPADM

## 2025-05-21 RX ORDER — DOCUSATE SODIUM 100 MG/1
100 CAPSULE, LIQUID FILLED ORAL DAILY PRN
COMMUNITY

## 2025-05-21 RX ORDER — CALCIUM GLUCONATE 98 MG/ML
1000 INJECTION, SOLUTION INTRAVENOUS ONCE
Status: COMPLETED | OUTPATIENT
Start: 2025-05-21 | End: 2025-05-21

## 2025-05-21 RX ORDER — HYDROCORTISONE 25 MG/G
CREAM TOPICAL 2 TIMES DAILY
COMMUNITY

## 2025-05-21 RX ORDER — ACETAMINOPHEN 325 MG/1
650 TABLET ORAL EVERY 6 HOURS PRN
Status: DISCONTINUED | OUTPATIENT
Start: 2025-05-21 | End: 2025-05-27 | Stop reason: HOSPADM

## 2025-05-21 RX ORDER — INSULIN LISPRO 100 [IU]/ML
12 INJECTION, SOLUTION INTRAVENOUS; SUBCUTANEOUS ONCE
Status: COMPLETED | OUTPATIENT
Start: 2025-05-22 | End: 2025-05-21

## 2025-05-21 RX ORDER — METOPROLOL TARTRATE 25 MG/1
12.5 TABLET, FILM COATED ORAL 2 TIMES DAILY
Status: DISCONTINUED | OUTPATIENT
Start: 2025-05-21 | End: 2025-05-27 | Stop reason: HOSPADM

## 2025-05-21 RX ORDER — DOCUSATE SODIUM 100 MG/1
100 CAPSULE, LIQUID FILLED ORAL DAILY PRN
Status: DISCONTINUED | OUTPATIENT
Start: 2025-05-21 | End: 2025-05-22

## 2025-05-21 RX ORDER — FLUTICASONE PROPIONATE 50 MCG
1 SPRAY, SUSPENSION (ML) NASAL NIGHTLY
COMMUNITY

## 2025-05-21 RX ORDER — LACTULOSE 10 G/15ML
20 SOLUTION ORAL DAILY PRN
COMMUNITY

## 2025-05-21 RX ORDER — ASPIRIN 81 MG/1
81 TABLET ORAL 2 TIMES DAILY
Status: DISCONTINUED | OUTPATIENT
Start: 2025-05-21 | End: 2025-05-27 | Stop reason: HOSPADM

## 2025-05-21 RX ORDER — FERROUS SULFATE 325(65) MG
325 TABLET ORAL
Status: ON HOLD | COMMUNITY
End: 2025-05-27

## 2025-05-21 RX ORDER — LORATADINE 10 MG/1
10 TABLET ORAL DAILY PRN
COMMUNITY

## 2025-05-21 RX ORDER — FOLIC ACID 1 MG/1
1 TABLET ORAL DAILY
COMMUNITY

## 2025-05-21 RX ORDER — SODIUM CHLORIDE 9 MG/ML
INJECTION, SOLUTION INTRAVENOUS PRN
Status: DISCONTINUED | OUTPATIENT
Start: 2025-05-21 | End: 2025-05-27 | Stop reason: HOSPADM

## 2025-05-21 RX ORDER — ALBUTEROL SULFATE 1.25 MG/3ML
5 SOLUTION RESPIRATORY (INHALATION) ONCE
Status: COMPLETED | OUTPATIENT
Start: 2025-05-21 | End: 2025-05-21

## 2025-05-21 RX ORDER — LACTULOSE 10 G/15ML
20 SOLUTION ORAL DAILY PRN
Status: DISCONTINUED | OUTPATIENT
Start: 2025-05-21 | End: 2025-05-22

## 2025-05-21 RX ORDER — INDOMETHACIN 25 MG/1
CAPSULE ORAL
Status: DISPENSED
Start: 2025-05-21 | End: 2025-05-21

## 2025-05-21 RX ORDER — ONDANSETRON 2 MG/ML
4 INJECTION INTRAMUSCULAR; INTRAVENOUS EVERY 6 HOURS PRN
Status: DISCONTINUED | OUTPATIENT
Start: 2025-05-21 | End: 2025-05-27 | Stop reason: HOSPADM

## 2025-05-21 RX ORDER — INSULIN LISPRO 100 [IU]/ML
0-4 INJECTION, SOLUTION INTRAVENOUS; SUBCUTANEOUS
Status: DISCONTINUED | OUTPATIENT
Start: 2025-05-21 | End: 2025-05-21 | Stop reason: SDUPTHER

## 2025-05-21 RX ORDER — FUROSEMIDE 20 MG/1
20 TABLET ORAL DAILY
Status: DISCONTINUED | OUTPATIENT
Start: 2025-05-21 | End: 2025-05-25

## 2025-05-21 RX ORDER — ACETAMINOPHEN 650 MG/1
650 SUPPOSITORY RECTAL EVERY 6 HOURS PRN
Status: DISCONTINUED | OUTPATIENT
Start: 2025-05-21 | End: 2025-05-27 | Stop reason: HOSPADM

## 2025-05-21 RX ADMIN — MICONAZOLE NITRATE 1 APPLICATION: 20 POWDER TOPICAL at 13:17

## 2025-05-21 RX ADMIN — SODIUM ZIRCONIUM CYCLOSILICATE 10 G: 10 POWDER, FOR SUSPENSION ORAL at 02:31

## 2025-05-21 RX ADMIN — INSULIN HUMAN 10 UNITS: 100 INJECTION, SOLUTION PARENTERAL at 01:41

## 2025-05-21 RX ADMIN — SODIUM CHLORIDE, PRESERVATIVE FREE 10 ML: 5 INJECTION INTRAVENOUS at 20:24

## 2025-05-21 RX ADMIN — Medication: at 19:31

## 2025-05-21 RX ADMIN — DEXTROSE MONOHYDRATE 250 ML: 100 INJECTION, SOLUTION INTRAVENOUS at 01:36

## 2025-05-21 RX ADMIN — DOXYCYCLINE 100 MG: 100 INJECTION, POWDER, LYOPHILIZED, FOR SOLUTION INTRAVENOUS at 03:49

## 2025-05-21 RX ADMIN — INSULIN HUMAN 5 UNITS: 100 INJECTION, SOLUTION PARENTERAL at 19:25

## 2025-05-21 RX ADMIN — METHYLPREDNISOLONE SODIUM SUCCINATE 40 MG: 40 INJECTION, POWDER, LYOPHILIZED, FOR SOLUTION INTRAMUSCULAR; INTRAVENOUS at 13:17

## 2025-05-21 RX ADMIN — WARFARIN SODIUM 1 MG: 1 TABLET ORAL at 18:20

## 2025-05-21 RX ADMIN — ALBUTEROL SULFATE 5 MG: 1.25 SOLUTION RESPIRATORY (INHALATION) at 21:36

## 2025-05-21 RX ADMIN — ALBUTEROL SULFATE 2.5 MG: 2.5 SOLUTION RESPIRATORY (INHALATION) at 01:40

## 2025-05-21 RX ADMIN — SODIUM CHLORIDE, PRESERVATIVE FREE 10 ML: 5 INJECTION INTRAVENOUS at 13:18

## 2025-05-21 RX ADMIN — DOXYCYCLINE 100 MG: 100 INJECTION, POWDER, LYOPHILIZED, FOR SOLUTION INTRAVENOUS at 16:44

## 2025-05-21 RX ADMIN — CALCIUM GLUCONATE 1000 MG: 98 INJECTION INTRAVENOUS at 18:57

## 2025-05-21 RX ADMIN — ALBUTEROL SULFATE 2.5 MG: 2.5 SOLUTION RESPIRATORY (INHALATION) at 01:41

## 2025-05-21 RX ADMIN — IPRATROPIUM BROMIDE AND ALBUTEROL SULFATE 1 DOSE: .5; 2.5 SOLUTION RESPIRATORY (INHALATION) at 12:21

## 2025-05-21 RX ADMIN — SODIUM BICARBONATE 100 MEQ: 84 INJECTION, SOLUTION INTRAVENOUS at 01:35

## 2025-05-21 RX ADMIN — METHYLPREDNISOLONE SODIUM SUCCINATE 125 MG: 125 INJECTION INTRAMUSCULAR; INTRAVENOUS at 02:30

## 2025-05-21 RX ADMIN — CEFEPIME 2000 MG: 2 INJECTION, POWDER, FOR SOLUTION INTRAVENOUS at 02:33

## 2025-05-21 RX ADMIN — CALCIUM GLUCONATE 1000 MG: 98 INJECTION INTRAVENOUS at 01:38

## 2025-05-21 RX ADMIN — INSULIN LISPRO 4 UNITS: 100 INJECTION, SOLUTION INTRAVENOUS; SUBCUTANEOUS at 21:49

## 2025-05-21 RX ADMIN — MICONAZOLE NITRATE 1 APPLICATION: 20 POWDER TOPICAL at 20:24

## 2025-05-21 RX ADMIN — ASPIRIN 81 MG: 81 TABLET, COATED ORAL at 18:20

## 2025-05-21 RX ADMIN — SODIUM ZIRCONIUM CYCLOSILICATE 10 G: 10 POWDER, FOR SUSPENSION ORAL at 18:59

## 2025-05-21 RX ADMIN — SODIUM CHLORIDE: 0.9 INJECTION, SOLUTION INTRAVENOUS at 02:20

## 2025-05-21 RX ADMIN — INSULIN LISPRO 4 UNITS: 100 INJECTION, SOLUTION INTRAVENOUS; SUBCUTANEOUS at 16:54

## 2025-05-21 RX ADMIN — INSULIN LISPRO 12 UNITS: 100 INJECTION, SOLUTION INTRAVENOUS; SUBCUTANEOUS at 23:50

## 2025-05-21 RX ADMIN — ALBUTEROL SULFATE 2.5 MG: 2.5 SOLUTION RESPIRATORY (INHALATION) at 01:39

## 2025-05-21 RX ADMIN — BUMETANIDE 0.2 MG/HR: 0.25 INJECTION INTRAMUSCULAR; INTRAVENOUS at 19:30

## 2025-05-21 RX ADMIN — FLUDROCORTISONE ACETATE 0.1 MG: 0.1 TABLET ORAL at 18:59

## 2025-05-21 RX ADMIN — CEFEPIME 1000 MG: 1 INJECTION, POWDER, FOR SOLUTION INTRAMUSCULAR; INTRAVENOUS at 15:08

## 2025-05-21 ASSESSMENT — LIFESTYLE VARIABLES
HOW OFTEN DO YOU HAVE A DRINK CONTAINING ALCOHOL: NEVER
HOW MANY STANDARD DRINKS CONTAINING ALCOHOL DO YOU HAVE ON A TYPICAL DAY: PATIENT DOES NOT DRINK

## 2025-05-21 ASSESSMENT — PAIN SCALES - GENERAL
PAINLEVEL_OUTOF10: 0
PAINLEVEL_OUTOF10: 0

## 2025-05-21 NOTE — PROGRESS NOTES
SPEECH/LANGUAGE PATHOLOGY  CLINICAL ASSESSMENT OF SWALLOWING FUNCTION   and PLAN OF CARE      PATIENT NAME:  Daniel Mcdonald  (male)     MRN:  [id number]    :  1955  (69 y.o.)  STATUS:  Inpatient: Room 0447/0447-A    TODAY'S DATE:  2025  ORDER DATE, DESCRIPTION AND REFERRING PROVIDER: 25 0800    Speech Language Pathology clinical swallow screening  Start:  25 08,   End:  25 08,   ONE TIME,   Standing Count:  1 Occurrences,   R       Rene, Brinda Boo, APRN - CNP  REASON FOR REFERRAL:     possible aspiration pneumonia      EVALUATING THERAPIST: Muriel Hector, SLP                 RESULTS:    DYSPHAGIA DIAGNOSIS:   Clinical indicators of minimal pharyngeal phase dysphagia       DIET RECOMMENDATIONS:  Regular consistency solids (IDDSI level 7) with  thin liquids (IDDSI level 0)     FEEDING RECOMMENDATIONS:     Assistance level:  Stand by/ Hand over Hand assistance is needed during all oral intake      Compensatory strategies recommended: Fully alert for all PO, Thorough oral care to prevent colonization of oral bacteria, Upright in bed/ chair as tolerated  Slow rate of intake   SMALL bites  No talking during oral phase of swallow       Discussed recommendations with:  Patient     SPEECH THERAPY  PLAN OF CARE   The dysphagia POC is established based on physician order, dysphagia diagnosis and results of clinical assessment     Meal time assessment for 1-2 sessions to provide diet modification and compensatory strategy implementation due to need to ensure proper implementation of compensatory strategies during PO intake     Conditions Requiring Skilled Therapeutic Intervention for dysphagia:    Patient is performing below functional baseline d/t  current acute condition, respiratory compromise, multiple medications, and/or increased dependency upon caregivers.    Specific dysphagia interventions to include:     ongoing mealtime assessment to provide diet modification and  pectoris    Hyperlipidemia LDL goal <100    Essential hypertension    Acute kidney injury    Acute diastolic heart failure (HCC)    Pulmonary embolism, bilateral (HCC)    Neuropathy of right sciatic nerve    Generalized weakness    Physical deconditioning    Displaced fracture of proximal end of humerus    Ankle fracture, left, open type III, initial encounter    Anemia    Drug-induced constipation    BRBPR (bright red blood per rectum)    Acute hypoxic respiratory failure (HCC)    Congestive heart failure (HCC)    Acute respiratory failure with hypoxia and hypercapnia (HCC)    Hyperkalemia    Rhinovirus infection    REYNOLD (acute kidney injury)         CPT code:  40051  bedside swallow eval    INTERVENTION  CPT Code: 43359  dysphagia tx    Speech Pathologist (SLP) completed education with the patient/family regarding type of swallowing impairment. Reviewed current solid/liquid consistency diet recommendations and discussed compensatory strategies to ensure safe PO intake. Reviewed aspiration precautions and tried to reiterate the importance of not talking during the oral phase however Pt insistent it was just related to dry cooper cracker crumbs. Encouraged patient and/or family to engage SLP in unstructured Q&A session relative to identified deficit areas; indicated understanding of all information provided via satisfactory verbal response.    Muriel Hector M.S., CCC-SLP  Speech-Language Pathologist  [id number]  5/21/2025

## 2025-05-21 NOTE — PROGRESS NOTES
Left message with answering service of [employer] about changing NIV order from continuous to HS and daily naps and that pt is refusing continuous.

## 2025-05-21 NOTE — ED PROVIDER NOTES
[facility] EMERGENCY DEPARTMENT  EMERGENCY DEPARTMENT ENCOUNTER        Pt Name: Daniel Mcdonald  MRN: [id number]  Birthdate 1955  Date of evaluation: 5/20/2025  Provider: Victor Manuel Jones DO  PCP: Geoff Kauffman DO  Note Started: 11:38 PM EDT 5/20/25    CHIEF COMPLAINT       Chief Complaint   Patient presents with    Shortness of Breath    Fatigue     Pt from [facility], staff states pt's SP02 was in the 60's this afternoon, patient refused to come to ED for Evaluation until family talked him into it.  Patient more lethargic than normal.       HISTORY OF PRESENT ILLNESS: 1 or more Elements     History from : Patient and Family      Limitations to history : Altered Mental Status    Daniel Mcdonald is a 69 y.o. male who presents from [facility].  Patient has history of SUKHWINDER on BiPAP nocturnally, history of morbid obesity, history of diabetes coronary artery disease hypertension dyslipidemia, pulmonary embolism bilaterally diastolic heart failure hypoxic respiratory failure CHF had trimalleolar fracture of the left ankle and foot.  He has been rehab and has been unable to ambulate at [facility].  He presented after being hypoxic in the 60s there he has coarse cough and appears very weak and confused.  He is able to verbalize that he is at [facility] he states he is in no pain he denies any productive cough but states he has been coughing and short of breath.  He is on Coumadin    Nursing Notes were all reviewed and agreed with or any disagreements were addressed in the HPI.    REVIEW OF SYSTEMS :      Review of Systems   Respiratory:  Positive for cough and shortness of breath.    Cardiovascular:  Positive for leg swelling.       Positives and Pertinent negatives as per HPI.     SURGICAL HISTORY     Past Surgical History:   Procedure Laterality Date    ANKLE SURGERY Left 10/16/2024    Left Ankle Irrigation and Debridement with Placement of Left Ankle  Mother     Colon Cancer Mother     Heart Attack Father         SOCIAL HISTORY       Social History     Tobacco Use    Smoking status: Some Days     Types: Cigars    Smokeless tobacco: Never    Tobacco comments:     Denies cigarette smoking   Vaping Use    Vaping status: Never Used   Substance Use Topics    Alcohol use: Yes     Comment: rarely    Drug use: No       SCREENINGS        [place] Coma Scale  Eye Opening: Spontaneous  Best Verbal Response: Oriented  Best Motor Response: Obeys commands  [place] Coma Scale Score: 15                CIWA Assessment  BP: 118/87  Pulse: 87           PHYSICAL EXAM  1 or more Elements     ED Triage Vitals   BP Systolic BP Percentile Diastolic BP Percentile Temp Temp Source Pulse Respirations SpO2   05/20/25 2243 -- -- 05/20/25 2243 05/20/25 2243 05/20/25 2243 05/20/25 2243 05/20/25 2243   137/84   98.6 °F (37 °C) Oral 85 18 93 %      Height Weight - Scale         05/20/25 2241 05/20/25 2241         1.778 m (5' 10\") (!) 173.7 kg (383 lb)             Physical Exam  Vitals reviewed.   Constitutional:       General: He is not in acute distress.     Appearance: Normal appearance. He is obese. He is ill-appearing and diaphoretic. He is not toxic-appearing.   HENT:      Head: Normocephalic and atraumatic.      Right Ear: External ear normal.      Left Ear: External ear normal.      Nose: Nose normal. No congestion.      Mouth/Throat:      Mouth: Mucous membranes are moist.      Pharynx: Oropharynx is clear. No posterior oropharyngeal erythema.   Eyes:      Extraocular Movements: Extraocular movements intact.      Pupils: Pupils are equal, round, and reactive to light.   Cardiovascular:      Rate and Rhythm: Normal rate and regular rhythm.      Pulses: Normal pulses.      Heart sounds: No murmur heard.  Pulmonary:      Effort: Pulmonary effort is normal.      Breath sounds: Examination of the right-middle field reveals decreased breath sounds and rhonchi. Examination of the left-middle

## 2025-05-21 NOTE — CONSULTS
Department of Internal Medicine  Nephrology Attending Consult Note      Reason for Consult: REYNOLD, hyperkalemia  Requesting Physician:  Brinda López, APRN - CNP    CHIEF COMPLAINT: Hypoxemia    History Obtained From:  patient, electronic medical record    HISTORY OF PRESENT ILLNESS:  Brief Mr. Mcdonald is a 69-year-old  man with history of CKD stage IIIa, with severe proteinuria, probably related to diabetic kidney disease, baseline creatinine 1.3-1.5 mg/dL, HTN, type 2 DM, CAD status post CABG, HFpEF 60%, PE, SUKHWINDER on BiPAP, morbid obesity, hyperlipidemia, BMI >54, trimalleolar fracture of the left ankle/foot, who was admitted on 5/20/2025 after he was referred to the ER from [facility] for evaluation of hypoxemia, chest x-ray showed mild pulmonary edema, CT chest without contrast showed some increased markings in the left lung base concerning for possible aspiration, his proBNP was 2790, his ABG showed a pH of 7.246 with a PCO2 70.9, he was admitted with a diagnosis of acute respiratory failure and left lower lobe pneumonia, he has well was found to have rhinovirus.  On admission his potassium level was 5.6, creatinine 1.41, since admission his creatinine level has increased up to 1.9 mg/dL and his potassium is up to 7.3 meq/L although it is a hemolyzed sample, reasons for this consultation.  Prior to admission his medications included Lasix 20 mg p.o. daily.  Patient reports in the last 2 weeks or so he has gained significant amount of weight.     Past Medical History:        Diagnosis Date    Arthritis     Hips, possible shoulders and knees    CAD (coronary artery disease)     Cellulitis 11/2016    right leg    Diabetes mellitus (HCC)     Hyperlipidemia     Hypertension     Kidney failure     Morbid obesity with body mass index (BMI) of 60.0 to 69.9 in adult (HCC)     Pulmonary embolism (HCC)     Sleep apnea      Past Surgical History:        Procedure Laterality Date    ANKLE SURGERY Left  HEALTHY LIFESTYLE: [facility]    WHY: Overwhelming evidence indicates that modifiable lifestyle factors are the driving the leading causes of disease and death in the [place] today.  More than 60% of adults have 1 chronic disease and more than 40% have 2 chronic diseases.  Lifestyle interventions such as eating a plant-based diet, exercising daily, sleeping well, and managing stress effectively has been shown to prevent and treat most chronic diseases we suffer from today.  The most common and costly conditions: obesity, diabetes, hypertension, heart disease, depression, anxiety, arthritis/pain, autoimmunity, asthma/COPD, GERD/GI pain, and headaches all have been shown to be prevented and/or improved with lifestyle changes.      HOW: The first and most important changes you can make to prevent and/or treat your health concerns is improve your lifestyle.  Taking a medicine or undergoing a procedure might be an important part of your treatment plan, but only committing to that would ignore the underlying cause of your concerns.  It is essential you also engage in simple and powerful lifestyle changes to reverse the disease process and prevent the need for more medications and procedures.  The following is what a healthy lifestyle looks like.  Strive for this, making one or two changes at a time that are realistic and possible to maintain for the rest of your life.    WHAT IT LOOKS LIKE:  Below you will see The Black of Health which illustrates all the factors that contribute your health and well-being. While you may be doing well in some areas and not so well in other areas, it is essential to realize that your health is not dependent upon just one lifestyle factor such as nutrition or exercise.     Nutrition:  A plant-based diet consisting of the following daily servings has been shown to be the most powerful in treating and preventing disease, regardless of culture: 2 fruits, 4-5 vegetables, 2 whole  grains, 1 bean/legume, 1 nut or seed, and 1 lean meat or fish.  Cutting out fast food, processed foods/meats, and calorie laden drinks (milk, soda, juice) are great first steps to improving your nutrition.  The most healthy fluids to consume is water, unsweetened tea, and coffee.      Exercise:  Moving in a therapeutic way every day is essential to regulate blood sugar, sex hormones, maintain muscle and bone mass, prevent pain, avoid obesity, and manage stress.  Exercise has shown to have beneficial effects on nearly all the above mentioned health concerns and has been shown to be just as powerful as antidepressant medications.  Starting with 30 minutes daily at your fitness level is a good starting point and working you way up to 1 hour daily.  Exercise can be split into two 15-30 minute sessions and include everything from walking and gardening to yoga and strength training.      Sleep:  The studies are very clear that adults need 7-8 hours of sleep every night.  Those who get less die sooner.  Sleeping less than 7 hours regularly affects your mood, energy, weight, blood sugar, blood pressure, and can lead to all the above mentioned health conditions and diseases.  It is essential to sleep 7-8 hours, uninterrupted, nightly.  Start with simple sleep hygiene changes such as dark room with no TV or sounds on, turn off devices 1 hour before bed, and don't drink caffeine or stop after 10am.  If needed try 5-10mg of melatonin nightly for 2-3 months straight to help reset your sleep cycle.    Stress Management:  Too much stress for too long can contribute to multiple health conditions and disease including all the above mentioned ones.  Good stressors (known as Eustress) such as deadlines, exercise, competition, and exams can help us grow and improve our overall health and well-being.  But bad stressors (known as Distress) such as trauma, relationship problems, working too many hours, financial problems, and housing/food  Rectal, Daily  docusate sodium (COLACE) capsule 100 mg, 100 mg, Oral, Daily PRN  [START ON 5/22/2025] ferrous sulfate (IRON 325) tablet 325 mg, 325 mg, Oral, Daily with breakfast  [START ON 5/22/2025] folic acid (FOLVITE) tablet 1 mg, 1 mg, Oral, Daily  lactulose (CHRONULAC) 10 GM/15ML solution 20 g, 20 g, Oral, Daily PRN  insulin lispro (HUMALOG,ADMELOG) injection vial 0-4 Units, 0-4 Units, SubCUTAneous, 4x Daily AC & HS  ipratropium 0.5 mg-albuterol 2.5 mg (DUONEB) nebulizer solution 1 Dose, 1 Dose, Inhalation, Q4H WA RT  methylPREDNISolone sodium succ (SOLU-MEDROL) injection 40 mg, 40 mg, IntraVENous, Q12H  [START ON 5/22/2025] warfarin placeholder: dosing by pharmacy, , Oral, RX Placeholder  calcium gluconate 10 % injection 1,000 mg, 1,000 mg, IntraVENous, Once  insulin regular (HumuLIN R;NovoLIN R) injection 5 Units, 5 Units, IntraVENous, Once  bumetanide (BUMEX) 12.5 mg in sodium chloride 0.9 % 125 mL infusion, 0.2 mg/hr, IntraVENous, Continuous  sodium bicarbonate 150 mEq in dextrose 5 % 1,000 mL infusion, , IntraVENous, Continuous  sodium zirconium cyclosilicate (LOKELMA) oral suspension 10 g, 10 g, Oral, Once  fludrocortisone (FLORINEF) tablet 0.1 mg, 0.1 mg, Oral, Daily  albuterol (ACCUNEB) nebulizer solution 5 mg, 5 mg, Nebulization, Once  Allergies:  Meperidine, Rosuvastatin, Dilaudid [hydromorphone hcl], and Ultram [tramadol]    Social History:    TOBACCO:   reports that he has been smoking cigars. He has never used smokeless tobacco.  ETOH:   reports current alcohol use.    Family History:       Problem Relation Age of Onset    High Blood Pressure Mother     Colon Cancer Mother     Heart Attack Father      REVIEW OF SYSTEMS:      CONSTITUTIONAL:  negative for  fevers and chills  EYES:  negative  HEENT:  negative  RESPIRATORY:  positive for  dyspnea  CARDIOVASCULAR:  negative  GASTROINTESTINAL:  negative  GENITOURINARY:  negative  INTEGUMENT/BREAST:  negative  HEMATOLOGIC/LYMPHATIC:   insecurities can cause generalized anxiety disorder, depression, GI symptoms, pain, high blood pressure, heart disease, diabetes, and obesity.  Most important is security in your home, food, and finances. Next, eliminate stressful and unnecessary relationships and activities in your life.  Third, ensure 7-8 hours of sleep every night.  Finally, add activities that can help you manage the remaining stress such as meditation, exercise, and planned relaxation.      Connection:  Social and spiritual connections help fulfill basic psychological needs and are essential to our overall health. Research shows that the single most important predictor of human happiness and long life is having strong social connections.  Loneliness has been associated with increased rates of depression and suicidal and social support systems have been shown to decrease these rates.  Having healthy and fulfilling relationships with our family, friends, and coworkers will reduce stress and substance use.  Start with being intentional with your connections, improving essential ones such as with our children, parents, or siblings, and then seeking out new connections in areas that are needed such as friendships, support groups, and spiritual communities.      Substance Use:  Quit substances that are obviously unhealthy such as using tobacco, recreational drugs, and alcohol.  No amount of tobacco use or vaping is considered healthy. And quitting is perhaps the most powerful change you can make long-term.  Regardless of the medical benefits of some recreational drugs such as cannabis or psilocybin, regular use of these substances have negative long-term health consequences.  While some studies show a mild benefit from an occasional glass of wine, its benefits can be found elsewhere by taking in more fruits daily. Therefore, the negative effects of alcohol are not worth its consumption and therefore should be limited to a couple times a month  maximum.      Supplement:  Studies have shown the 4 most beneficial supplements that most Americans are deficient in are: Multivitamin/Mineral, Omega 3/Fish Oils, Vitamin D, and Probiotics.  These supplements provide foundational nutritional and health factors that the current American diet and lifestyle don't provide or steal from you.  I strongly recommend purchasing these basic supplements from a reputable source such as the [employer].  Multivitamin dosing varies, but Omega 3 Oils should be 2g twice daily, Vitamin D3 5000iu daily from Oct-April, and Probiotics 25-50 billion live organisms twice daily with meals.    THEN WHAT:  Family physicians and other primary care providers (PCPs) have started to incorporate Lifestyle Medicine into their practices.  I as your PCP have started doing this and so if you feel you need more coaching in any of these areas of health and lifestyle, please schedule a 40 minute visit with me.  I will be very happy to start moving you towards a more healthful and sustainable lifestyle that addresses your health concerns and disease.  Also, see the separate handouts on each of the lifestyle factors that provide a more in-depth explanation of what to do.      Courtesy of [facility] and the [facility].     Integrative Approach To Overweight & Obesity    72% of American adults are overweight or obese and 20% of American children are obese.  And the numbers are only worsening.  Obesity is more than a number on a scale, its is a metabolic disorder that is the result of lifestyle and worsens conditions such as hypertension, diabetes, cardiovascular disease, fatty liver disease, osteoarthritis, and increases your risk for cancer and premature death.  As your physician, I strive to help you prevent (or reverse) these conditions and why I have provided you with this guide.    PLEASE NOTE: I understand weight, body image, and weight loss  are all sensitive topics.  I will be very sensitive in my approach, asking for permission to discuss and sticking with the health and medical aspects of the condition.   I have been helping patients lose weight and overcome obesity for 20 years and I have learned it is not a result of a character flaw or personal failure.  Obesity often results from a combination of lifestyle, genetics, environmental exposures, and underlying metabolic conditions (insulin resistance, hypothyroidism, etc).  Therefore, I invite you to continue reading about the most effective long-term approach to reverse your obesity, reduce your risk of cancer and premature death, and most importantly improve your quality of life.     THE BASICS OF GOOD SLEEP    Good sleep is critical for health and wellness.  Sleep restores the mind, body, and spirit.  Research consistently shows 7-8 hours of uninterrupted sleep is the “sweet spot” and helps to prevent weight gain, diabetes, and premature death.    Proper Sleep Improves:  Energy  Stress Management  Memory/Cognition/Learning  Body Composition  Blood Sugar Regulation  Mood/Depression  Steps to Improving Sleep Hygiene  Black out the room you sleep in  Limit or eliminate all stimulants (caffeine, nicotine, chocolate, sugar)  Turn off screens (TV, Computer, Phone, iPad) at least 1 hour before bedtime  Try calming and relaxing activities such as reading, writing, meditating for the 30-60 minutes prior to bedtime  Avoid Naps  Limit exercise to more than 2 hours prior to bedtime  Reduce fluid intake after dinner  Eat dinner at least 3 hours before bedtime  Other things to consider:  Try a liquid form (dropper, capsule) of Melatonin, 2-5 mg 1 hour prior to bedtime. Tablet form is fine too  Begin a regular exercise program (if not already exercising)  Supplements that contain magnesium citrate, passionflower, and valerian (see supplement guidelines).  Discuss a sleep study with your primary care physician,  especially if you snore.  Try CBTi, a form of cognitive behavioral therapy for insomnia (website below).  Websites:  https://www.nhlbi.nih.gov/files/docs/public/sleep/healthy_sleep.pdf   https://[facility].nih.gov/health/sleep  http://www.cbtforinsomnia.com   www.[facility].edu/files/webfm-uploads/documents/outreach/im/handout_sleep.pdf

## 2025-05-21 NOTE — H&P
[facility]   History and Physical      CHIEF COMPLAINT:  SOB, fatigue    History of Present Illness:  69 y.o. male with a history of HTN, HLD, DM, CAD, PE on coumadin, CHF presents with SOB, fatigue.  Patient resides at [facility] for rehab following trimalleolar frx of L ankle/foot.  Per staff, SpO2 in 60s yesterday afternoon.  Family reports that patient is more lethargic.  Cough also reported with no noted sputum production.  Denies any chest pain.  Denies any nausea/vomiting.  Patient does have history of bilateral PE for which she is on Coumadin.  Placed on BiPAP in ED.    ED imaging: CT head showed no acute intracranial abnormality.  CT chest showed increased markings in left lung base, cholelithiasis, stable heterogeneous enlargement and nodularity fluid left lobe of thyroid, stable enlarged ascending thoracic aorta  Pertinent abnormal labs: RBC 6.04, Hgb 11.9, MCV 74.7, K6.8, repeat 5.1, CO2 31, anion gap 6, glucose 144, BUN 40, creatinine 2.0 (baseline 1.3-1.56), GFR 36, calcium 8.5, total protein 6.3, albumin 3.3, BNP 2780, troponin 138, repeat 135, UA positive for glucose, greater than 300 protein, 3-5 fine granular casts, trace bacteria.  UCx, respiratory panel, and blood cultures pending most recent ABG: pH 7.246, PCO2 70.9, PO2 61.4, HCO3 30.1 on 3L NC.  ED course: 10 g Lokelma, 100 mEq sodium bicarbonate, 1 g calcium, 10 units IV insulin, 250 mL D10, DuoNeb, 125 mg Solu-Medrol, 2000 mg cefepime, 100 mg doxycycline.     Of note, recent admission 3/14 - 3/19 for hypoxic respiratory failure and metabolic encephalopathy.    Informant(s) for H&P:Pt and EMR    REVIEW OF SYSTEMS:  SOB. no fevers, chills, cp, n/v, ha, vision/hearing changes, wt changes, hot/cold flashes, other open skin lesions, diarrhea, constipation, dysuria/hematuria unless noted in HPI. Complete ROS performed with the patient and is otherwise negative.      PMH:  Past Medical History:   Diagnosis Date

## 2025-05-21 NOTE — PROGRESS NOTES
Database initiated. Patient is A&O comes in from [facility]. He uses a wheelchair and has a brace to LLE. He is RA at baseline but wears a bi-pap while asleep. Medications and Full Code status verified using facility paperwork.

## 2025-05-21 NOTE — ED NOTES
ED to Inpatient Handoff Report    Notified Liv that electronic handoff available and patient ready for transport to room 447.    Safety Risks: Home safety issues, Difficulty with daily activities, and Risk of falls    Patient in Restraints: no    Constant Observer or Patient : no    Telemetry Monitoring Ordered :Yes           Order to transfer to unit without monitor:N/A    Last MEWS: 1 Time completed: 0634    Deterioration Index Score:   Predictive Model Details          41 (Caution)  Factor Value    Calculated 5/21/2025 06:35 27% Age 69 years old    Deterioration Index Model 26% Supplemental oxygen PAP (positive airway pressure)     13% Respiratory rate 20     12% Potassium abnormal (5.1 mmol/L)     12% Blood pH abnormal (7.246)     5% Sodium 143 mmol/L     2% BUN abnormal (40 mg/dL)     1% Systolic 118     1% Pulse 87     0% Temperature 99 °F (37.2 °C)     0% Pulse oximetry 97 %     0% Hematocrit 45.1 %     0% WBC count 5.9 k/uL        Vitals:    05/21/25 0418 05/21/25 0542 05/21/25 0631 05/21/25 0634   BP:  (!) 98/47 118/87 118/87   Pulse: 94 86 87 87   Resp: 17 13 20 20   Temp:    99 °F (37.2 °C)   TempSrc:       SpO2: 100% 94% 97% 97%   Weight:       Height:             Opportunity for questions and clarification was provided.

## 2025-05-21 NOTE — CONSULTS
Pulmonary Consultation    Admit Date: 5/20/2025  Requesting Physician: Geoff Kauffman DO        Reason for consultation:  Respiratory failure.         HPI:  Patient is a 69 year old male who presents to emergency room with complaints of shortness of breath and hypoxia. He had a CTA that revealed a left lower lobe infiltrate. His procalcitonin level is mildly elevated and elevated pro-BNP level over 2,000. He does not have any leukocytosis. Patient was hypercapnic on admission and has been on continuous Bipap. He did test positive for rhinovirus. Patient has received a 1 L normal saline, aerosols, cefepime and doxycycline.     Patient denies any history of smoking. He does have underlying sleep apnea and admits to complying with NIV at the nursing home. He does have a history of pulmonary embolism. He takes coumadin. His INR was supratherapeutic on admission. He worked in real estate development.     Patient is seen on NIV. He is asking for machine to be off during assessment. Patient admits to wheezing that began a couple days ago. He has a cough with green secretions. He denies any swallowing difficulty.       PMH:    Past Medical History:   Diagnosis Date    Arthritis     Hips, possible shoulders and knees    CAD (coronary artery disease)     Cellulitis 11/2016    right leg    Diabetes mellitus (HCC)     Hyperlipidemia     Hypertension     Kidney failure     Morbid obesity with body mass index (BMI) of 60.0 to 69.9 in adult (HCC)     Pulmonary embolism (HCC)     Sleep apnea          PSH:   Past Surgical History:   Procedure Laterality Date    ANKLE SURGERY Left 10/16/2024    Left Ankle Irrigation and Debridement with Placement of Left Ankle Spanning External Fixator performed by Jose Morrissey DO [place] [facility]    CARDIAC SURGERY  05/13/2019    ACB x 5 at [facility]    COLONOSCOPY  01/24/2006    ENDOSCOPY, COLON, DIAGNOSTIC      THROAT SURGERY      TONSILLECTOMY      WISDOM TOOTH

## 2025-05-21 NOTE — PROGRESS NOTES
Chart reviewed, full consultation to follow.    Brief Mr. Mcdonald is a 69-year-old  man with history of CKD stage IIIa, with severe proteinuria, probably related to diabetic kidney disease, baseline creatinine 1.3-1.5 mg/dL, HTN, type 2 DM, CAD status post CABG, HFpEF 60%, PE, SUKHWINDER on BiPAP, morbid obesity, hyperlipidemia, BMI >54, trimalleolar fracture of the left ankle/foot, who was admitted on 5/20/2025 after he was referred to the ER from [facility] for evaluation of hypoxemia, chest x-ray showed mild pulmonary edema, CT chest without contrast showed some increased markings in the left lung base concerning for possible aspiration, his proBNP was 2790, his ABG showed a pH of 7.246 with a PCO2 70.9, he was admitted with a diagnosis of acute respiratory failure and left lower lobe pneumonia, he has well was found to have rhinovirus.  On admission his potassium level was 5.6, creatinine 1.41, since admission his creatinine level has increased up to 1.9 mg/dL and his potassium is up to 7.3 meq/L although it is a hemolyzed sample, reasons for this consultation.  Prior to admission his medications included Lasix 20 mg p.o. daily.  Patient reports in the last 2 weeks or so he has gained significant amount of weight.    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 daily x 3 days  Monitor

## 2025-05-21 NOTE — PROGRESS NOTES
Pharmacy Consultation Note  (Warfarin Dosing and Monitoring)    Initial consult date: 5/21/2025  Consulting Provider: Brinda López, JAMIE-JING    Daniel Mcdonald is a 69 y.o. male for whom pharmacy has been asked to manage warfarin therapy.     Weight:   Wt Readings from Last 1 Encounters:   05/20/25 (!) 173.7 kg (383 lb)       TSH:    Lab Results   Component Value Date/Time    TSH 1.19 05/20/2025 11:57 PM       Hepatic Function Panel:                            Lab Results   Component Value Date/Time    ALKPHOS 78 05/20/2025 11:57 PM    ALT 8 05/20/2025 11:57 PM    AST 17 05/20/2025 11:57 PM    BILITOT 0.4 05/20/2025 11:57 PM    BILIDIR 0.1 04/03/2025 05:20 AM       Current significant warfarin drug-drug interactions include: steroids (variable/dose dependent)    Recent Labs     05/19/25  0625 05/20/25  2357   HGB 11.0* 11.9*     --      Date Warfarin Dose INR Heparin or LMWH Comment   5/21 1 mg 3.2  (5/20) --                                  Assessment:  Patient is a 69 y.o. male on warfarin for History of  VTE/PE.  Patient's home warfarin dosing regimen is documented as warfarin 3 mg daily per SNF documentation.   Goal INR 2 - 3  INR 3.2 yesterday (unable to attain sample today)    Plan:  Will give warfarin 1 mg tonight  Daily PT/INR until the INR is stable within the therapeutic range  Pharmacist will follow and monitor/adjust dosing as necessary    Sharath Lopez, PharmD, BCCCP  5/21/2025  3:11 PM    TIMMY: [phone]  SEY: [phone]  SJW: [phone]

## 2025-05-21 NOTE — BSMART NOTE
4 Eyes Skin Assessment     NAME:  Daniel Mcdonald  YOB: 1955  MEDICAL RECORD NUMBER:  [id number]    The patient is being assessed for  Admission    I agree that at least one RN has performed a thorough Head to Toe Skin Assessment on the patient. ALL assessment sites listed below have been assessed.      Areas assessed by both nurses:    Head, Face, Ears, Shoulders, Back, Chest, Arms, Elbows, Hands, Sacrum. Buttock, Coccyx, Ischium, Legs. Feet and Heels, and Under Medical Devices         Does the Patient have a Wound? Yes wound(s) were present on assessment. LDA wound assessment was Initiated and completed by RN       Lupillo Prevention initiated by RN: Yes  Wound Care Orders initiated by RN: Yes    Pressure Injury (Stage 3,4, Unstageable, DTI, NWPT, and Complex wounds) if present, place Wound referral order by RN under : Yes    New Ostomies, if present place, Ostomy referral order under : No     Nurse 1 eSignature: Electronically signed by Shara Amaral RN on 5/21/25 at 9:38 AM EDT    **SHARE this note so that the co-signing nurse can place an eSignature**    Nurse 2 eSignature: {Esignature:[id number]}

## 2025-05-22 PROBLEM — R19.7 DIARRHEA OF PRESUMED INFECTIOUS ORIGIN: Status: ACTIVE | Noted: 2025-05-22

## 2025-05-22 LAB
ANION GAP SERPL CALCULATED.3IONS-SCNC: 10 MMOL/L (ref 7–16)
ANION GAP SERPL CALCULATED.3IONS-SCNC: 10 MMOL/L (ref 7–16)
ANION GAP SERPL CALCULATED.3IONS-SCNC: 7 MMOL/L (ref 7–16)
BASOPHILS # BLD: 0.01 K/UL (ref 0–0.2)
BASOPHILS NFR BLD: 0 % (ref 0–2)
BUN SERPL-MCNC: 57 MG/DL (ref 6–23)
BUN SERPL-MCNC: 58 MG/DL (ref 6–23)
BUN SERPL-MCNC: 62 MG/DL (ref 6–23)
CALCIUM SERPL-MCNC: 8.1 MG/DL (ref 8.6–10.2)
CALCIUM SERPL-MCNC: 8.2 MG/DL (ref 8.6–10.2)
CALCIUM SERPL-MCNC: 8.3 MG/DL (ref 8.6–10.2)
CHLORIDE SERPL-SCNC: 100 MMOL/L (ref 98–107)
CHLORIDE SERPL-SCNC: 102 MMOL/L (ref 98–107)
CHLORIDE SERPL-SCNC: 99 MMOL/L (ref 98–107)
CO2 SERPL-SCNC: 28 MMOL/L (ref 22–29)
CO2 SERPL-SCNC: 28 MMOL/L (ref 22–29)
CO2 SERPL-SCNC: 29 MMOL/L (ref 22–29)
CREAT SERPL-MCNC: 1.9 MG/DL (ref 0.7–1.2)
CREAT SERPL-MCNC: 1.9 MG/DL (ref 0.7–1.2)
CREAT SERPL-MCNC: 2 MG/DL (ref 0.7–1.2)
EKG ATRIAL RATE: 82 BPM
EKG P AXIS: 38 DEGREES
EKG P-R INTERVAL: 156 MS
EKG Q-T INTERVAL: 384 MS
EKG QRS DURATION: 86 MS
EKG QTC CALCULATION (BAZETT): 448 MS
EKG R AXIS: 63 DEGREES
EKG T AXIS: 74 DEGREES
EKG VENTRICULAR RATE: 82 BPM
EOSINOPHIL # BLD: 0 K/UL (ref 0.05–0.5)
EOSINOPHILS RELATIVE PERCENT: 0 % (ref 0–6)
ERYTHROCYTE [DISTWIDTH] IN BLOOD BY AUTOMATED COUNT: 24.7 % (ref 11.5–15)
GFR, ESTIMATED: 36 ML/MIN/1.73M2
GFR, ESTIMATED: 38 ML/MIN/1.73M2
GFR, ESTIMATED: 38 ML/MIN/1.73M2
GLUCOSE BLD-MCNC: 241 MG/DL (ref 74–99)
GLUCOSE BLD-MCNC: 258 MG/DL (ref 74–99)
GLUCOSE BLD-MCNC: 356 MG/DL (ref 74–99)
GLUCOSE BLD-MCNC: 418 MG/DL (ref 74–99)
GLUCOSE BLD-MCNC: 420 MG/DL (ref 74–99)
GLUCOSE SERPL-MCNC: 241 MG/DL (ref 74–99)
GLUCOSE SERPL-MCNC: 362 MG/DL (ref 74–99)
GLUCOSE SERPL-MCNC: 447 MG/DL (ref 74–99)
HCT VFR BLD AUTO: 37.6 % (ref 37–54)
HGB BLD-MCNC: 10.6 G/DL (ref 12.5–16.5)
INR PPP: 5.6
L PNEUMO1 AG UR QL IA.RAPID: NEGATIVE
LYMPHOCYTES NFR BLD: 0.46 K/UL (ref 1.5–4)
LYMPHOCYTES RELATIVE PERCENT: 8 % (ref 20–42)
MCH RBC QN AUTO: 20.2 PG (ref 26–35)
MCHC RBC AUTO-ENTMCNC: 28.2 G/DL (ref 32–34.5)
MCV RBC AUTO: 71.5 FL (ref 80–99.9)
MICROORGANISM SPEC CULT: NO GROWTH
MONOCYTES NFR BLD: 0.38 K/UL (ref 0.1–0.95)
MONOCYTES NFR BLD: 6 % (ref 2–12)
NEUTROPHILS NFR BLD: 86 % (ref 43–80)
NEUTS SEG NFR BLD: 5.25 K/UL (ref 1.8–7.3)
PLATELET, FLUORESCENCE: 365 K/UL (ref 130–450)
PMV BLD AUTO: ABNORMAL FL (ref 7–12)
POTASSIUM SERPL-SCNC: 5.3 MMOL/L (ref 3.5–5)
POTASSIUM SERPL-SCNC: 5.4 MMOL/L (ref 3.5–5)
POTASSIUM SERPL-SCNC: 5.8 MMOL/L (ref 3.5–5)
PROTHROMBIN TIME: 61.1 SEC (ref 9.3–12.4)
RBC # BLD AUTO: 5.26 M/UL (ref 3.8–5.8)
RBC # BLD: ABNORMAL 10*6/UL
S PNEUM AG SPEC QL: NEGATIVE
SERVICE CMNT-IMP: NORMAL
SODIUM SERPL-SCNC: 137 MMOL/L (ref 132–146)
SODIUM SERPL-SCNC: 138 MMOL/L (ref 132–146)
SODIUM SERPL-SCNC: 138 MMOL/L (ref 132–146)
SPECIMEN DESCRIPTION: NORMAL
SPECIMEN SOURCE: NORMAL
WBC OTHER # BLD: 6.1 K/UL (ref 4.5–11.5)

## 2025-05-22 PROCEDURE — 6370000000 HC RX 637 (ALT 250 FOR IP): Performed by: NURSE PRACTITIONER

## 2025-05-22 PROCEDURE — 99233 SBSQ HOSP IP/OBS HIGH 50: CPT | Performed by: STUDENT IN AN ORGANIZED HEALTH CARE EDUCATION/TRAINING PROGRAM

## 2025-05-22 PROCEDURE — 6370000000 HC RX 637 (ALT 250 FOR IP): Performed by: INTERNAL MEDICINE

## 2025-05-22 PROCEDURE — 6370000000 HC RX 637 (ALT 250 FOR IP): Performed by: STUDENT IN AN ORGANIZED HEALTH CARE EDUCATION/TRAINING PROGRAM

## 2025-05-22 PROCEDURE — 6370000000 HC RX 637 (ALT 250 FOR IP)

## 2025-05-22 PROCEDURE — 6360000002 HC RX W HCPCS

## 2025-05-22 PROCEDURE — 2580000003 HC RX 258

## 2025-05-22 PROCEDURE — 87324 CLOSTRIDIUM AG IA: CPT

## 2025-05-22 PROCEDURE — 94660 CPAP INITIATION&MGMT: CPT

## 2025-05-22 PROCEDURE — 97161 PT EVAL LOW COMPLEX 20 MIN: CPT

## 2025-05-22 PROCEDURE — 2500000003 HC RX 250 WO HCPCS

## 2025-05-22 PROCEDURE — 36415 COLL VENOUS BLD VENIPUNCTURE: CPT

## 2025-05-22 PROCEDURE — 85610 PROTHROMBIN TIME: CPT

## 2025-05-22 PROCEDURE — 80048 BASIC METABOLIC PNL TOTAL CA: CPT

## 2025-05-22 PROCEDURE — 2580000003 HC RX 258: Performed by: INTERNAL MEDICINE

## 2025-05-22 PROCEDURE — 82962 GLUCOSE BLOOD TEST: CPT

## 2025-05-22 PROCEDURE — 97165 OT EVAL LOW COMPLEX 30 MIN: CPT

## 2025-05-22 PROCEDURE — 2060000000 HC ICU INTERMEDIATE R&B

## 2025-05-22 PROCEDURE — 85025 COMPLETE CBC W/AUTO DIFF WBC: CPT

## 2025-05-22 PROCEDURE — 87449 NOS EACH ORGANISM AG IA: CPT

## 2025-05-22 PROCEDURE — 93010 ELECTROCARDIOGRAM REPORT: CPT | Performed by: INTERNAL MEDICINE

## 2025-05-22 PROCEDURE — 94640 AIRWAY INHALATION TREATMENT: CPT

## 2025-05-22 PROCEDURE — 2500000003 HC RX 250 WO HCPCS: Performed by: INTERNAL MEDICINE

## 2025-05-22 RX ORDER — PANTOPRAZOLE SODIUM 20 MG/1
20 TABLET, DELAYED RELEASE ORAL ONCE
Status: DISCONTINUED | OUTPATIENT
Start: 2025-05-22 | End: 2025-05-27

## 2025-05-22 RX ORDER — BUDESONIDE 0.5 MG/2ML
0.5 INHALANT ORAL
Status: DISCONTINUED | OUTPATIENT
Start: 2025-05-22 | End: 2025-05-27 | Stop reason: HOSPADM

## 2025-05-22 RX ORDER — PANTOPRAZOLE SODIUM 40 MG/1
40 TABLET, DELAYED RELEASE ORAL DAILY
Status: DISCONTINUED | OUTPATIENT
Start: 2025-05-23 | End: 2025-05-27 | Stop reason: HOSPADM

## 2025-05-22 RX ORDER — INSULIN LISPRO 100 [IU]/ML
10 INJECTION, SOLUTION INTRAVENOUS; SUBCUTANEOUS ONCE
Status: COMPLETED | OUTPATIENT
Start: 2025-05-22 | End: 2025-05-22

## 2025-05-22 RX ORDER — LOPERAMIDE HYDROCHLORIDE 2 MG/1
2 CAPSULE ORAL 4 TIMES DAILY PRN
Status: DISCONTINUED | OUTPATIENT
Start: 2025-05-22 | End: 2025-05-26

## 2025-05-22 RX ORDER — LACTULOSE 10 G/15ML
20 SOLUTION ORAL DAILY PRN
Status: DISCONTINUED | OUTPATIENT
Start: 2025-05-22 | End: 2025-05-27 | Stop reason: HOSPADM

## 2025-05-22 RX ORDER — ARFORMOTEROL TARTRATE 15 UG/2ML
15 SOLUTION RESPIRATORY (INHALATION)
Status: DISCONTINUED | OUTPATIENT
Start: 2025-05-22 | End: 2025-05-27 | Stop reason: HOSPADM

## 2025-05-22 RX ORDER — DOCUSATE SODIUM 100 MG/1
100 CAPSULE, LIQUID FILLED ORAL DAILY PRN
Status: DISCONTINUED | OUTPATIENT
Start: 2025-05-22 | End: 2025-05-26

## 2025-05-22 RX ADMIN — METHYLPREDNISOLONE SODIUM SUCCINATE 40 MG: 40 INJECTION, POWDER, LYOPHILIZED, FOR SOLUTION INTRAMUSCULAR; INTRAVENOUS at 13:21

## 2025-05-22 RX ADMIN — ACETAMINOPHEN 650 MG: 325 TABLET ORAL at 17:08

## 2025-05-22 RX ADMIN — SODIUM BICARBONATE: 84 INJECTION, SOLUTION INTRAVENOUS at 13:23

## 2025-05-22 RX ADMIN — ARFORMOTEROL TARTRATE 15 MCG: 15 SOLUTION RESPIRATORY (INHALATION) at 13:12

## 2025-05-22 RX ADMIN — ARFORMOTEROL TARTRATE 15 MCG: 15 SOLUTION RESPIRATORY (INHALATION) at 19:54

## 2025-05-22 RX ADMIN — MICONAZOLE NITRATE 1 APPLICATION: 20 POWDER TOPICAL at 20:55

## 2025-05-22 RX ADMIN — PANTOPRAZOLE SODIUM 20 MG: 20 TABLET, DELAYED RELEASE ORAL at 09:19

## 2025-05-22 RX ADMIN — FOLIC ACID 1 MG: 1 TABLET ORAL at 09:19

## 2025-05-22 RX ADMIN — IPRATROPIUM BROMIDE AND ALBUTEROL SULFATE 1 DOSE: .5; 2.5 SOLUTION RESPIRATORY (INHALATION) at 10:33

## 2025-05-22 RX ADMIN — INSULIN LISPRO 10 UNITS: 100 INJECTION, SOLUTION INTRAVENOUS; SUBCUTANEOUS at 22:01

## 2025-05-22 RX ADMIN — IPRATROPIUM BROMIDE AND ALBUTEROL SULFATE 1 DOSE: .5; 2.5 SOLUTION RESPIRATORY (INHALATION) at 19:54

## 2025-05-22 RX ADMIN — DOXYCYCLINE 100 MG: 100 INJECTION, POWDER, LYOPHILIZED, FOR SOLUTION INTRAVENOUS at 17:13

## 2025-05-22 RX ADMIN — SODIUM ZIRCONIUM CYCLOSILICATE 10 G: 10 POWDER, FOR SUSPENSION ORAL at 10:58

## 2025-05-22 RX ADMIN — BUDESONIDE 500 MCG: 0.5 SUSPENSION RESPIRATORY (INHALATION) at 13:12

## 2025-05-22 RX ADMIN — BUDESONIDE 500 MCG: 0.5 SUSPENSION RESPIRATORY (INHALATION) at 19:54

## 2025-05-22 RX ADMIN — IPRATROPIUM BROMIDE AND ALBUTEROL SULFATE 1 DOSE: .5; 2.5 SOLUTION RESPIRATORY (INHALATION) at 13:11

## 2025-05-22 RX ADMIN — ASPIRIN 81 MG: 81 TABLET, COATED ORAL at 17:08

## 2025-05-22 RX ADMIN — ASPIRIN 81 MG: 81 TABLET, COATED ORAL at 09:19

## 2025-05-22 RX ADMIN — FLUDROCORTISONE ACETATE 0.1 MG: 0.1 TABLET ORAL at 09:19

## 2025-05-22 RX ADMIN — INSULIN LISPRO 4 UNITS: 100 INJECTION, SOLUTION INTRAVENOUS; SUBCUTANEOUS at 17:08

## 2025-05-22 RX ADMIN — SODIUM CHLORIDE, PRESERVATIVE FREE 10 ML: 5 INJECTION INTRAVENOUS at 09:19

## 2025-05-22 RX ADMIN — DOXYCYCLINE 100 MG: 100 INJECTION, POWDER, LYOPHILIZED, FOR SOLUTION INTRAVENOUS at 05:19

## 2025-05-22 RX ADMIN — SODIUM CHLORIDE, PRESERVATIVE FREE 10 ML: 5 INJECTION INTRAVENOUS at 20:55

## 2025-05-22 RX ADMIN — INSULIN LISPRO 4 UNITS: 100 INJECTION, SOLUTION INTRAVENOUS; SUBCUTANEOUS at 20:53

## 2025-05-22 RX ADMIN — CEFEPIME 1000 MG: 1 INJECTION, POWDER, FOR SOLUTION INTRAMUSCULAR; INTRAVENOUS at 04:41

## 2025-05-22 RX ADMIN — MICONAZOLE NITRATE 1 APPLICATION: 20 POWDER TOPICAL at 09:19

## 2025-05-22 RX ADMIN — FERROUS SULFATE TAB 325 MG (65 MG ELEMENTAL FE) 325 MG: 325 (65 FE) TAB at 09:19

## 2025-05-22 RX ADMIN — SODIUM CHLORIDE, PRESERVATIVE FREE 10 ML: 5 INJECTION INTRAVENOUS at 00:15

## 2025-05-22 RX ADMIN — CEFEPIME 2000 MG: 2 INJECTION, POWDER, FOR SOLUTION INTRAVENOUS at 17:28

## 2025-05-22 RX ADMIN — METHYLPREDNISOLONE SODIUM SUCCINATE 40 MG: 40 INJECTION, POWDER, LYOPHILIZED, FOR SOLUTION INTRAMUSCULAR; INTRAVENOUS at 00:15

## 2025-05-22 RX ADMIN — LOPERAMIDE HYDROCHLORIDE 2 MG: 2 CAPSULE ORAL at 17:28

## 2025-05-22 RX ADMIN — TAMSULOSIN HYDROCHLORIDE 0.4 MG: 0.4 CAPSULE ORAL at 09:19

## 2025-05-22 RX ADMIN — INSULIN LISPRO 4 UNITS: 100 INJECTION, SOLUTION INTRAVENOUS; SUBCUTANEOUS at 10:58

## 2025-05-22 RX ADMIN — INSULIN LISPRO 1 UNITS: 100 INJECTION, SOLUTION INTRAVENOUS; SUBCUTANEOUS at 05:55

## 2025-05-22 ASSESSMENT — PAIN SCALES - GENERAL: PAINLEVEL_OUTOF10: 0

## 2025-05-22 NOTE — PROGRESS NOTES
Occupational Therapy    OCCUPATIONAL THERAPY INITIAL EVALUATION    [facility]   [address] [place], [place]         Date:2025                                                  Patient Name: Daniel Mcdonald    MRN: [id number]    : 1955    Room: [address] [place] [address]      Evaluating OT: Hazel Briggs OTR/L   [id number]      Referring Provider:Brinda López     Specific Provider Orders/Date:OT eval and treat 2025      Diagnosis:  Hyperkalemia [E87.5]  Urinary retention [R33.9]  REYNOLD (acute kidney injury) [N17.9]  Acute respiratory failure with hypoxia and hypercapnia (HCC) [J96.01, J96.02]  Wound of foot [S91.309A]     Pertinent Medical History: ankle surgery 10/2024 , CAD, arthritis, HTN,  CABG      Precautions:  Fall Risk, WBAT L LE with CAM boot      Assessment of current deficits    [x] Functional mobility  [x]ADLs  [x] Strength               [x]Cognition    [x] Functional transfers   [x] IADLs         [x] Safety Awareness   [x]Endurance    [x] Fine Coordination              [x] Balance      [] Vision/perception   [x]Sensation     []Gross Motor Coordination  [] ROM  [] Delirium                   [] Motor Control     OT PLAN OF CARE   OT POC based on physician orders, patient diagnosis and results of clinical assessment    Frequency/Duration  2-3 days/wk PRN   Specific OT Treatment Interventions to include:   ADL retraining/adapted techniques and AE recommendations to increase functional independence within precautions                    Energy conservation techniques to improve tolerance for selfcare routine   Functional transfer/mobility training/DME recommendations for increased independence, safety and fall prevention         Patient/family education to increase safety and functional independence             Environmental modifications for safe mobility and completion of ADLs                             Therapeutic activity to improve  arrival patient lying in bed .  At end of session, patient remained in bed  with call light and phone within reach, all lines and tubes intact.  *ALARM ON     Overall patient demonstrated  decreased independence and safety during completion of ADL/functional transfer/mobility tasks.  Pt would benefit from continued skilled OT to increase safety and independence with completion of ADL/IADL tasks for functional independence and quality of life.        Rehab Potential: limited for established goals     Patient / Family Goal: none stated       Patient and/or family were instructed on functional diagnosis, prognosis/goals and OT plan of care. Demonstrated fair +  understanding.     Eval Complexity: Low    Time In: 1142  Time Out: 1150    Min Units   OT Eval Low 97165 x  1   OT Eval Medium 08682      OT Eval High 64237      OT Re-Eval 81758       Therapeutic Ex 85807      Therapeutic Activities 41735       ADL/Self Care 35333       Orthotic Management 08105       Manual 56316     Neuro Re-Ed 54802       Non-Billable Time       OT Re eval 09453        Evaluation Time additionally includes thorough review of current medical information, gathering information on past medical history/social history and prior level of function, interpretation of standardized testing/informal observation of tasks, assessment of data and development of plan of care and goals.            Hazel Briggs  OTR/L  OT [id number]

## 2025-05-22 NOTE — PROGRESS NOTES
Physical Therapy  Facility/Department: [address] INTERMEDIATE 1  Physical Therapy Initial Assessment    Name: Daniel Mcdonald  : 1955  MRN: [id number]  Date of Service: 2025        Patient Diagnosis(es): The primary encounter diagnosis was Acute respiratory failure with hypoxia and hypercapnia (HCC). Diagnoses of Hyperkalemia, REYNOLD (acute kidney injury), Wound of foot, and Urinary retention were also pertinent to this visit.  Past Medical History:  has a past medical history of Arthritis, CAD (coronary artery disease), Cellulitis, Diabetes mellitus (HCC), Hyperlipidemia, Hypertension, Kidney failure, Morbid obesity with body mass index (BMI) of 60.0 to 69.9 in adult (HCC), Pulmonary embolism (HCC), and Sleep apnea.  Past Surgical History:  has a past surgical history that includes Throat surgery; Endoscopy, colon, diagnostic; Colonoscopy (2006); [place] tooth extraction; Tonsillectomy; Cardiac surgery (2019); and Ankle surgery (Left, 10/16/2024).    Evaluating Therapist: Alma Pickering PT      Room #:  0447/0447-A  Diagnosis:  Hyperkalemia [E87.5]  Urinary retention [R33.9]  REYNOLD (acute kidney injury) [N17.9]  Acute respiratory failure with hypoxia and hypercapnia (HCC) [J96.01, J96.02]  Wound of foot [S91.309A]  PMHx/PSHx:  Cellulitis, DM, L ankle fx  Precautions:  falls, contact isolation, WBAT L LE with CAM boot      Social:  Pt admitted from SNF. Mariel lift for transfers to chair. Was working in standing with PT   Initial Evaluation  Date: 25 Treatment      Short Term/ Long Term   Goals   Was pt agreeable to Eval/treatment? Initially agreeable     Does pt have pain? No c/o pain     Bed Mobility  Rolling: NT  Supine<> long sit max assist of 2 to attempt, pt then declined stating he did not want to move due to diarrhea     Mod assist   Transfers Sit to stand: NT  Stand to sit: NT  Stand pivot: NT  N/A recommend mariel lift at this time   Ambulation    NT  N/A   Stair Negotiation  Ascended and  descended  NT   N/A   LE strength     3-/5    3+/5   balance            AM-PAC Raw score               6/24         Pt is alert and Oriented   LE ROM: WFL  Edema: B LEs     ASSESSMENT:    Pt displays functional ability as noted in the objective portion of this evaluation.      Patient education  Pt educated on PT objectives    Patient response to education:   Pt verbalized understanding Pt demonstrated skill Pt requires further education in this area   yes           Comments:  Pt initially agreeable to PT session. Allowed ROM and strength testing, when staring mobility pt then declined to move further due to frequent bowel movement.     Conditions Requiring Skilled Therapeutic Intervention:    [x]Decreased strength     []Decreased ROM  [x]Decreased functional mobility  [x]Decreased balance   [x]Decreased endurance   []Decreased posture  []Decreased sensation  []Decreased coordination   []Decreased vision  []Decreased safety awareness   []Increased pain       Patient and or family understand(s) diagnosis, prognosis, and plan of care.    Prognosis is fair for reaching above PT goals    PHYSICAL THERAPY PLAN OF CARE:    PT POC is established based on physician order and patient diagnosis     Referring provider/PT Order: Brinda López APRN - CNP / PT eval and treat      Current Treatment Recommendations:     [x] Strengthening to improve independence with functional mobility   [] ROM to improve independence with functional mobility   [x] Balance Training to improve static/dynamic balance and to reduce fall risk  [x] Endurance Training to improve activity tolerance during functional mobility   [] Transfer Training to improve safety and independence with all functional transfers   [] Gait Training to improve gait mechanics, endurance and assess need for appropriate assistive device  [] Stair Training in preparation for safe discharge home and/or into the community   [x] Positioning to prevent skin breakdown and

## 2025-05-22 NOTE — PROGRESS NOTES
Pharmacy Consultation Note  (Warfarin Dosing and Monitoring)    Initial consult date: 5/21/2025  Consulting Provider: Brinda López, JAMIE-JING    Daniel Mcdonald is a 69 y.o. male for whom pharmacy has been asked to manage warfarin therapy.     Weight:   Wt Readings from Last 1 Encounters:   05/22/25 (!) 173.7 kg (383 lb)       TSH:    Lab Results   Component Value Date/Time    TSH 1.19 05/20/2025 11:57 PM       Hepatic Function Panel:                            Lab Results   Component Value Date/Time    ALKPHOS 78 05/20/2025 11:57 PM    ALT 8 05/20/2025 11:57 PM    AST 17 05/20/2025 11:57 PM    BILITOT 0.4 05/20/2025 11:57 PM    BILIDIR 0.1 04/03/2025 05:20 AM       Current significant warfarin drug-drug interactions include: steroids (variable/dose dependent)    Recent Labs     05/20/25  2357 05/22/25  0430   HGB 11.9* 10.6*     Date Warfarin Dose INR Heparin or LMWH Comment   5/21 1 mg 3.2  (5/20) --    5/22 HOLD DOSE 5.6 --                           Assessment:  Patient is a 69 y.o. male on warfarin for History of  VTE/PE.  Patient's home warfarin dosing regimen is documented as warfarin 3 mg daily per SNF documentation.   Goal INR 2 - 3  INR 5.6 today    Plan:  Hold warfarin dosing tonight.   Daily PT/INR until the INR is stable within the therapeutic range  Pharmacist will follow and monitor/adjust dosing as necessary    Electronically signed by Shara Macedo RPH, PharmD, BCPS 5/22/2025 2:42 PM       TIMMY: [phone]  SEY: [phone]  SJW: [phone]

## 2025-05-22 NOTE — PROGRESS NOTES
RN skin check complete with Stacy SHELTON, no new skin issues at this time. Patient still adamantly refusing turns after education provided.

## 2025-05-22 NOTE — PROGRESS NOTES
[facility] Hospitalist Progress Note    Admitting Date and Time: 5/20/2025 10:38 PM  Admit Dx: Hyperkalemia [E87.5]  Urinary retention [R33.9]  REYNOLD (acute kidney injury) [N17.9]  Acute respiratory failure with hypoxia and hypercapnia (HCC) [J96.01, J96.02]  Wound of foot [S91.309A]    Subjective:  Patient is being followed for Hyperkalemia [E87.5]  Urinary retention [R33.9]  REYNOLD (acute kidney injury) [N17.9]  Acute respiratory failure with hypoxia and hypercapnia (HCC) [J96.01, J96.02]  Wound of foot [S91.309A]   Pt was seen and examined today.  Reporting some continued liquid diarrhea, had 7 episodes in the last 24 hours.    ROS: denies fever, chills, cp, sob, n/v, HA unless stated above.     cefepime  2,000 mg IntraVENous Q12H    [START ON 5/23/2025] pantoprazole  40 mg Oral Daily    pantoprazole  20 mg Oral Once    budesonide  0.5 mg Nebulization BID RT    arformoterol tartrate  15 mcg Nebulization BID RT    sodium chloride flush  5-40 mL IntraVENous 2 times per day    doxycycline (VIBRAMYCIN) IV  100 mg IntraVENous Q12H    aspirin  81 mg Oral BID    [Held by provider] furosemide  20 mg Oral Daily    [Held by provider] metoprolol tartrate  12.5 mg Oral BID    miconazole  1 Application Topical BID    tamsulosin  0.4 mg Oral Daily    ferrous sulfate  325 mg Oral Daily with breakfast    folic acid  1 mg Oral Daily    insulin lispro  0-4 Units SubCUTAneous 4x Daily AC & HS    ipratropium 0.5 mg-albuterol 2.5 mg  1 Dose Inhalation Q4H WA RT    methylPREDNISolone  40 mg IntraVENous Q12H    warfarin placeholder: dosing by pharmacy   Oral RX Placeholder    fludrocortisone  0.1 mg Oral Daily     docusate sodium, 100 mg, Daily PRN  lactulose, 20 g, Daily PRN  sodium chloride flush, 5-40 mL, PRN  sodium chloride, , PRN  ondansetron, 4 mg, Q8H PRN   Or  ondansetron, 4 mg, Q6H PRN  polyethylene glycol, 17 g, Daily PRN  acetaminophen, 650 mg, Q6H PRN   Or  acetaminophen, 650 mg, Q6H PRN  glucose, 4 tablet,

## 2025-05-22 NOTE — PROGRESS NOTES
Antibiotic Extended Infusion Policy     This patient is on medication that requires renal, weight, and/or indication dose adjustment.      Date Body Weight IBW  Adjusted BW SCr  CrCl Dialysis status BMI   5/22/2025 (!) 173.7 kg (383 lb) Ideal body weight: 73 kg (160 lb 15 oz)  Adjusted ideal body weight: 113.3 kg (249 lb 12.2 oz) Serum creatinine: 2 mg/dL (H) 05/22/25 0430  Estimated creatinine clearance: 55 mL/min (A) N/a Body mass index is 54.95 kg/m².        Pharmacy has dose-adjusted the following medication(s):    Ordered Medication: Cefepime 1000mg q12h     Order Changed/converted to: Cefepime 2000mg q12h    These changes were made per protocol according to the [facility]   Automatic Extended Infusion Dose Adjustment Policy.     *Please note this dose may need readjusted if patient's condition changes.    Please contact pharmacy with any questions regarding these changes.    Lisandra Garcia RPH  5/22/2025  7:10 AM

## 2025-05-22 NOTE — PROGRESS NOTES
Department of Internal Medicine  Nephrology Progress Note      Events reviewed     CHIEF COMPLAINT:  We are following for REYNOLD and hyperkalemia. He has no complaints today.     PHYSICAL EXAM:      Vitals:    VITALS:  BP (!) 141/64   Pulse 81   Temp 97.9 °F (36.6 °C) (Oral)   Resp 18   Ht 1.778 m (5' 10\")   Wt (!) 173.7 kg (383 lb)   SpO2 93%   BMI 54.95 kg/m²   24HR INTAKE/OUTPUT:    Intake/Output Summary (Last 24 hours) at 5/22/2025 1024  Last data filed at 5/22/2025 0015  Gross per 24 hour   Intake 550 ml   Output 1200 ml   Net -650 ml       Constitutional: Patient is awake, alert, no distress  HEENT: Pupils are equal reactive  Respiratory: Decreased breath sounds remains  Cardiovascular/Edema: Heart sounds are regular  Gastrointestinal: Abdomen soft, 1+ abdominal wall edema  Neurologic: Nonfocal  Skin: No skin rashes  Other: Trace edema, 1+ abdominal wall edema    Scheduled Meds:   cefepime  2,000 mg IntraVENous Q12H    [START ON 5/23/2025] pantoprazole  40 mg Oral Daily    pantoprazole  20 mg Oral Once    budesonide  0.5 mg Nebulization BID RT    arformoterol tartrate  15 mcg Nebulization BID RT    sodium chloride flush  5-40 mL IntraVENous 2 times per day    doxycycline (VIBRAMYCIN) IV  100 mg IntraVENous Q12H    aspirin  81 mg Oral BID    [Held by provider] furosemide  20 mg Oral Daily    [Held by provider] metoprolol tartrate  12.5 mg Oral BID    miconazole  1 Application Topical BID    tamsulosin  0.4 mg Oral Daily    ferrous sulfate  325 mg Oral Daily with breakfast    folic acid  1 mg Oral Daily    insulin lispro  0-4 Units SubCUTAneous 4x Daily AC & HS    ipratropium 0.5 mg-albuterol 2.5 mg  1 Dose Inhalation Q4H WA RT    methylPREDNISolone  40 mg IntraVENous Q12H    warfarin placeholder: dosing by pharmacy   Oral RX Placeholder    fludrocortisone  0.1 mg Oral Daily     Continuous Infusions:   sodium bicarbonate 150 mEq in dextrose 5 % 1,000 mL infusion 75 mL/hr at 05/22/25 1323    sodium chloride       dextrose       PRN Meds:.docusate sodium, lactulose, loperamide, sodium chloride flush, sodium chloride, ondansetron **OR** ondansetron, polyethylene glycol, acetaminophen **OR** acetaminophen, glucose, dextrose bolus **OR** dextrose bolus, glucagon (rDNA), dextrose, melatonin      DATA:    CBC:   Lab Results   Component Value Date/Time    WBC 6.1 05/22/2025 04:30 AM    RBC 5.26 05/22/2025 04:30 AM    HGB 10.6 05/22/2025 04:30 AM    HCT 37.6 05/22/2025 04:30 AM    MCV 71.5 05/22/2025 04:30 AM    MCH 20.2 05/22/2025 04:30 AM    MCHC 28.2 05/22/2025 04:30 AM    RDW 24.7 05/22/2025 04:30 AM     05/19/2025 06:25 AM    MPV Can not be calculated 05/22/2025 04:30 AM     CMP:    Lab Results   Component Value Date/Time     05/22/2025 04:30 AM    K 5.8 05/22/2025 04:30 AM     05/22/2025 04:30 AM    CO2 29 05/22/2025 04:30 AM    BUN 57 05/22/2025 04:30 AM    CREATININE 2.0 05/22/2025 04:30 AM    GFRAA > 60 05/19/2025 06:25 AM    LABGLOM 36 05/22/2025 04:30 AM    LABGLOM 55 09/06/2023 12:00 PM    GLUCOSE 241 05/22/2025 04:30 AM    GLUCOSE 133 05/19/2025 06:25 AM    CALCIUM 8.3 05/22/2025 04:30 AM    BILITOT 0.4 05/20/2025 11:57 PM    ALKPHOS 78 05/20/2025 11:57 PM    AST 17 05/20/2025 11:57 PM    ALT 8 05/20/2025 11:57 PM     Magnesium:    Lab Results   Component Value Date/Time    MG 2.2 05/20/2025 11:57 PM     Phosphorus:  No results found for: \"PHOS\"    Radiology Review:      CT chest without IV contrast 5/21/2025    IMPRESSION:  1. Interval development of some increased markings identified within the left lung base concerning for infiltrate such as possible aspiration.  2. Cholelithiasis.  3. Stable heterogeneous enlargement and nodularity throughout the left lobe of the thyroid in which ultrasound correlation on a nonemergent basis is  suggested.  4. Stable enlarged ascending thoracic aorta largest AP dimension measuring  4.6 cm.      BRIEF SUMMARY OF INITIAL CONSULT:    Brief Mr. Mcdonald is a

## 2025-05-22 NOTE — PROGRESS NOTES
Pulmonary Progress Note    Admit Date: 2025  Hospital day                               PCP: Geoff Kauffman DO    Chief Complaint (s):  Patient Active Problem List   Diagnosis    Morbid obesity with BMI of 60.0-69.9, adult (HCC)    SUKHWINDER (obstructive sleep apnea)    Diabetes mellitus type 2, uncontrolled    Coronary artery disease involving native coronary artery without angina pectoris    Hyperlipidemia LDL goal <100    Essential hypertension    Acute kidney injury    Acute diastolic heart failure (HCC)    Pulmonary embolism, bilateral (HCC)    Neuropathy of right sciatic nerve    Generalized weakness    Physical deconditioning    Displaced fracture of proximal end of humerus    Ankle fracture, left, open type III, initial encounter    Anemia    Drug-induced constipation    BRBPR (bright red blood per rectum)    Acute hypoxic respiratory failure (HCC)    Congestive heart failure (HCC)    Acute respiratory failure with hypoxia and hypercapnia (HCC)    Hyperkalemia    Rhinovirus infection    REYNOLD (acute kidney injury)       Subjective:  Patient seen on 2 L nasal cannula. He is eating breakfast barely sitting up. Patient has some increase in cough it is productive and wheezing. He had questions regarding glucose and kidney function.       Vitals:  VITALS:  /65   Pulse (!) 104   Temp 97.3 °F (36.3 °C) (Oral)   Resp 18   Ht 1.778 m (5' 10\")   Wt (!) 173.7 kg (383 lb)   SpO2 95%   BMI 54.95 kg/m²     24HR INTAKE/OUTPUT:      Intake/Output Summary (Last 24 hours) at 2025 1139  Last data filed at 2025 1039  Gross per 24 hour   Intake 550 ml   Output 1900 ml   Net -1350 ml       24HR PULSE OXIMETRY RANGE:    SpO2  Av.3 %  Min: 90 %  Max: 100 %    Medications:  IV:   sodium chloride      dextrose      bumetanide (BUMEX) 12.5 mg in sodium chloride 0.9 % 125 mL infusion 0.2 mg/hr (25 1930)       Scheduled Meds:   cefepime  2,000 mg IntraVENous Q12H    [START ON

## 2025-05-23 ENCOUNTER — APPOINTMENT (OUTPATIENT)
Dept: GENERAL RADIOLOGY | Age: 70
DRG: 193 | End: 2025-05-23
Payer: MEDICARE

## 2025-05-23 LAB
ANION GAP SERPL CALCULATED.3IONS-SCNC: 9 MMOL/L (ref 7–16)
BASOPHILS # BLD: 0.01 K/UL (ref 0–0.2)
BASOPHILS NFR BLD: 0 % (ref 0–2)
BNP SERPL-MCNC: 2280 PG/ML (ref 0–125)
BUN SERPL-MCNC: 62 MG/DL (ref 6–23)
C DIFF GDH + TOXINS A+B STL QL IA.RAPID: NEGATIVE
CALCIUM SERPL-MCNC: 8 MG/DL (ref 8.6–10.2)
CHLORIDE SERPL-SCNC: 98 MMOL/L (ref 98–107)
CO2 SERPL-SCNC: 30 MMOL/L (ref 22–29)
CREAT SERPL-MCNC: 1.9 MG/DL (ref 0.7–1.2)
EOSINOPHIL # BLD: 0 K/UL (ref 0.05–0.5)
EOSINOPHILS RELATIVE PERCENT: 0 % (ref 0–6)
ERYTHROCYTE [DISTWIDTH] IN BLOOD BY AUTOMATED COUNT: 24.5 % (ref 11.5–15)
GFR, ESTIMATED: 37 ML/MIN/1.73M2
GLUCOSE BLD-MCNC: 239 MG/DL (ref 74–99)
GLUCOSE BLD-MCNC: 301 MG/DL (ref 74–99)
GLUCOSE BLD-MCNC: 316 MG/DL (ref 74–99)
GLUCOSE BLD-MCNC: 331 MG/DL (ref 74–99)
GLUCOSE BLD-MCNC: 461 MG/DL (ref 74–99)
GLUCOSE SERPL-MCNC: 366 MG/DL (ref 74–99)
HCT VFR BLD AUTO: 36.7 % (ref 37–54)
HGB BLD-MCNC: 10.4 G/DL (ref 12.5–16.5)
IMM GRANULOCYTES # BLD AUTO: <0.03 K/UL (ref 0–0.58)
IMM GRANULOCYTES NFR BLD: 0 % (ref 0–5)
INR PPP: 5.6
LYMPHOCYTES NFR BLD: 0.58 K/UL (ref 1.5–4)
LYMPHOCYTES RELATIVE PERCENT: 9 % (ref 20–42)
MCH RBC QN AUTO: 20.4 PG (ref 26–35)
MCHC RBC AUTO-ENTMCNC: 28.3 G/DL (ref 32–34.5)
MCV RBC AUTO: 71.8 FL (ref 80–99.9)
MICROORGANISM SPEC CULT: NORMAL
MONOCYTES NFR BLD: 0.66 K/UL (ref 0.1–0.95)
MONOCYTES NFR BLD: 10 % (ref 2–12)
NEUTROPHILS NFR BLD: 81 % (ref 43–80)
NEUTS SEG NFR BLD: 5.46 K/UL (ref 1.8–7.3)
PLATELET, FLUORESCENCE: 375 K/UL (ref 130–450)
PMV BLD AUTO: ABNORMAL FL (ref 7–12)
POTASSIUM SERPL-SCNC: 4.5 MMOL/L (ref 3.5–5)
PROTHROMBIN TIME: 61 SEC (ref 9.3–12.4)
RBC # BLD AUTO: 5.11 M/UL (ref 3.8–5.8)
RBC # BLD: ABNORMAL 10*6/UL
SERVICE CMNT-IMP: NORMAL
SODIUM SERPL-SCNC: 137 MMOL/L (ref 132–146)
SPECIMEN DESCRIPTION: NORMAL
SPECIMEN DESCRIPTION: NORMAL
WBC OTHER # BLD: 6.7 K/UL (ref 4.5–11.5)

## 2025-05-23 PROCEDURE — 94660 CPAP INITIATION&MGMT: CPT

## 2025-05-23 PROCEDURE — 99233 SBSQ HOSP IP/OBS HIGH 50: CPT | Performed by: STUDENT IN AN ORGANIZED HEALTH CARE EDUCATION/TRAINING PROGRAM

## 2025-05-23 PROCEDURE — 82962 GLUCOSE BLOOD TEST: CPT

## 2025-05-23 PROCEDURE — 6370000000 HC RX 637 (ALT 250 FOR IP): Performed by: INTERNAL MEDICINE

## 2025-05-23 PROCEDURE — 6360000002 HC RX W HCPCS

## 2025-05-23 PROCEDURE — 2500000003 HC RX 250 WO HCPCS

## 2025-05-23 PROCEDURE — 6370000000 HC RX 637 (ALT 250 FOR IP)

## 2025-05-23 PROCEDURE — 6370000000 HC RX 637 (ALT 250 FOR IP): Performed by: NURSE PRACTITIONER

## 2025-05-23 PROCEDURE — 2580000003 HC RX 258: Performed by: STUDENT IN AN ORGANIZED HEALTH CARE EDUCATION/TRAINING PROGRAM

## 2025-05-23 PROCEDURE — 2060000000 HC ICU INTERMEDIATE R&B

## 2025-05-23 PROCEDURE — 85025 COMPLETE CBC W/AUTO DIFF WBC: CPT

## 2025-05-23 PROCEDURE — 85610 PROTHROMBIN TIME: CPT

## 2025-05-23 PROCEDURE — 2580000003 HC RX 258

## 2025-05-23 PROCEDURE — 83880 ASSAY OF NATRIURETIC PEPTIDE: CPT

## 2025-05-23 PROCEDURE — 80048 BASIC METABOLIC PNL TOTAL CA: CPT

## 2025-05-23 PROCEDURE — 94640 AIRWAY INHALATION TREATMENT: CPT

## 2025-05-23 PROCEDURE — 6370000000 HC RX 637 (ALT 250 FOR IP): Performed by: STUDENT IN AN ORGANIZED HEALTH CARE EDUCATION/TRAINING PROGRAM

## 2025-05-23 RX ORDER — BUMETANIDE 1 MG/1
2 TABLET ORAL DAILY
Status: DISCONTINUED | OUTPATIENT
Start: 2025-05-23 | End: 2025-05-27 | Stop reason: HOSPADM

## 2025-05-23 RX ORDER — INSULIN LISPRO 100 [IU]/ML
0-16 INJECTION, SOLUTION INTRAVENOUS; SUBCUTANEOUS
Status: DISCONTINUED | OUTPATIENT
Start: 2025-05-23 | End: 2025-05-27 | Stop reason: HOSPADM

## 2025-05-23 RX ORDER — INSULIN LISPRO 100 [IU]/ML
15 INJECTION, SOLUTION INTRAVENOUS; SUBCUTANEOUS ONCE
Status: COMPLETED | OUTPATIENT
Start: 2025-05-23 | End: 2025-05-23

## 2025-05-23 RX ORDER — SODIUM CHLORIDE 9 MG/ML
INJECTION, SOLUTION INTRAVENOUS CONTINUOUS
Status: DISCONTINUED | OUTPATIENT
Start: 2025-05-23 | End: 2025-05-23

## 2025-05-23 RX ORDER — FLUTICASONE PROPIONATE 50 MCG
2 SPRAY, SUSPENSION (ML) NASAL DAILY PRN
Status: DISCONTINUED | OUTPATIENT
Start: 2025-05-23 | End: 2025-05-27 | Stop reason: HOSPADM

## 2025-05-23 RX ORDER — INSULIN GLARGINE 100 [IU]/ML
20 INJECTION, SOLUTION SUBCUTANEOUS DAILY
Status: DISCONTINUED | OUTPATIENT
Start: 2025-05-23 | End: 2025-05-27 | Stop reason: HOSPADM

## 2025-05-23 RX ORDER — FUROSEMIDE 10 MG/ML
40 INJECTION INTRAMUSCULAR; INTRAVENOUS ONCE
Status: DISCONTINUED | OUTPATIENT
Start: 2025-05-23 | End: 2025-05-23

## 2025-05-23 RX ADMIN — ARFORMOTEROL TARTRATE 15 MCG: 15 SOLUTION RESPIRATORY (INHALATION) at 20:54

## 2025-05-23 RX ADMIN — INSULIN LISPRO 12 UNITS: 100 INJECTION, SOLUTION INTRAVENOUS; SUBCUTANEOUS at 16:42

## 2025-05-23 RX ADMIN — CEFEPIME 2000 MG: 2 INJECTION, POWDER, FOR SOLUTION INTRAVENOUS at 06:47

## 2025-05-23 RX ADMIN — METHYLPREDNISOLONE SODIUM SUCCINATE 40 MG: 40 INJECTION, POWDER, LYOPHILIZED, FOR SOLUTION INTRAMUSCULAR; INTRAVENOUS at 00:15

## 2025-05-23 RX ADMIN — ASPIRIN 81 MG: 81 TABLET, COATED ORAL at 16:42

## 2025-05-23 RX ADMIN — ARFORMOTEROL TARTRATE 15 MCG: 15 SOLUTION RESPIRATORY (INHALATION) at 09:34

## 2025-05-23 RX ADMIN — SODIUM CHLORIDE, PRESERVATIVE FREE 10 ML: 5 INJECTION INTRAVENOUS at 23:24

## 2025-05-23 RX ADMIN — METHYLPREDNISOLONE SODIUM SUCCINATE 40 MG: 40 INJECTION, POWDER, LYOPHILIZED, FOR SOLUTION INTRAMUSCULAR; INTRAVENOUS at 10:45

## 2025-05-23 RX ADMIN — BUDESONIDE 500 MCG: 0.5 SUSPENSION RESPIRATORY (INHALATION) at 09:34

## 2025-05-23 RX ADMIN — BUDESONIDE 500 MCG: 0.5 SUSPENSION RESPIRATORY (INHALATION) at 20:54

## 2025-05-23 RX ADMIN — PANTOPRAZOLE SODIUM 40 MG: 40 TABLET, DELAYED RELEASE ORAL at 08:02

## 2025-05-23 RX ADMIN — CEFEPIME 2000 MG: 2 INJECTION, POWDER, FOR SOLUTION INTRAVENOUS at 16:49

## 2025-05-23 RX ADMIN — INSULIN LISPRO 15 UNITS: 100 INJECTION, SOLUTION INTRAVENOUS; SUBCUTANEOUS at 01:05

## 2025-05-23 RX ADMIN — MICONAZOLE NITRATE 1 APPLICATION: 20 POWDER TOPICAL at 08:01

## 2025-05-23 RX ADMIN — ASPIRIN 81 MG: 81 TABLET, COATED ORAL at 08:01

## 2025-05-23 RX ADMIN — TAMSULOSIN HYDROCHLORIDE 0.4 MG: 0.4 CAPSULE ORAL at 08:01

## 2025-05-23 RX ADMIN — DOXYCYCLINE 100 MG: 100 INJECTION, POWDER, LYOPHILIZED, FOR SOLUTION INTRAVENOUS at 16:48

## 2025-05-23 RX ADMIN — SODIUM CHLORIDE: 0.9 INJECTION, SOLUTION INTRAVENOUS at 10:45

## 2025-05-23 RX ADMIN — IPRATROPIUM BROMIDE AND ALBUTEROL SULFATE 1 DOSE: .5; 2.5 SOLUTION RESPIRATORY (INHALATION) at 20:54

## 2025-05-23 RX ADMIN — INSULIN LISPRO 12 UNITS: 100 INJECTION, SOLUTION INTRAVENOUS; SUBCUTANEOUS at 06:50

## 2025-05-23 RX ADMIN — BUMETANIDE 2 MG: 1 TABLET ORAL at 16:42

## 2025-05-23 RX ADMIN — INSULIN LISPRO 4 UNITS: 100 INJECTION, SOLUTION INTRAVENOUS; SUBCUTANEOUS at 10:49

## 2025-05-23 RX ADMIN — METHYLPREDNISOLONE SODIUM SUCCINATE 40 MG: 40 INJECTION, POWDER, LYOPHILIZED, FOR SOLUTION INTRAMUSCULAR; INTRAVENOUS at 23:23

## 2025-05-23 RX ADMIN — MICONAZOLE NITRATE 1 APPLICATION: 20 POWDER TOPICAL at 23:24

## 2025-05-23 RX ADMIN — INSULIN LISPRO 12 UNITS: 100 INJECTION, SOLUTION INTRAVENOUS; SUBCUTANEOUS at 21:02

## 2025-05-23 RX ADMIN — SODIUM CHLORIDE, PRESERVATIVE FREE 10 ML: 5 INJECTION INTRAVENOUS at 08:01

## 2025-05-23 RX ADMIN — IPRATROPIUM BROMIDE AND ALBUTEROL SULFATE 1 DOSE: .5; 2.5 SOLUTION RESPIRATORY (INHALATION) at 12:21

## 2025-05-23 RX ADMIN — IPRATROPIUM BROMIDE AND ALBUTEROL SULFATE 1 DOSE: .5; 2.5 SOLUTION RESPIRATORY (INHALATION) at 09:34

## 2025-05-23 RX ADMIN — FOLIC ACID 1 MG: 1 TABLET ORAL at 08:01

## 2025-05-23 RX ADMIN — INSULIN GLARGINE 20 UNITS: 100 INJECTION, SOLUTION SUBCUTANEOUS at 08:00

## 2025-05-23 RX ADMIN — DOXYCYCLINE 100 MG: 100 INJECTION, POWDER, LYOPHILIZED, FOR SOLUTION INTRAVENOUS at 05:43

## 2025-05-23 RX ADMIN — FERROUS SULFATE TAB 325 MG (65 MG ELEMENTAL FE) 325 MG: 325 (65 FE) TAB at 08:01

## 2025-05-23 RX ADMIN — FLUDROCORTISONE ACETATE 0.1 MG: 0.1 TABLET ORAL at 08:01

## 2025-05-23 RX ADMIN — IPRATROPIUM BROMIDE AND ALBUTEROL SULFATE 1 DOSE: .5; 2.5 SOLUTION RESPIRATORY (INHALATION) at 16:05

## 2025-05-23 RX ADMIN — FLUTICASONE PROPIONATE 2 SPRAY: 50 SPRAY, METERED NASAL at 16:50

## 2025-05-23 NOTE — PLAN OF CARE
BS consistently elevated. On steroids and is having take out delivered. Increase SSC to high. Add long acting insulin daily.

## 2025-05-23 NOTE — PROGRESS NOTES
Pt agreeable to having santamaria catheter removed and attempting external catheter. RN notified.

## 2025-05-23 NOTE — FLOWSHEET NOTE
Inpatient Wound Care (initial consult) 447    Admit Date: 5/20/2025 10:38 PM    Reason for consult:  left ankle     Patient laying down in bed, awake, alert and oriented. Patient declines rolling to assess back side. Patient states \"my buttocks are purple and justin.\"     Significant history:  per H&P    CHIEF COMPLAINT:  SOB, fatigue     History of Present Illness:  69 y.o. male with a history of HTN, HLD, DM, CAD, PE on coumadin, CHF presents with SOB, fatigue.  Patient resides at [facility] for rehab following trimalleolar frx of L ankle/foot.  Per staff, SpO2 in 60s yesterday afternoon.  Family reports that patient is more lethargic.  Cough also reported with no noted sputum production.  Denies any chest pain.  Denies any nausea/vomiting.  Patient does have history of bilateral PE for which she is on Coumadin.    Past Medical History:   Diagnosis Date    Arthritis     Hips, possible shoulders and knees    CAD (coronary artery disease)     Cellulitis 11/2016    right leg    Diabetes mellitus (HCC)     Hyperlipidemia     Hypertension     Kidney failure     Morbid obesity with body mass index (BMI) of 60.0 to 69.9 in adult (HCC)     Pulmonary embolism (HCC)     Sleep apnea      Findings:     05/23/25 1108   Wound 10/16/24 Ankle Left;Medial   Date First Assessed: 10/16/24   Present on Original Admission: (c) Yes  Location: Ankle  Wound Location Orientation: Left;Medial   Wound Image    Wound Etiology Surgical   Dressing Status New dressing applied   Wound Cleansed Cleansed with saline   Dressing/Treatment ABD;Roll gauze  (adaptic, opticell)   Wound Length (cm) 2 cm   Wound Width (cm) 2 cm   Wound Depth (cm) 0.1 cm   Wound Surface Area (cm^2) 4 cm^2   Change in Wound Size % (l*w) 55.56   Wound Volume (cm^3) 0.4 cm^3   Wound Assessment Pink/red   Drainage Amount Scant (moist but unmeasurable)   Drainage Description Serosanguinous   Odor None   Sameera-wound Assessment Blanchable erythema;Other (Comment)  (healing scar)

## 2025-05-23 NOTE — PROGRESS NOTES
Patient refusing santamaria securement device. This RN offered to reposition device to a different site to address the discomfort however patient declined.

## 2025-05-23 NOTE — PROGRESS NOTES
[facility] Hospitalist Progress Note    Admitting Date and Time: 5/20/2025 10:38 PM  Admit Dx: Hyperkalemia [E87.5]  Urinary retention [R33.9]  REYNOLD (acute kidney injury) [N17.9]  Acute respiratory failure with hypoxia and hypercapnia (HCC) [J96.01, J96.02]  Wound of foot [S91.309A]    Subjective:  Patient is being followed for Hyperkalemia [E87.5]  Urinary retention [R33.9]  REYNOLD (acute kidney injury) [N17.9]  Acute respiratory failure with hypoxia and hypercapnia (HCC) [J96.01, J96.02]  Wound of foot [S91.309A]   Pt was seen and examined today.  Denies any new issues.    ROS: denies fever, chills, cp, sob, n/v, HA unless stated above.     insulin lispro  0-16 Units SubCUTAneous 4x Daily AC & HS    insulin glargine  20 Units SubCUTAneous Daily    polycarbophil  625 mg Oral Daily    cefepime  2,000 mg IntraVENous Q12H    pantoprazole  40 mg Oral Daily    pantoprazole  20 mg Oral Once    budesonide  0.5 mg Nebulization BID RT    arformoterol tartrate  15 mcg Nebulization BID RT    sodium chloride flush  5-40 mL IntraVENous 2 times per day    doxycycline (VIBRAMYCIN) IV  100 mg IntraVENous Q12H    aspirin  81 mg Oral BID    [Held by provider] furosemide  20 mg Oral Daily    [Held by provider] metoprolol tartrate  12.5 mg Oral BID    miconazole  1 Application Topical BID    tamsulosin  0.4 mg Oral Daily    ferrous sulfate  325 mg Oral Daily with breakfast    folic acid  1 mg Oral Daily    ipratropium 0.5 mg-albuterol 2.5 mg  1 Dose Inhalation Q4H WA RT    methylPREDNISolone  40 mg IntraVENous Q12H    warfarin placeholder: dosing by pharmacy   Oral RX Placeholder     docusate sodium, 100 mg, Daily PRN  lactulose, 20 g, Daily PRN  loperamide, 2 mg, 4x Daily PRN  sodium chloride flush, 5-40 mL, PRN  sodium chloride, , PRN  ondansetron, 4 mg, Q8H PRN   Or  ondansetron, 4 mg, Q6H PRN  polyethylene glycol, 17 g, Daily PRN  acetaminophen, 650 mg, Q6H PRN   Or  acetaminophen, 650 mg, Q6H PRN  glucose, 4 tablet,  PRN  dextrose bolus, 125 mL, PRN   Or  dextrose bolus, 250 mL, PRN  glucagon (rDNA), 1 mg, PRN  dextrose, , Continuous PRN  melatonin, 9 mg, Nightly PRN         Objective:    /86   Pulse 91   Temp 98.4 °F (36.9 °C) (Oral)   Resp 20   Ht 1.778 m (5' 10\")   Wt (!) 174.9 kg (385 lb 8 oz)   SpO2 92%   BMI 55.31 kg/m²     General Appearance: alert and oriented to person, place and time and in no acute distress  Skin: warm and dry  Head: normocephalic and atraumatic  Pulmonary/Chest: clear to auscultation bilaterally- no wheezes, rales or rhonchi, normal air movement, no respiratory distress  Cardiovascular: normal rate, normal S1 and S2  Abdomen: soft, non-tender, non-distended, hyperactive bowel sounds  Extremities: no cyanosis, no clubbing and no edema      Recent Labs     05/22/25  1105 05/22/25  1714 05/23/25  0545    137 137   K 5.4* 5.3* 4.5    99 98   CO2 28 28 30*   BUN 58* 62* 62*   CREATININE 1.9* 1.9* 1.9*   GLUCOSE 362* 447* 366*   CALCIUM 8.1* 8.2* 8.0*       Recent Labs     05/20/25  2357 05/22/25  0430 05/23/25  0545   WBC 5.9 6.1 6.7   RBC 6.04* 5.26 5.11   HGB 11.9* 10.6* 10.4*   HCT 45.1 37.6 36.7*   MCV 74.7* 71.5* 71.8*   MCH 19.7* 20.2* 20.4*   MCHC 26.4* 28.2* 28.3*   RDW 24.9* 24.7* 24.5*   MPV Can not be calculated Can not be calculated Can not be calculated       Radiology:   CT HEAD WO CONTRAST   Final Result   No acute intracranial abnormality.         CT CHEST WO CONTRAST   Final Result   1. Interval development of some increased markings identified within the left   lung base concerning for infiltrate such as possible aspiration.   2. Cholelithiasis.   3. Stable heterogeneous enlargement and nodularity throughout the left lobe   of the thyroid in which ultrasound correlation on a nonemergent basis is   suggested.   4. Stable enlarged ascending thoracic aorta largest AP dimension measuring   4.6 cm.         XR CHEST PORTABLE   Final Result   Mild pulmonary edema

## 2025-05-23 NOTE — CONSULTS
Comprehensive Nutrition Assessment    Type and Reason for Visit:  Initial, Wound, Consult    Nutrition Recommendations/Plan:     Recommend add 5 carb/meal CHO controlled diet restriction to current diet d/t hyperglycemia in the setting of DM on steroid tx.    Continue Wound healing ONS BID and HP Gelatin QD, as tolerated    Will continue inpatient monitoring POC     Malnutrition Assessment:  Malnutrition Status:  Insufficient data (05/23/25 1510)    Context:  Chronic Illness     Findings of the 6 clinical characteristics of malnutrition:  Energy Intake:  No decrease in energy intake  Weight Loss:  No weight loss     Body Fat Loss:  Unable to assess (Droplet Isolation (Rhinovirus))     Muscle Mass Loss:  Unable to assess (Droplet Isolation)    Fluid Accumulation:  Unable to assess Generalized (+2 edema with multiple causes)   Strength:  Not Performed    Nutrition Assessment:    Pt admit from [facility] 2/2 REYNOLD on CKD-hyperkalemia (6.8 on 5/20) and Rhinovirus +. PMHx: CHF, CKD 3, DM, CAD w/CABG, left ankle surgery 10/2024, obesity. Will add Wound Healing ONS BID and HP Gelatin QD to optimize nutrient intake to promote improved skin integrity/wound healing.    Nutrition Related Findings:    soft obese abd with pannus and hyperactive BS, diarrhea, A&Ox4, gen +2 edema, I/O WNL, BUN/creat 62/1.9, K+4.5, hyperglycemia Wound Type: Unstageable, Surgical Incision, Pressure Injury       Current Nutrition Intake & Therapies:    Average Meal Intake: 51-75%, %  Average Supplements Intake: None Ordered  ADULT DIET; Regular; Low Potassium (Less than 3000 mg/day)  ADULT ORAL NUTRITION SUPPLEMENT; Breakfast, Dinner; Wound Healing Oral Supplement  ADULT ORAL NUTRITION SUPPLEMENT; Lunch; Fortified Gelatin Oral Supplement    Anthropometric Measures:  Height: 177.8 cm (5' 10\")  Ideal Body Weight (IBW): 166 lbs (75 kg)    Admission Body Weight: 173.7 kg (382 lb 15 oz) (5/22 bedscale)  Current Body Weight: 174.9 kg (385 lb 9.4 oz),  232.3 % IBW. Weight Source: Bed scale (5/23)  Current BMI (kg/m2): 55.3  Usual Body Weight: 165.7 kg (365 lb 4.8 oz) (2/2/2025)     % Weight Change (Calculated): 5.6                    BMI Categories: Obese Class 3 (BMI 40.0 or greater)    Estimated Daily Nutrient Needs:  Energy Requirements Based On: Kcal/kg  Weight Used for Energy Requirements: Admission  Energy (kcal/day):  (12-15 kcal/kg)  Weight Used for Protein Requirements: Ideal  Protein (g/day): 100-115 (1.3-1.5 g/kg as kim w/CKD)  Method Used for Fluid Requirements: Defer to provider  Fluid (ml/day): Nephro following    Nutrition Diagnosis:   Increased nutrient needs related to other (nutrient demand to promote wound healing and increased demand d/t respiratory status) as evidenced by wounds, lab values    Nutrition Interventions:   Food and/or Nutrient Delivery: Continue Current Diet, Start Oral Nutrition Supplement  Nutrition Education/Counseling: No recommendation at this time  Coordination of Nutrition Care: Continue to monitor while inpatient       Goals:  Goals: PO intake 75% or greater, by next RD assessment  Type of Goal: New goal       Nutrition Monitoring and Evaluation:   Behavioral-Environmental Outcomes: None Identified  Food/Nutrient Intake Outcomes: Food and Nutrient Intake, Supplement Intake  Physical Signs/Symptoms Outcomes: Biochemical Data, GI Status, Fluid Status or Edema, Nutrition Focused Physical Findings, Skin, Weight    Discharge Planning:    Continue Oral Nutrition Supplement     Zara Jimenez, RD, CNSC, LD  Contact: x [phone]

## 2025-05-23 NOTE — PROGRESS NOTES
Notified TIFFANIE Luis of new swollen area in right upper arm with redness and warmth. Patient states had a peripheral IV that infiltrated in that arm.

## 2025-05-23 NOTE — PROGRESS NOTES
Discussed with patient regarding hospital protocol for santamaria removal. Patient adamantly refused to have santamaria removed at this time stating \"Let's just leave things the way they are for now since it's easier.\" MD and charge RN made aware of patient's refusal.

## 2025-05-23 NOTE — PROGRESS NOTES
Pharmacy Consultation Note  (Warfarin Dosing and Monitoring)    Initial consult date: 5/21/2025  Consulting Provider: Brinda López, JAMIE-JING    Daniel Mcdonald is a 69 y.o. male for whom pharmacy has been asked to manage warfarin therapy.     Weight:   Wt Readings from Last 1 Encounters:   05/23/25 (!) 174.9 kg (385 lb 8 oz)       TSH:    Lab Results   Component Value Date/Time    TSH 1.19 05/20/2025 11:57 PM       Hepatic Function Panel:                            Lab Results   Component Value Date/Time    ALKPHOS 78 05/20/2025 11:57 PM    ALT 8 05/20/2025 11:57 PM    AST 17 05/20/2025 11:57 PM    BILITOT 0.4 05/20/2025 11:57 PM    BILIDIR 0.1 04/03/2025 05:20 AM       Current significant warfarin drug-drug interactions include: steroids (variable/dose dependent)    Recent Labs     05/20/25  2357 05/22/25  0430 05/23/25  0545   HGB 11.9* 10.6* 10.4*     Date Warfarin Dose INR Heparin or LMWH Comment   5/21 1 mg 3.2  (5/20) --    5/22 HOLD 5.6 --    5/23 HOLD 5.6 --                    Assessment:  Patient is a 69 y.o. male on warfarin for History of  VTE/PE.  Patient's home warfarin dosing regimen is documented as warfarin 3 mg daily per SNF documentation.   Goal INR 2 - 3  INR 5.6 today    Plan:  Hold warfarin again tonight  Daily PT/INR until the INR is stable within the therapeutic range  Pharmacist will follow and monitor/adjust dosing as necessary    Sharath Lopez, PharmD, BCCCP  5/23/2025  11:09 AM    SEB: [phone]  SEY: [phone]  SJW: [phone]

## 2025-05-23 NOTE — ACP (ADVANCE CARE PLANNING)
Advance Care Planning   Healthcare Decision Maker:    Primary Decision Maker: Michaelle Mcdonald - [facility] - [phone]

## 2025-05-23 NOTE — DISCHARGE INSTR - COC
Continuity of Care Form    Patient Name: Daniel Mcdonald   :  1955  MRN:  [id number]    Admit date:  2025  Discharge date:  25    Code Status Order: Full Code   Advance Directives:     Admitting Physician:  Jose Lam DO  PCP: Geoff Kauffman DO    Discharging Nurse: AL  Discharging Hospital Unit/Room#: 0447/0447-A  Discharging Unit Phone Number: [phone]    Emergency Contact:   Extended Emergency Contact Information  Primary Emergency Contact: Michaelle Mcdonald  Address: [address] [place] [address] [facility]  Home Phone: [phone]  Mobile Phone: [phone]  Relation: Spouse   needed? No  Secondary Emergency Contact: [place] [address] [facility]  Home Phone: [phone]  Mobile Phone: [phone]  Relation: Brother/Sister   needed? No    Past Surgical History:  Past Surgical History:   Procedure Laterality Date    ANKLE SURGERY Left 10/16/2024    Left Ankle Irrigation and Debridement with Placement of Left Ankle Spanning External Fixator performed by Jose Morrissey DO at [facility]    CARDIAC SURGERY  2019    ACB x 5 at [facility]    COLONOSCOPY  2006    ENDOSCOPY, COLON, DIAGNOSTIC      THROAT SURGERY      TONSILLECTOMY      WISDOM TOOTH EXTRACTION         Immunization History:   There is no immunization history for the selected administration types on file for this patient.    Active Problems:  Patient Active Problem List   Diagnosis Code    Morbid obesity with BMI of 60.0-69.9, adult (HCC) E66.01, Z68.44    SUKHWINDER (obstructive sleep apnea) G47.33    Diabetes mellitus type 2, uncontrolled [id number]    Coronary artery disease involving native coronary artery without angina pectoris I25.10    Hyperlipidemia LDL goal <100 E78.5    Essential hypertension I10    Acute kidney injury N17.9    Acute diastolic heart failure (HCC) I50.31    Pulmonary embolism, bilateral ([facility])  IMPRESSION and RECOMMENDATIONS:  These findings were discussed with the patient.    -- Ocular hypertension both eyes - normal appearing optic nerves, normal to slightly elevated IOP both eyes without medication. Corneal pachymetry has previously shown slightly thicker than average at 594 µm right, 588 µm left (555-588) which may overestimate true intraocular pressure reducing her risk of glaucoma.   OCT optic nerve Today 2/2024 again shows no retinal nerve fiber layer defects to indicate glaucoma damage. Normal ganglion cell layer both eyes. No changes to correlate with subtle findings seen on previous visual field.  Friedman visual field testing 10/2022 showed a few scattered points of suppression in the inferior visual field for both eyes and superior suppression greater on the left which appeared to resemble an arcuate defect.  -- Reviewed the diagnostic criteria for glaucoma with the goal of correctly identifying those patients at risk for or with vision loss and treating them. Existing areas of damage unfortunately cannot be regained. The risk of permanent visual loss up to and including blindness was discussed. Treatment to reduce the intraocular pressure with an initial goal of pressure reduction by 25-30% has been demonstrated to reduce the risk of progression of damage to the optic nerve.   -- Reviewed the chronic nature of glaucoma and the goal of therapy to preserve current visual function and prevent further loss. Management strategies include topical eyedrops, laser (selective laser trabeculoplasty or G6 cyclo-photocoagulation), incisional surgeries including minimally invasive devices such as iStent or Xen; trabeculectomy with or without Express shunt or a tube shunt (Ahmed or Baerveldt).  -- OCT optic nerve PERFORMED to assess for glaucoma damage and progression  -- Remain off Lumigan at bedtime; if pressures increase will reconsider treating.    -- Diabetes mellitus type 2 - No evidence of diabetic  retinopathy on dilated fundoscopic examination today.  In particular, no microaneurysms, no edema, no exudate, no hemorrhages, no neovascularization in either eye.   -- The nature of diabetes and the development of diabetic retinopathy was discussed with the patient; all questions were answered.  The role of blood sugar, blood pressure and lipids in the development and progression of diabetic retinopathy was also discussed.  The importance of an annual exam to monitor for retinopathy was emphasized to the patient.  The patient was encouraged to work on good glycemic control to reduce the risk of developing retinopathy and potential visual loss.     -- Nuclear sclerotic cataract both eyes - some progression with best corrected acuity 20/40 each eye. Acuity improve slightly during glare testing.   -- Reviewed the nature cataracts and their effects on vision over time with progression. The timing and indications for cataract surgery were discussed.   -- Observation is reasonable.    -- Myopic astigmatism with presbyopia both eyes - Minimal change from current.   -- Continue with current correction, NO refraction performed    -- Reviewed the habits of good ocular health and safety including the use of sunglasses for ultraviolet protection and safety eyewear when appropriate.    RTC 1 year complete eye exam, glare test - diabetes, ocular hypertension, cataract, sooner as needed any problems.    Jeanne Lemus MD      CHIEF COMPLAINT:   Chief Complaint   Patient presents with    Office Visit    Diabetic Eye Exam       HISTORY OF PRESENT ILLNESS:   2/13/2024 - Established patient is a 60 year-old female who returns for a diabetic eye exam, glare testing, and OCT optic nerve to monitor ocular hypertension and cataracts. She thinks she may need new glasses. She is having difficulty with near and distance vision. She also notes difficulty with glare when driving at night.    No interval change in health.         Patient  is a 60 year old  female with diabetes mellitus for 7 years (9/2017). Most recent hemoglobin A1c   Hemoglobin A1C (%)   Date Value   05/22/2023 6.2 (H)   . Last blood sugar 81 fasting, 2/12/2024.    10/14/2022 - Established patient returns for [facility] C24-2 and post-cycloplegic refraction. Her near and distance vision is blurry with her glasses. She tried using artificial tears as directed in her last phone call, but she states they did not help with blurriness.    No interval changes in health.       11/05/2021 - Established patient is a 57-year-old female who returns for a diabetic eye exam. Her distance vision is blurry with her glasses.    No interval changes in health.             Alert and oriented x3          M&A normal  Last:   CEE  11/5/2021         Glasses   1.5 years old   OCT   7/14/2020   [facility]  10/14/2022       EYE REVIEW OF SYSTEMS:  DRY   Yes - OTC artificial tears as needed     IRRITATION  No     ITCH   Yes- uses allergy eye drops    PAIN   No     RED   No     PHOTOPHOBIA Yes- glare is bothersome when driving at night     FLOATERS  No                  PHOTOPSIA            No    NEURO REVIEW OF SYSTEMS:   DIPLOPIA  No  DIZZINESS  No  HEADACHE  No  NUMBNESS  No  TINGLING  No  SEIZURES  No    REVIEW OF SYSTEMS:  Positive - None; Negative - systemic, ENT, cardiovascular, respiratory, gastrointestinal, genitourinary, musculoskeletal, dermatologic, hematologic, psychiatric      EXAMINATION:  See Ophthalmologic Exam section      OPTICAL COHERENCE TOMOGRAPHY: OPTIC NERVE - Spectral Domain  2/13/2024  RIGHT EYE:  Signal strength  9/10  Retinal nerve fiber layer thickness: 100 µm, Normal tracing,   Optic nerve head analysis: Rim area 1.57 mm², disc area 1.83 mm².  Average cup-to-disc ratio 0.37, vertical ratio 0.29, cup volume 0.028 mm³.  LEFT EYE:   Signal strength  8/10  Retinal nerve fiber layer thickness: 98 µm,  normal tracing,   Optic nerve head analysis:  Rim area 1.63 mm², disc area 1.87 mm².   Average cup-to-disc ratio 0.35, vertical ratio 0.38, cup volume 0.024 mm³.  Impression:   normal retinal nerve fiber layer thickness both eyes with no focal areas of thinning and healthy retinal nerve fiber layer thickness both eyes. Stable in comparison to 2020    Jeanne Lemus MD       OPTICAL COHERENCE TOMOGRAPHY:  MACULA - Spectral Domain  2/13/2024  RIGHT EYE:  Signal strength  9/10, Normal ganglion cell layer  LEFT EYE:  Signal strength   8/10, Normal ganglion cell layer  Impression:  Normal ganglion cell layer both eyes with no areas of focal thinning to indicate glaucoma damage.    Jeanne Lemus MD       HOLLIDAY VISUAL FIELD TEST:  C24-2 APRYL - faster  10/14/2022  RIGHT EYE: 0/0 fixation loss, 0% false-positive, 10% false-negative, -2.84 dB mean deviation, 3.79 dB pattern standard deviation, visual field index 95% - reliable field, subtle suppression superior suggestive of an arcuate, scattered points of suppression inferior  LEFT EYE:  0/0 fixation loss, 0% false-positive, 21% false-negative, -5.17 dB mean deviation, 4.69 dB pattern standard deviation, visual field index 90% - reliable field, superior suppression consistent with an arcuate with a few scattered points of suppression inferior  Impression:  Both eyes show superior suppression more pronounced on the left. Superior suppression was present in 2017, absent in 2019 and is again found today. Scattered points of suppression inferiorly for both eyes have not been consistent. Continued observation with serial testing and correlation with OCT is recommended.    Jeanne Lemus MD       PAST OCULAR HISTORY:  Glaucoma diagnosed about 1 year ago - Dr ALEXEI Brandt.    FAMILY OCULAR HISTORY:  Glaucoma and macular degeneration- mother    PAST MEDICAL HISTORY:  Past Medical History:   Diagnosis Date    Anxiety     Arthritis     Depression     Diabetes mellitus, type 2 (CMD)     GERD     Glaucoma (increased eye pressure)     Malignant neoplasm  (CMD)     Other and unspecified hyperlipidemia     Pelvic pain in female     Rectocele        PAST SURGICAL HISTORY:  Past Surgical History:   Procedure Laterality Date    Appendectomy      Gynecologic cryosurgery      Hysterectomy  12/19/2017    with Left salpingoopherectomy, rectocele repair    Laparoscopy, cholecystectomy  01/01/1987    Cholecystectomy, laparoscopic    Laparoscopy,tubal cautery      Tubal Ligation    Oophorectomy      left ovary removed    Removal gallbladder         FAMILY MEDICAL HISTORY:  Family History   Problem Relation Age of Onset    Glaucoma Mother     Macular degeneration Mother        SOCIAL HISTORY:  Social History     Socioeconomic History    Marital status: /Civil Union     Spouse name: Not on file    Number of children: Not on file    Years of education: Not on file    Highest education level: Not on file   Occupational History    Not on file   Tobacco Use    Smoking status: Every Day     Current packs/day: 1.00     Average packs/day: 1 pack/day for 32.5 years (32.5 ttl pk-yrs)     Types: Cigarettes     Start date: 9/13/1991    Smokeless tobacco: Never    Tobacco comments:     Currently: 10 per day   Substance and Sexual Activity    Alcohol use: Not Currently     Comment: occasional    Drug use: No    Sexual activity: Not on file   Other Topics Concern    Not on file   Social History Narrative    Not on file     Social Determinants of Health     Financial Resource Strain: Not on file   Food Insecurity: Not on file   Transportation Needs: Not on file   Physical Activity: Not on file   Stress: Not on file   Social Connections: Not on file   Interpersonal Safety: Not At Risk (3/28/2021)    Interpersonal Safety     Social Determinants: Intimate Partner Violence Past Fear: Not on file     Social Determinants: Intimate Partner Violence Current Fear: Not on file       ALLERGIES:  No Known Allergies      Jeanne Lemus MD

## 2025-05-23 NOTE — PLAN OF CARE
Problem: Chronic Conditions and Co-morbidities  Goal: Patient's chronic conditions and co-morbidity symptoms are monitored and maintained or improved  5/23/2025 0959 by Lizette Greco RN  Outcome: Progressing     Problem: Discharge Planning  Goal: Discharge to home or other facility with appropriate resources  5/23/2025 0959 by Lizette Greco RN  Outcome: Progressing     Problem: Safety - Adult  Goal: Free from fall injury  5/23/2025 0959 by Lizette Greco RN  Outcome: Progressing

## 2025-05-23 NOTE — CARE COORDINATION
CARE MANAGEMENT..... Met with patient at the bedside. He confirmed that he is from [facility] and will return at discharge. Verified with Arely from the facility that Alistair is accepted back Longterm ([facility]). He is Medicaid pending and no precert required. Also confirmed that his baseline is room air and he wears bipap at HS with 2lnc bleed in. He is currently requiring o2 3lnc-nursing to wean as tolerated. Pulm following. On aerosols, ivf, and iv maxipime. Renal also on board for naina on ckd/hyperkalemia. PT 6/OT 11. N 17 in epic. Ambulance forms in chart. Will follow.

## 2025-05-23 NOTE — PROGRESS NOTES
White catheter removed at 1830 per protocol by this RN. Patient tolerated well without complication. External catheter placed at this time. Patient due to void [id number].

## 2025-05-23 NOTE — PROGRESS NOTES
Department of Internal Medicine  Nephrology Progress Note      Events reviewed     CHIEF COMPLAINT:  We are following Mr. Mcdonald for REYNOLD and hyperkalemia. He has no complaints today.     PHYSICAL EXAM:      Vitals:    VITALS:  /86   Pulse 91   Temp 98.4 °F (36.9 °C) (Oral)   Resp 20   Ht 1.778 m (5' 10\")   Wt (!) 174.9 kg (385 lb 8 oz)   SpO2 92%   BMI 55.31 kg/m²   24HR INTAKE/OUTPUT:  No intake or output data in the 24 hours ending 05/23/25 1318      Constitutional: Patient is awake, alert, no distress  HEENT: Pupils are equal reactive  Respiratory: Decreased breath sounds remains  Cardiovascular/Edema: Heart sounds are regular  Gastrointestinal: Abdomen soft, 1+ abdominal wall edema  Neurologic: Nonfocal  Skin: No skin rashes  Other: Trace edema, 1+ abdominal wall edema    Scheduled Meds:   insulin lispro  0-16 Units SubCUTAneous 4x Daily AC & HS    insulin glargine  20 Units SubCUTAneous Daily    polycarbophil  625 mg Oral Daily    cefepime  2,000 mg IntraVENous Q12H    pantoprazole  40 mg Oral Daily    pantoprazole  20 mg Oral Once    budesonide  0.5 mg Nebulization BID RT    arformoterol tartrate  15 mcg Nebulization BID RT    sodium chloride flush  5-40 mL IntraVENous 2 times per day    doxycycline (VIBRAMYCIN) IV  100 mg IntraVENous Q12H    aspirin  81 mg Oral BID    [Held by provider] furosemide  20 mg Oral Daily    [Held by provider] metoprolol tartrate  12.5 mg Oral BID    miconazole  1 Application Topical BID    tamsulosin  0.4 mg Oral Daily    ferrous sulfate  325 mg Oral Daily with breakfast    folic acid  1 mg Oral Daily    ipratropium 0.5 mg-albuterol 2.5 mg  1 Dose Inhalation Q4H WA RT    methylPREDNISolone  40 mg IntraVENous Q12H    warfarin placeholder: dosing by pharmacy   Oral RX Placeholder     Continuous Infusions:   sodium chloride 75 mL/hr at 05/23/25 1045    sodium chloride      dextrose       PRN Meds:.fluticasone, docusate sodium, lactulose, loperamide, sodium chloride  Lab Results   Component Value Date     (H) 07/01/2024     (H) 06/30/2024     (H) 06/29/2024     Lab Results   Component Value Date     (H) 07/01/2024     (H) 06/30/2024     (H) 06/29/2024     CT chest abdomen pelvis from admission with unchanged hepatomegaly  Has been increasing over the last few days  Tylenol level fine  Acute hepatitis panel  Stop the ertapenem as has completed course of 3 days  Abdominal ultrasound  Recheck ammonia and valproic acid level  Ammonia normal, valproic acid total low, free pending

## 2025-05-24 LAB
ANION GAP SERPL CALCULATED.3IONS-SCNC: 9 MMOL/L (ref 7–16)
BASOPHILS # BLD: 0 K/UL (ref 0–0.2)
BASOPHILS NFR BLD: 0 % (ref 0–2)
BUN SERPL-MCNC: 61 MG/DL (ref 6–23)
CALCIUM SERPL-MCNC: 8 MG/DL (ref 8.6–10.2)
CHLORIDE SERPL-SCNC: 100 MMOL/L (ref 98–107)
CO2 SERPL-SCNC: 30 MMOL/L (ref 22–29)
CREAT SERPL-MCNC: 1.9 MG/DL (ref 0.7–1.2)
EOSINOPHIL # BLD: 0 K/UL (ref 0.05–0.5)
EOSINOPHILS RELATIVE PERCENT: 0 % (ref 0–6)
ERYTHROCYTE [DISTWIDTH] IN BLOOD BY AUTOMATED COUNT: 24 % (ref 11.5–15)
GFR, ESTIMATED: 38 ML/MIN/1.73M2
GLUCOSE BLD-MCNC: 230 MG/DL (ref 74–99)
GLUCOSE BLD-MCNC: 266 MG/DL (ref 74–99)
GLUCOSE BLD-MCNC: 269 MG/DL (ref 74–99)
GLUCOSE BLD-MCNC: 276 MG/DL (ref 74–99)
GLUCOSE BLD-MCNC: 329 MG/DL (ref 74–99)
GLUCOSE SERPL-MCNC: 259 MG/DL (ref 74–99)
HCT VFR BLD AUTO: 35.8 % (ref 37–54)
HGB BLD-MCNC: 9.9 G/DL (ref 12.5–16.5)
INR PPP: 4.4
LYMPHOCYTES NFR BLD: 0.17 K/UL (ref 1.5–4)
LYMPHOCYTES RELATIVE PERCENT: 3 % (ref 20–42)
MCH RBC QN AUTO: 19.9 PG (ref 26–35)
MCHC RBC AUTO-ENTMCNC: 27.7 G/DL (ref 32–34.5)
MCV RBC AUTO: 72 FL (ref 80–99.9)
MONOCYTES NFR BLD: 0 % (ref 2–12)
MONOCYTES NFR BLD: 0 K/UL (ref 0.1–0.95)
NEUTROPHILS NFR BLD: 97 % (ref 43–80)
NEUTS SEG NFR BLD: 6.13 K/UL (ref 1.8–7.3)
PLATELET, FLUORESCENCE: 362 K/UL (ref 130–450)
PMV BLD AUTO: ABNORMAL FL (ref 7–12)
POTASSIUM SERPL-SCNC: 4.9 MMOL/L (ref 3.5–5)
PROTHROMBIN TIME: 48.4 SEC (ref 9.3–12.4)
RBC # BLD AUTO: 4.97 M/UL (ref 3.8–5.8)
RBC # BLD: ABNORMAL 10*6/UL
SODIUM SERPL-SCNC: 139 MMOL/L (ref 132–146)
WBC OTHER # BLD: 6.3 K/UL (ref 4.5–11.5)

## 2025-05-24 PROCEDURE — 6370000000 HC RX 637 (ALT 250 FOR IP)

## 2025-05-24 PROCEDURE — 2500000003 HC RX 250 WO HCPCS

## 2025-05-24 PROCEDURE — 82962 GLUCOSE BLOOD TEST: CPT

## 2025-05-24 PROCEDURE — 80048 BASIC METABOLIC PNL TOTAL CA: CPT

## 2025-05-24 PROCEDURE — 99232 SBSQ HOSP IP/OBS MODERATE 35: CPT | Performed by: STUDENT IN AN ORGANIZED HEALTH CARE EDUCATION/TRAINING PROGRAM

## 2025-05-24 PROCEDURE — 6360000002 HC RX W HCPCS

## 2025-05-24 PROCEDURE — 2580000003 HC RX 258

## 2025-05-24 PROCEDURE — 2060000000 HC ICU INTERMEDIATE R&B

## 2025-05-24 PROCEDURE — 6370000000 HC RX 637 (ALT 250 FOR IP): Performed by: STUDENT IN AN ORGANIZED HEALTH CARE EDUCATION/TRAINING PROGRAM

## 2025-05-24 PROCEDURE — 6370000000 HC RX 637 (ALT 250 FOR IP): Performed by: NURSE PRACTITIONER

## 2025-05-24 PROCEDURE — 2700000000 HC OXYGEN THERAPY PER DAY

## 2025-05-24 PROCEDURE — 6360000002 HC RX W HCPCS: Performed by: STUDENT IN AN ORGANIZED HEALTH CARE EDUCATION/TRAINING PROGRAM

## 2025-05-24 PROCEDURE — 85025 COMPLETE CBC W/AUTO DIFF WBC: CPT

## 2025-05-24 PROCEDURE — 94640 AIRWAY INHALATION TREATMENT: CPT

## 2025-05-24 PROCEDURE — 6370000000 HC RX 637 (ALT 250 FOR IP): Performed by: INTERNAL MEDICINE

## 2025-05-24 PROCEDURE — 85610 PROTHROMBIN TIME: CPT

## 2025-05-24 PROCEDURE — 94660 CPAP INITIATION&MGMT: CPT

## 2025-05-24 RX ORDER — METHYLPREDNISOLONE SODIUM SUCCINATE 40 MG/ML
40 INJECTION INTRAMUSCULAR; INTRAVENOUS DAILY
Status: DISCONTINUED | OUTPATIENT
Start: 2025-05-24 | End: 2025-05-26

## 2025-05-24 RX ORDER — INSULIN LISPRO 100 [IU]/ML
7 INJECTION, SOLUTION INTRAVENOUS; SUBCUTANEOUS
Status: DISCONTINUED | OUTPATIENT
Start: 2025-05-24 | End: 2025-05-27 | Stop reason: HOSPADM

## 2025-05-24 RX ADMIN — PANTOPRAZOLE SODIUM 40 MG: 40 TABLET, DELAYED RELEASE ORAL at 09:28

## 2025-05-24 RX ADMIN — INSULIN LISPRO 7 UNITS: 100 INJECTION, SOLUTION INTRAVENOUS; SUBCUTANEOUS at 09:27

## 2025-05-24 RX ADMIN — ARFORMOTEROL TARTRATE 15 MCG: 15 SOLUTION RESPIRATORY (INHALATION) at 20:17

## 2025-05-24 RX ADMIN — SODIUM CHLORIDE, PRESERVATIVE FREE 10 ML: 5 INJECTION INTRAVENOUS at 09:28

## 2025-05-24 RX ADMIN — DOXYCYCLINE 100 MG: 100 INJECTION, POWDER, LYOPHILIZED, FOR SOLUTION INTRAVENOUS at 05:48

## 2025-05-24 RX ADMIN — DOXYCYCLINE 100 MG: 100 INJECTION, POWDER, LYOPHILIZED, FOR SOLUTION INTRAVENOUS at 15:57

## 2025-05-24 RX ADMIN — BUDESONIDE 500 MCG: 0.5 SUSPENSION RESPIRATORY (INHALATION) at 20:16

## 2025-05-24 RX ADMIN — INSULIN LISPRO 4 UNITS: 100 INJECTION, SOLUTION INTRAVENOUS; SUBCUTANEOUS at 15:46

## 2025-05-24 RX ADMIN — BUMETANIDE 2 MG: 1 TABLET ORAL at 09:28

## 2025-05-24 RX ADMIN — INSULIN LISPRO 8 UNITS: 100 INJECTION, SOLUTION INTRAVENOUS; SUBCUTANEOUS at 06:44

## 2025-05-24 RX ADMIN — MICONAZOLE NITRATE 1 APPLICATION: 20 POWDER TOPICAL at 09:29

## 2025-05-24 RX ADMIN — INSULIN LISPRO 8 UNITS: 100 INJECTION, SOLUTION INTRAVENOUS; SUBCUTANEOUS at 11:29

## 2025-05-24 RX ADMIN — CEFEPIME 2000 MG: 2 INJECTION, POWDER, FOR SOLUTION INTRAVENOUS at 15:55

## 2025-05-24 RX ADMIN — IPRATROPIUM BROMIDE AND ALBUTEROL SULFATE 1 DOSE: .5; 2.5 SOLUTION RESPIRATORY (INHALATION) at 20:16

## 2025-05-24 RX ADMIN — TAMSULOSIN HYDROCHLORIDE 0.4 MG: 0.4 CAPSULE ORAL at 09:28

## 2025-05-24 RX ADMIN — INSULIN LISPRO 8 UNITS: 100 INJECTION, SOLUTION INTRAVENOUS; SUBCUTANEOUS at 22:01

## 2025-05-24 RX ADMIN — ASPIRIN 81 MG: 81 TABLET, COATED ORAL at 15:47

## 2025-05-24 RX ADMIN — ACETAMINOPHEN 650 MG: 325 TABLET ORAL at 03:46

## 2025-05-24 RX ADMIN — INSULIN LISPRO 7 UNITS: 100 INJECTION, SOLUTION INTRAVENOUS; SUBCUTANEOUS at 15:46

## 2025-05-24 RX ADMIN — FERROUS SULFATE TAB 325 MG (65 MG ELEMENTAL FE) 325 MG: 325 (65 FE) TAB at 09:28

## 2025-05-24 RX ADMIN — METHYLPREDNISOLONE SODIUM SUCCINATE 40 MG: 40 INJECTION INTRAMUSCULAR; INTRAVENOUS at 09:28

## 2025-05-24 RX ADMIN — MICONAZOLE NITRATE 1 APPLICATION: 20 POWDER TOPICAL at 22:02

## 2025-05-24 RX ADMIN — FOLIC ACID 1 MG: 1 TABLET ORAL at 09:28

## 2025-05-24 RX ADMIN — SODIUM CHLORIDE, PRESERVATIVE FREE 10 ML: 5 INJECTION INTRAVENOUS at 22:02

## 2025-05-24 RX ADMIN — CEFEPIME 2000 MG: 2 INJECTION, POWDER, FOR SOLUTION INTRAVENOUS at 05:46

## 2025-05-24 RX ADMIN — INSULIN GLARGINE 20 UNITS: 100 INJECTION, SOLUTION SUBCUTANEOUS at 09:27

## 2025-05-24 RX ADMIN — ASPIRIN 81 MG: 81 TABLET, COATED ORAL at 09:28

## 2025-05-24 RX ADMIN — INSULIN LISPRO 7 UNITS: 100 INJECTION, SOLUTION INTRAVENOUS; SUBCUTANEOUS at 11:31

## 2025-05-24 NOTE — PROGRESS NOTES
Pulmonary Progress Note    Admit Date: 2025  Hospital day                               PCP: Geoff Kauffman DO    Chief Complaint (s):  Patient Active Problem List   Diagnosis    Morbid obesity with BMI of 60.0-69.9, adult (HCC)    SUKHWINDER (obstructive sleep apnea)    Diabetes mellitus type 2, uncontrolled    Coronary artery disease involving native coronary artery without angina pectoris    Hyperlipidemia LDL goal <100    Essential hypertension    Acute kidney injury    Acute diastolic heart failure (HCC)    Pulmonary embolism, bilateral (HCC)    Neuropathy of right sciatic nerve    Generalized weakness    Physical deconditioning    Displaced fracture of proximal end of humerus    Ankle fracture, left, open type III, initial encounter    Anemia    Drug-induced constipation    BRBPR (bright red blood per rectum)    Acute hypoxic respiratory failure (HCC)    Congestive heart failure (HCC)    Acute respiratory failure with hypoxia and hypercapnia (HCC)    Hyperkalemia    Rhinovirus infection    REYNOLD (acute kidney injury)    Diarrhea of presumed infectious origin       Subjective:  Woke up feeling kind of funky...       Vitals:  VITALS:  BP (!) 144/66   Pulse 81   Temp 98.4 °F (36.9 °C) (Oral)   Resp 18   Ht 1.778 m (5' 10\")   Wt (!) 191.7 kg (422 lb 9.6 oz)   SpO2 97%   BMI 60.64 kg/m²     24HR INTAKE/OUTPUT:      Intake/Output Summary (Last 24 hours) at 2025 1632  Last data filed at 2025 1517  Gross per 24 hour   Intake --   Output 3900 ml   Net -3900 ml       24HR PULSE OXIMETRY RANGE:    SpO2  Av.6 %  Min: 91 %  Max: 97 %    Medications:  IV:   sodium chloride      dextrose         Scheduled Meds:   methylPREDNISolone  40 mg IntraVENous Daily    insulin lispro  7 Units SubCUTAneous TID WC    insulin lispro  0-16 Units SubCUTAneous 4x Daily AC & HS    insulin glargine  20 Units SubCUTAneous Daily    polycarbophil  625 mg Oral Daily    bumetanide  2 mg Oral Daily     cefepime  2,000 mg IntraVENous Q12H    pantoprazole  40 mg Oral Daily    pantoprazole  20 mg Oral Once    budesonide  0.5 mg Nebulization BID RT    arformoterol tartrate  15 mcg Nebulization BID RT    sodium chloride flush  5-40 mL IntraVENous 2 times per day    doxycycline (VIBRAMYCIN) IV  100 mg IntraVENous Q12H    aspirin  81 mg Oral BID    [Held by provider] furosemide  20 mg Oral Daily    [Held by provider] metoprolol tartrate  12.5 mg Oral BID    miconazole  1 Application Topical BID    tamsulosin  0.4 mg Oral Daily    ferrous sulfate  325 mg Oral Daily with breakfast    folic acid  1 mg Oral Daily    ipratropium 0.5 mg-albuterol 2.5 mg  1 Dose Inhalation Q4H WA RT    warfarin placeholder: dosing by pharmacy   Oral RX Placeholder       Diet:   ADULT ORAL NUTRITION SUPPLEMENT; Breakfast, Dinner; Wound Healing Oral Supplement  ADULT ORAL NUTRITION SUPPLEMENT; Lunch; Fortified Gelatin Oral Supplement  ADULT DIET; Regular; Low Potassium (Less than 3000 mg/day)         EXAM:  General: No distress. Alert.  Eyes: PERRL. No sclera icterus. No conjunctival injection.  ENT: No discharge. Pharynx clear.  Neck: Trachea midline. Normal thyroid.  Resp: No accessory muscle use. no rales. diffuse wheezing. no rhonchi.   CV: Regular rate. Regular rhythm. No murmur or rub.   Abd: Non-tender. Non-distended. No masses. No organomegaly. Normal bowel sounds.   Skin: Warm and dry. No nodule on exposed extremities. No rash on exposed extremities.  Ext: No cyanosis, clubbing, (+) BLE edema.   Lymph: No cervical LAD. No supraclavicular LAD.   M/S: No cyanosis. No joint deformity. No clubbing.   Neuro: Awake. Follows commands. Positive pupils/gag/corneals. Normal pain response.       Results:  CBC:   Recent Labs     05/22/25  0430 05/23/25  0545 05/24/25  0316   WBC 6.1 6.7 6.3   HGB 10.6* 10.4* 9.9*   HCT 37.6 36.7* 35.8*   MCV 71.5* 71.8* 72.0*     BMP:   Recent Labs     05/22/25  1714 05/23/25  0545 05/24/25  0316    137 139

## 2025-05-24 NOTE — PROGRESS NOTES
Patient educated multiple times on importance of turning every 2 hours, wearing bilateral heel protectors, and elevating bilateral heels off bed. Patient verbalized understanding. Educated patient on condition of his skin and importance of preventing skin break down. Patient verbalized understanding and still adamantly refusing q2h turn w/ taps, bilateral heel protectors, and bilateral lower extremities to be elevated on pillow/float heels off of bed.

## 2025-05-24 NOTE — PROGRESS NOTES
Department of Internal Medicine  Nephrology Progress Note      Events reviewed     CHIEF COMPLAINT:  We are following Mr. Mcdonald for REYNOLD and hyperkalemia. He has no complaints today.     PHYSICAL EXAM:      Vitals:    VITALS:  BP (!) 141/76   Pulse 79   Temp 98.6 °F (37 °C) (Oral)   Resp 20   Ht 1.778 m (5' 10\")   Wt (!) 191.7 kg (422 lb 9.6 oz)   SpO2 92%   BMI 60.64 kg/m²   24HR INTAKE/OUTPUT:    Intake/Output Summary (Last 24 hours) at 5/24/2025 1006  Last data filed at 5/24/2025 0937  Gross per 24 hour   Intake 240 ml   Output 2800 ml   Net -2560 ml         Constitutional: Patient is awake, alert, no distress  HEENT: Pupils are equal reactive  Respiratory: Decreased breath sounds remains  Cardiovascular/Edema: Heart sounds are regular  Gastrointestinal: Abdomen soft, 1+ abdominal wall edema  Neurologic: Nonfocal  Skin: No skin rashes  Other: Trace edema, 1+ abdominal wall edema    Scheduled Meds:   methylPREDNISolone  40 mg IntraVENous Daily    insulin lispro  7 Units SubCUTAneous TID WC    insulin lispro  0-16 Units SubCUTAneous 4x Daily AC & HS    insulin glargine  20 Units SubCUTAneous Daily    polycarbophil  625 mg Oral Daily    bumetanide  2 mg Oral Daily    cefepime  2,000 mg IntraVENous Q12H    pantoprazole  40 mg Oral Daily    pantoprazole  20 mg Oral Once    budesonide  0.5 mg Nebulization BID RT    arformoterol tartrate  15 mcg Nebulization BID RT    sodium chloride flush  5-40 mL IntraVENous 2 times per day    doxycycline (VIBRAMYCIN) IV  100 mg IntraVENous Q12H    aspirin  81 mg Oral BID    [Held by provider] furosemide  20 mg Oral Daily    [Held by provider] metoprolol tartrate  12.5 mg Oral BID    miconazole  1 Application Topical BID    tamsulosin  0.4 mg Oral Daily    ferrous sulfate  325 mg Oral Daily with breakfast    folic acid  1 mg Oral Daily    ipratropium 0.5 mg-albuterol 2.5 mg  1 Dose Inhalation Q4H WA RT    warfarin placeholder: dosing by pharmacy   Oral RX Placeholder

## 2025-05-24 NOTE — PROGRESS NOTES
[facility] Hospitalist Progress Note    Admitting Date and Time: 5/20/2025 10:38 PM  Admit Dx: Hyperkalemia [E87.5]  Urinary retention [R33.9]  REYNOLD (acute kidney injury) [N17.9]  Acute respiratory failure with hypoxia and hypercapnia (HCC) [J96.01, J96.02]  Wound of foot [S91.309A]    Subjective:  Patient is being followed for Hyperkalemia [E87.5]  Urinary retention [R33.9]  REYNOLD (acute kidney injury) [N17.9]  Acute respiratory failure with hypoxia and hypercapnia (HCC) [J96.01, J96.02]  Wound of foot [S91.309A]   Pt was seen and examined today.  Denies any new issues.    ROS: denies fever, chills, cp, sob, n/v, HA unless stated above.     methylPREDNISolone  40 mg IntraVENous Daily    insulin lispro  7 Units SubCUTAneous TID WC    insulin lispro  0-16 Units SubCUTAneous 4x Daily AC & HS    insulin glargine  20 Units SubCUTAneous Daily    polycarbophil  625 mg Oral Daily    bumetanide  2 mg Oral Daily    cefepime  2,000 mg IntraVENous Q12H    pantoprazole  40 mg Oral Daily    pantoprazole  20 mg Oral Once    budesonide  0.5 mg Nebulization BID RT    arformoterol tartrate  15 mcg Nebulization BID RT    sodium chloride flush  5-40 mL IntraVENous 2 times per day    doxycycline (VIBRAMYCIN) IV  100 mg IntraVENous Q12H    aspirin  81 mg Oral BID    [Held by provider] furosemide  20 mg Oral Daily    [Held by provider] metoprolol tartrate  12.5 mg Oral BID    miconazole  1 Application Topical BID    tamsulosin  0.4 mg Oral Daily    ferrous sulfate  325 mg Oral Daily with breakfast    folic acid  1 mg Oral Daily    ipratropium 0.5 mg-albuterol 2.5 mg  1 Dose Inhalation Q4H WA RT    warfarin placeholder: dosing by pharmacy   Oral RX Placeholder     fluticasone, 2 spray, Daily PRN  docusate sodium, 100 mg, Daily PRN  lactulose, 20 g, Daily PRN  loperamide, 2 mg, 4x Daily PRN  sodium chloride flush, 5-40 mL, PRN  sodium chloride, , PRN  ondansetron, 4 mg, Q8H PRN   Or  ondansetron, 4 mg, Q6H PRN  polyethylene glycol,  17 g, Daily PRN  acetaminophen, 650 mg, Q6H PRN   Or  acetaminophen, 650 mg, Q6H PRN  glucose, 4 tablet, PRN  dextrose bolus, 125 mL, PRN   Or  dextrose bolus, 250 mL, PRN  glucagon (rDNA), 1 mg, PRN  dextrose, , Continuous PRN  melatonin, 9 mg, Nightly PRN         Objective:    /81   Pulse 84   Temp 98.1 °F (36.7 °C) (Oral)   Resp 18   Ht 1.778 m (5' 10\")   Wt (!) 191.7 kg (422 lb 9.6 oz)   SpO2 95%   BMI 60.64 kg/m²     General Appearance: alert and oriented to person, place and time and in no acute distress  Skin: warm and dry  Head: normocephalic and atraumatic  Pulmonary/Chest: clear to auscultation bilaterally- no wheezes, rales or rhonchi, normal air movement, no respiratory distress  Cardiovascular: normal rate, normal S1 and S2  Abdomen: soft, non-tender, non-distended, hyperactive bowel sounds  Extremities: no cyanosis, no clubbing and no edema      Recent Labs     05/22/25  1714 05/23/25  0545 05/24/25  0316    137 139   K 5.3* 4.5 4.9   CL 99 98 100   CO2 28 30* 30*   BUN 62* 62* 61*   CREATININE 1.9* 1.9* 1.9*   GLUCOSE 447* 366* 259*   CALCIUM 8.2* 8.0* 8.0*       Recent Labs     05/22/25  0430 05/23/25  0545 05/24/25  0316   WBC 6.1 6.7 6.3   RBC 5.26 5.11 4.97   HGB 10.6* 10.4* 9.9*   HCT 37.6 36.7* 35.8*   MCV 71.5* 71.8* 72.0*   MCH 20.2* 20.4* 19.9*   MCHC 28.2* 28.3* 27.7*   RDW 24.7* 24.5* 24.0*   MPV Can not be calculated Can not be calculated Can not be calculated       Radiology:   CT HEAD WO CONTRAST   Final Result   No acute intracranial abnormality.         CT CHEST WO CONTRAST   Final Result   1. Interval development of some increased markings identified within the left   lung base concerning for infiltrate such as possible aspiration.   2. Cholelithiasis.   3. Stable heterogeneous enlargement and nodularity throughout the left lobe   of the thyroid in which ultrasound correlation on a nonemergent basis is   suggested.   4. Stable enlarged ascending thoracic aorta

## 2025-05-24 NOTE — PROGRESS NOTES
Pharmacy Consultation Note  (Warfarin Dosing and Monitoring)    Initial consult date: 5/21/2025  Consulting Provider: JAMIE Mcleod-JING    Daniel Mcdonald is a 69 y.o. male for whom pharmacy has been asked to manage warfarin therapy.     Weight:   Wt Readings from Last 1 Encounters:   05/24/25 (!) 191.7 kg (422 lb 9.6 oz)       TSH:    Lab Results   Component Value Date/Time    TSH 1.19 05/20/2025 11:57 PM       Hepatic Function Panel:                            Lab Results   Component Value Date/Time    ALKPHOS 78 05/20/2025 11:57 PM    ALT 8 05/20/2025 11:57 PM    AST 17 05/20/2025 11:57 PM    BILITOT 0.4 05/20/2025 11:57 PM    BILIDIR 0.1 04/03/2025 05:20 AM       Current significant warfarin drug-drug interactions include:   -Methylprednisolone, cefepime, doxycycline: may increase anticoagulant effects of warfarin.     Recent Labs     05/22/25  0430 05/23/25  0545 05/24/25  0316   HGB 10.6* 10.4* 9.9*     Date Warfarin Dose INR Heparin or LMWH Comment   5/21 1 mg 3.2  (5/20) --    5/22 NO DOSE 5.6 --    5/23 NO DOSE 5.6 --    5/24 NO DOSE 4.4 x                                  Assessment:  Patient is a 69 y.o. male on warfarin for History of  VTE/PE.  Patient's home warfarin dosing regimen is documented as warfarin 3 mg daily per SNF documentation.   Goal INR 2 - 3  INR 4.4 today    Plan:  No warfarin again tonight.   Daily PT/INR until the INR is stable within the therapeutic range  Pharmacist will follow and monitor/adjust dosing as necessary    Erasmo Moore RPh  5/24/2025  11:53 AM    SEB: [phone]  SEY: [phone]  SJW: [phone]

## 2025-05-24 NOTE — PROGRESS NOTES
Daily    cefepime  2,000 mg IntraVENous Q12H    pantoprazole  40 mg Oral Daily    pantoprazole  20 mg Oral Once    budesonide  0.5 mg Nebulization BID RT    arformoterol tartrate  15 mcg Nebulization BID RT    sodium chloride flush  5-40 mL IntraVENous 2 times per day    doxycycline (VIBRAMYCIN) IV  100 mg IntraVENous Q12H    aspirin  81 mg Oral BID    [Held by provider] furosemide  20 mg Oral Daily    [Held by provider] metoprolol tartrate  12.5 mg Oral BID    miconazole  1 Application Topical BID    tamsulosin  0.4 mg Oral Daily    ferrous sulfate  325 mg Oral Daily with breakfast    folic acid  1 mg Oral Daily    ipratropium 0.5 mg-albuterol 2.5 mg  1 Dose Inhalation Q4H WA RT    warfarin placeholder: dosing by pharmacy   Oral RX Placeholder       Diet:   ADULT ORAL NUTRITION SUPPLEMENT; Breakfast, Dinner; Wound Healing Oral Supplement  ADULT ORAL NUTRITION SUPPLEMENT; Lunch; Fortified Gelatin Oral Supplement  ADULT DIET; Regular; Low Potassium (Less than 3000 mg/day)         EXAM:  General: No distress. Alert.  Eyes: PERRL. No sclera icterus. No conjunctival injection.  ENT: No discharge. Pharynx clear.  Neck: Trachea midline. Normal thyroid.  Resp: No accessory muscle use. no rales. diffuse wheezing. no rhonchi.   CV: Regular rate. Regular rhythm. No murmur or rub.   Abd: Non-tender. Non-distended. No masses. No organomegaly. Normal bowel sounds.   Skin: Warm and dry. No nodule on exposed extremities. No rash on exposed extremities.  Ext: No cyanosis, clubbing, (+) BLE edema.   Lymph: No cervical LAD. No supraclavicular LAD.   M/S: No cyanosis. No joint deformity. No clubbing.   Neuro: Awake. Follows commands. Positive pupils/gag/corneals. Normal pain response.       Results:  CBC:   Recent Labs     05/22/25  0430 05/23/25  0545 05/24/25  0316   WBC 6.1 6.7 6.3   HGB 10.6* 10.4* 9.9*   HCT 37.6 36.7* 35.8*   MCV 71.5* 71.8* 72.0*     BMP:   Recent Labs     05/22/25  1714 05/23/25  0545 05/24/25  0316     137 139   K 5.3* 4.5 4.9   CL 99 98 100   CO2 28 30* 30*   BUN 62* 62* 61*   CREATININE 1.9* 1.9* 1.9*     LIVER PROFILE:   No results for input(s): \"AST\", \"ALT\", \"LIPASE\", \"AMYLASE\", \"BILIDIR\", \"BILITOT\", \"ALKPHOS\" in the last 72 hours.    Invalid input(s): \"ALB\"    PT/INR:   Recent Labs     05/22/25  0430 05/23/25  0545 05/24/25  0316   PROTIME 61.1* 61.0* 48.4*   INR 5.6* 5.6* 4.4     APTT: No results for input(s): \"APTT\" in the last 72 hours.      Pathology:  N/A      Microbiology:  None    Recent ABG:   Recent Labs     05/21/25 2110   PH 7.288*   PO2 56.3*   PCO2 65.3*   HCO3 30.6*   BE 2.4   O2SAT 87.4*   METHB 0.3   O2HB 86.1*   COHB 1.2   O2CON 14.9   HHB 12.4*   THB 12.3     FiO2 : 40 %       Recent Films:  CT HEAD WO CONTRAST   Final Result   No acute intracranial abnormality.         CT CHEST WO CONTRAST   Final Result   1. Interval development of some increased markings identified within the left   lung base concerning for infiltrate such as possible aspiration.   2. Cholelithiasis.   3. Stable heterogeneous enlargement and nodularity throughout the left lobe   of the thyroid in which ultrasound correlation on a nonemergent basis is   suggested.   4. Stable enlarged ascending thoracic aorta largest AP dimension measuring   4.6 cm.         XR CHEST PORTABLE   Final Result   Mild pulmonary edema               Assessment:  Bronchospasm with underlying rhinovirus.   Left lower lobe infiltrate.   Obstructive sleep apnea with obesity hypoventilation syndrome.       Plan:  Continue budesonide and brovana   Continue duoneb  Continue intravenous steroids. If improved tomorrow consider weaning.  Wean supplemental oxygen.   NIV at HS and daytime naps       Time at the bedside, reviewing labs and radiographs, reviewing updated notes and consultations, discussing with staff and family was more than 50 minutes.    Please note that voice recognition technology was used in the preparation of this note and make

## 2025-05-25 LAB
ANION GAP SERPL CALCULATED.3IONS-SCNC: 9 MMOL/L (ref 7–16)
BNP SERPL-MCNC: 2565 PG/ML (ref 0–125)
BUN SERPL-MCNC: 68 MG/DL (ref 6–23)
CALCIUM SERPL-MCNC: 8.6 MG/DL (ref 8.6–10.2)
CHLORIDE SERPL-SCNC: 101 MMOL/L (ref 98–107)
CO2 SERPL-SCNC: 29 MMOL/L (ref 22–29)
CREAT SERPL-MCNC: 1.7 MG/DL (ref 0.7–1.2)
GFR, ESTIMATED: 43 ML/MIN/1.73M2
GLUCOSE BLD-MCNC: 225 MG/DL (ref 74–99)
GLUCOSE BLD-MCNC: 225 MG/DL (ref 74–99)
GLUCOSE BLD-MCNC: 231 MG/DL (ref 74–99)
GLUCOSE BLD-MCNC: 305 MG/DL (ref 74–99)
GLUCOSE SERPL-MCNC: 195 MG/DL (ref 74–99)
INR PPP: 3.3
POTASSIUM SERPL-SCNC: 4.6 MMOL/L (ref 3.5–5)
PROTHROMBIN TIME: 36 SEC (ref 9.3–12.4)
SODIUM SERPL-SCNC: 139 MMOL/L (ref 132–146)

## 2025-05-25 PROCEDURE — 82962 GLUCOSE BLOOD TEST: CPT

## 2025-05-25 PROCEDURE — 6370000000 HC RX 637 (ALT 250 FOR IP): Performed by: NURSE PRACTITIONER

## 2025-05-25 PROCEDURE — 85610 PROTHROMBIN TIME: CPT

## 2025-05-25 PROCEDURE — 6370000000 HC RX 637 (ALT 250 FOR IP): Performed by: STUDENT IN AN ORGANIZED HEALTH CARE EDUCATION/TRAINING PROGRAM

## 2025-05-25 PROCEDURE — 2580000003 HC RX 258: Performed by: STUDENT IN AN ORGANIZED HEALTH CARE EDUCATION/TRAINING PROGRAM

## 2025-05-25 PROCEDURE — 6370000000 HC RX 637 (ALT 250 FOR IP)

## 2025-05-25 PROCEDURE — 2060000000 HC ICU INTERMEDIATE R&B

## 2025-05-25 PROCEDURE — 83880 ASSAY OF NATRIURETIC PEPTIDE: CPT

## 2025-05-25 PROCEDURE — 6360000002 HC RX W HCPCS

## 2025-05-25 PROCEDURE — 36415 COLL VENOUS BLD VENIPUNCTURE: CPT

## 2025-05-25 PROCEDURE — 6370000000 HC RX 637 (ALT 250 FOR IP): Performed by: INTERNAL MEDICINE

## 2025-05-25 PROCEDURE — 2580000003 HC RX 258

## 2025-05-25 PROCEDURE — 2700000000 HC OXYGEN THERAPY PER DAY

## 2025-05-25 PROCEDURE — 80048 BASIC METABOLIC PNL TOTAL CA: CPT

## 2025-05-25 PROCEDURE — 2500000003 HC RX 250 WO HCPCS

## 2025-05-25 PROCEDURE — 99232 SBSQ HOSP IP/OBS MODERATE 35: CPT | Performed by: STUDENT IN AN ORGANIZED HEALTH CARE EDUCATION/TRAINING PROGRAM

## 2025-05-25 PROCEDURE — 92526 ORAL FUNCTION THERAPY: CPT

## 2025-05-25 PROCEDURE — 6360000002 HC RX W HCPCS: Performed by: STUDENT IN AN ORGANIZED HEALTH CARE EDUCATION/TRAINING PROGRAM

## 2025-05-25 PROCEDURE — 94660 CPAP INITIATION&MGMT: CPT

## 2025-05-25 RX ORDER — GUAIFENESIN 400 MG/1
400 TABLET ORAL 4 TIMES DAILY
Status: DISCONTINUED | OUTPATIENT
Start: 2025-05-25 | End: 2025-05-27 | Stop reason: HOSPADM

## 2025-05-25 RX ADMIN — CEFEPIME 2000 MG: 2 INJECTION, POWDER, FOR SOLUTION INTRAVENOUS at 05:40

## 2025-05-25 RX ADMIN — ANORECTAL OINTMENT: 15.7; .44; 24; 20.6 OINTMENT TOPICAL at 20:59

## 2025-05-25 RX ADMIN — CEFEPIME 2000 MG: 2 INJECTION, POWDER, FOR SOLUTION INTRAVENOUS at 16:51

## 2025-05-25 RX ADMIN — INSULIN LISPRO 7 UNITS: 100 INJECTION, SOLUTION INTRAVENOUS; SUBCUTANEOUS at 06:41

## 2025-05-25 RX ADMIN — DOXYCYCLINE 100 MG: 100 INJECTION, POWDER, LYOPHILIZED, FOR SOLUTION INTRAVENOUS at 16:52

## 2025-05-25 RX ADMIN — TAMSULOSIN HYDROCHLORIDE 0.4 MG: 0.4 CAPSULE ORAL at 10:04

## 2025-05-25 RX ADMIN — SODIUM CHLORIDE, PRESERVATIVE FREE 10 ML: 5 INJECTION INTRAVENOUS at 20:59

## 2025-05-25 RX ADMIN — INSULIN LISPRO 7 UNITS: 100 INJECTION, SOLUTION INTRAVENOUS; SUBCUTANEOUS at 16:48

## 2025-05-25 RX ADMIN — ACETAMINOPHEN 650 MG: 325 TABLET ORAL at 10:04

## 2025-05-25 RX ADMIN — ASPIRIN 81 MG: 81 TABLET, COATED ORAL at 10:04

## 2025-05-25 RX ADMIN — FERROUS SULFATE TAB 325 MG (65 MG ELEMENTAL FE) 325 MG: 325 (65 FE) TAB at 10:03

## 2025-05-25 RX ADMIN — MICONAZOLE NITRATE 1 APPLICATION: 20 POWDER TOPICAL at 20:59

## 2025-05-25 RX ADMIN — FOLIC ACID 1 MG: 1 TABLET ORAL at 10:04

## 2025-05-25 RX ADMIN — SODIUM CHLORIDE, PRESERVATIVE FREE 10 ML: 5 INJECTION INTRAVENOUS at 10:05

## 2025-05-25 RX ADMIN — DOXYCYCLINE 100 MG: 100 INJECTION, POWDER, LYOPHILIZED, FOR SOLUTION INTRAVENOUS at 05:39

## 2025-05-25 RX ADMIN — INSULIN LISPRO 4 UNITS: 100 INJECTION, SOLUTION INTRAVENOUS; SUBCUTANEOUS at 09:56

## 2025-05-25 RX ADMIN — INSULIN LISPRO 7 UNITS: 100 INJECTION, SOLUTION INTRAVENOUS; SUBCUTANEOUS at 10:03

## 2025-05-25 RX ADMIN — MICONAZOLE NITRATE 1 APPLICATION: 20 POWDER TOPICAL at 09:58

## 2025-05-25 RX ADMIN — METHYLPREDNISOLONE SODIUM SUCCINATE 40 MG: 40 INJECTION INTRAMUSCULAR; INTRAVENOUS at 09:57

## 2025-05-25 RX ADMIN — INSULIN LISPRO 4 UNITS: 100 INJECTION, SOLUTION INTRAVENOUS; SUBCUTANEOUS at 16:48

## 2025-05-25 RX ADMIN — GUAIFENESIN 400 MG: 400 TABLET ORAL at 16:49

## 2025-05-25 RX ADMIN — ASPIRIN 81 MG: 81 TABLET, COATED ORAL at 16:48

## 2025-05-25 RX ADMIN — PANTOPRAZOLE SODIUM 40 MG: 40 TABLET, DELAYED RELEASE ORAL at 10:04

## 2025-05-25 RX ADMIN — INSULIN LISPRO 4 UNITS: 100 INJECTION, SOLUTION INTRAVENOUS; SUBCUTANEOUS at 06:40

## 2025-05-25 RX ADMIN — INSULIN LISPRO 12 UNITS: 100 INJECTION, SOLUTION INTRAVENOUS; SUBCUTANEOUS at 20:48

## 2025-05-25 RX ADMIN — INSULIN GLARGINE 20 UNITS: 100 INJECTION, SOLUTION SUBCUTANEOUS at 09:56

## 2025-05-25 RX ADMIN — BUMETANIDE 2 MG: 1 TABLET ORAL at 10:04

## 2025-05-25 ASSESSMENT — PAIN DESCRIPTION - ONSET: ONSET: ON-GOING

## 2025-05-25 ASSESSMENT — PAIN DESCRIPTION - PAIN TYPE: TYPE: ACUTE PAIN

## 2025-05-25 ASSESSMENT — PAIN SCALES - GENERAL: PAINLEVEL_OUTOF10: 3

## 2025-05-25 ASSESSMENT — PAIN - FUNCTIONAL ASSESSMENT: PAIN_FUNCTIONAL_ASSESSMENT: ACTIVITIES ARE NOT PREVENTED

## 2025-05-25 ASSESSMENT — PAIN DESCRIPTION - FREQUENCY: FREQUENCY: INTERMITTENT

## 2025-05-25 ASSESSMENT — PAIN DESCRIPTION - LOCATION: LOCATION: ABDOMEN

## 2025-05-25 ASSESSMENT — PAIN DESCRIPTION - ORIENTATION: ORIENTATION: LEFT

## 2025-05-25 ASSESSMENT — PAIN DESCRIPTION - DESCRIPTORS: DESCRIPTORS: ACHING

## 2025-05-25 NOTE — PROGRESS NOTES
Pharmacy Consultation Note  (Warfarin Dosing and Monitoring)    Initial consult date: 5/21/2025  Consulting Provider: Brinda López, JAMIE-JING    Daniel Mcdonald is a 69 y.o. male for whom pharmacy has been asked to manage warfarin therapy.     Weight:   Wt Readings from Last 1 Encounters:   05/25/25 (!) 171.9 kg (379 lb)       TSH:    Lab Results   Component Value Date/Time    TSH 1.19 05/20/2025 11:57 PM       Hepatic Function Panel:                            Lab Results   Component Value Date/Time    ALKPHOS 78 05/20/2025 11:57 PM    ALT 8 05/20/2025 11:57 PM    AST 17 05/20/2025 11:57 PM    BILITOT 0.4 05/20/2025 11:57 PM    BILIDIR 0.1 04/03/2025 05:20 AM       Current significant warfarin drug-drug interactions include:   -Methylprednisolone, cefepime, doxycycline: may increase anticoagulant effects of warfarin.     Recent Labs     05/23/25  0545 05/24/25  0316   HGB 10.4* 9.9*     Date Warfarin Dose INR Heparin or LMWH Comment   5/21 1 mg 3.2  (5/20) --    5/22 NO DOSE 5.6 --    5/23 NO DOSE 5.6 --    5/24 NO DOSE 4.4 x    5/25 NO DOSE 3.3 x                           Assessment:  Patient is a 69 y.o. male on warfarin for History of  VTE/PE.  Patient's home warfarin dosing regimen is documented as warfarin 3 mg daily per SNF documentation.   Goal INR 2 - 3  5/25  INR is 3.3 today (still supratherapeutic), down from 4.4 yesterday.        Plan:  While other factors may have played into the increase in INR and it remaining supratherapeutic for 3 days after 1 mg warfarin on 5/21, will wait until INR is in the therapeutic range before resuming warfarin, which I expect will be tomorrow.   No warfarin again tonight.   Daily PT/INR until the INR is stable within the therapeutic range  Pharmacist will follow and monitor/adjust dosing as necessary    Erasmo Moore RPh  5/25/2025  10:06 AM    SEB: [phone]  SEY: [phone]  SJW: [phone]

## 2025-05-25 NOTE — PROGRESS NOTES
2 RN 4 eyes skin assessment done at this time w/ NIKITA Parry and nurses aides at bedside. Patient allowed assessment of buttocks/coccyx at this time. Coccyx/sacrum/bilateral buttocks/ and posterior bilateral upper thighs dark purple and blanchable. Skin cleaned and Calmoseptine applied to those areas. Micotin powder applied to abdominal folds that were red, moist, and open. Patient refused turn at this time and bilateral heel protectors. Patient only allowed clear optiview gel preventative dressing on right heel. Patient re-educated on importance of skin integrity preventions and potential risks. Patient verbalized understanding and still refused q2h turn at this time. Patient also educated on the importance of allowing nursing staff to assess skin on all shifts and throughout the shift. Patient verbalized understanding to this and told nursing staff they were \"junk yard bull dogs.\"

## 2025-05-25 NOTE — PROGRESS NOTES
[facility] Hospitalist Progress Note    Admitting Date and Time: 5/20/2025 10:38 PM  Admit Dx: Hyperkalemia [E87.5]  Urinary retention [R33.9]  REYNOLD (acute kidney injury) [N17.9]  Acute respiratory failure with hypoxia and hypercapnia (HCC) [J96.01, J96.02]  Wound of foot [S91.309A]    Subjective:  Patient is being followed for Hyperkalemia [E87.5]  Urinary retention [R33.9]  REYNOLD (acute kidney injury) [N17.9]  Acute respiratory failure with hypoxia and hypercapnia (HCC) [J96.01, J96.02]  Wound of foot [S91.309A]   Pt was seen and examined today.  Denies any new issues.    ROS: denies fever, chills, cp, sob, n/v, HA unless stated above.     cefepime  2,000 mg IntraVENous Q12H    menthol-zinc oxide   Topical BID    guaiFENesin  400 mg Oral 4x daily    methylPREDNISolone  40 mg IntraVENous Daily    insulin lispro  7 Units SubCUTAneous TID WC    insulin lispro  0-16 Units SubCUTAneous 4x Daily AC & HS    insulin glargine  20 Units SubCUTAneous Daily    polycarbophil  625 mg Oral Daily    bumetanide  2 mg Oral Daily    pantoprazole  40 mg Oral Daily    pantoprazole  20 mg Oral Once    budesonide  0.5 mg Nebulization BID RT    arformoterol tartrate  15 mcg Nebulization BID RT    sodium chloride flush  5-40 mL IntraVENous 2 times per day    doxycycline (VIBRAMYCIN) IV  100 mg IntraVENous Q12H    aspirin  81 mg Oral BID    [Held by provider] metoprolol tartrate  12.5 mg Oral BID    miconazole  1 Application Topical BID    tamsulosin  0.4 mg Oral Daily    ferrous sulfate  325 mg Oral Daily with breakfast    folic acid  1 mg Oral Daily    ipratropium 0.5 mg-albuterol 2.5 mg  1 Dose Inhalation Q4H WA RT    warfarin placeholder: dosing by pharmacy   Oral RX Placeholder     fluticasone, 2 spray, Daily PRN  docusate sodium, 100 mg, Daily PRN  lactulose, 20 g, Daily PRN  loperamide, 2 mg, 4x Daily PRN  sodium chloride flush, 5-40 mL, PRN  sodium chloride, , PRN  ondansetron, 4 mg, Q8H PRN   Or  ondansetron, 4 mg, Q6H  PRN  polyethylene glycol, 17 g, Daily PRN  acetaminophen, 650 mg, Q6H PRN   Or  acetaminophen, 650 mg, Q6H PRN  glucose, 4 tablet, PRN  dextrose bolus, 125 mL, PRN   Or  dextrose bolus, 250 mL, PRN  glucagon (rDNA), 1 mg, PRN  dextrose, , Continuous PRN  melatonin, 9 mg, Nightly PRN         Objective:    BP (!) 140/58   Pulse 81   Temp 98.1 °F (36.7 °C) (Oral)   Resp 20   Ht 1.778 m (5' 10\")   Wt (!) 171.9 kg (379 lb)   SpO2 93%   BMI 54.38 kg/m²     General Appearance: alert and oriented to person, place and time and in no acute distress  Skin: warm and dry  Head: normocephalic and atraumatic  Pulmonary/Chest: clear to auscultation bilaterally- no wheezes, rales or rhonchi, normal air movement, no respiratory distress  Cardiovascular: normal rate, normal S1 and S2  Abdomen: soft, non-tender, non-distended, hyperactive bowel sounds  Extremities: no cyanosis, no clubbing and no edema      Recent Labs     05/23/25  0545 05/24/25  0316 05/25/25  0438    139 139   K 4.5 4.9 4.6   CL 98 100 101   CO2 30* 30* 29   BUN 62* 61* 68*   CREATININE 1.9* 1.9* 1.7*   GLUCOSE 366* 259* 195*   CALCIUM 8.0* 8.0* 8.6       Recent Labs     05/23/25  0545 05/24/25  0316   WBC 6.7 6.3   RBC 5.11 4.97   HGB 10.4* 9.9*   HCT 36.7* 35.8*   MCV 71.8* 72.0*   MCH 20.4* 19.9*   MCHC 28.3* 27.7*   RDW 24.5* 24.0*   MPV Can not be calculated Can not be calculated       Radiology:   CT HEAD WO CONTRAST   Final Result   No acute intracranial abnormality.         CT CHEST WO CONTRAST   Final Result   1. Interval development of some increased markings identified within the left   lung base concerning for infiltrate such as possible aspiration.   2. Cholelithiasis.   3. Stable heterogeneous enlargement and nodularity throughout the left lobe   of the thyroid in which ultrasound correlation on a nonemergent basis is   suggested.   4. Stable enlarged ascending thoracic aorta largest AP dimension measuring   4.6 cm.         XR CHEST

## 2025-05-25 NOTE — PROGRESS NOTES
This RN and 2 nurses aides were at patient's bedside to provide patient a bed bath that he has refused multiple times earlier today. Educated patient that nursing wanted to apply protective barrier pharmaceutical cream to bilateral buttocks/ coccyx/posterior bilateral upper thighs, and to change patient's left heel/ankle dressing. Patient refused again stating \"I'm napping and you will have time later to do it, you'll figure it out, I am napping\" referring to nursing staff. Educated patient on importance of skin integrity interventions to prevent skin breakdown. Also educated patient on risks from skin breakdown such as infection, sepsis, osteomyelitis, etc... Patient verbalized understanding. Patient, again, adamantly refused turn and bath and said this RN was only allowed to change left heel/ankle dressing at this time.     Patient also at this time was instructed by this RN to wear his bipap if he was napping and re-educated patient on importance of wearing bipap when both napping during the day and sleeping at night d/t the possibility for increase buildup in CO2 that can cause lethargy/foggy-ness/ and confusion. Patient verbalized understanding and again, refused to wear his bipap.

## 2025-05-25 NOTE — PROGRESS NOTES
Pulmonary Progress Note    Admit Date: 2025  Hospital day                               PCP: Geoff Kauffman DO    Chief Complaint (s):  Patient Active Problem List   Diagnosis    Morbid obesity with BMI of 60.0-69.9, adult (HCC)    SUKHWINDER (obstructive sleep apnea)    Diabetes mellitus type 2, uncontrolled    Coronary artery disease involving native coronary artery without angina pectoris    Hyperlipidemia LDL goal <100    Essential hypertension    Acute kidney injury    Acute diastolic heart failure (HCC)    Pulmonary embolism, bilateral (HCC)    Neuropathy of right sciatic nerve    Generalized weakness    Physical deconditioning    Displaced fracture of proximal end of humerus    Ankle fracture, left, open type III, initial encounter    Anemia    Drug-induced constipation    BRBPR (bright red blood per rectum)    Acute hypoxic respiratory failure (HCC)    Congestive heart failure (HCC)    Acute respiratory failure with hypoxia and hypercapnia (HCC)    Hyperkalemia    Rhinovirus infection    REYNOLD (acute kidney injury)    Diarrhea of presumed infectious origin       Subjective:  Awake and alert, productive cough remains.    Vitals:  VITALS:  BP (!) 140/58   Pulse 81   Temp 98.1 °F (36.7 °C) (Oral)   Resp 20   Ht 1.778 m (5' 10\")   Wt (!) 171.9 kg (379 lb)   SpO2 93%   BMI 54.38 kg/m²     24HR INTAKE/OUTPUT:      Intake/Output Summary (Last 24 hours) at 2025 1503  Last data filed at 2025 1344  Gross per 24 hour   Intake --   Output 2400 ml   Net -2400 ml       24HR PULSE OXIMETRY RANGE:    SpO2  Av %  Min: 83 %  Max: 100 %    Medications:  IV:   sodium chloride      dextrose         Scheduled Meds:   cefepime  2,000 mg IntraVENous Q12H    menthol-zinc oxide   Topical BID    guaiFENesin  400 mg Oral 4x daily    methylPREDNISolone  40 mg IntraVENous Daily    insulin lispro  7 Units SubCUTAneous TID WC    insulin lispro  0-16 Units SubCUTAneous 4x Daily AC & HS    insulin  this note and make therefore it may contain inadvertent transcription errors.  If the patient is a COVID 19 isolation patient, the above physical exam reflects that of the examining physician for the day.      Sharath Workman MD,  M.EVERARDO., F.C.C.P.    [facility]    [facility], [address] [place] [address], [address], [place] [address]  Office visits:  [address] [place] [address]

## 2025-05-25 NOTE — PROGRESS NOTES
Patient re-educated again today about importance of promoting skin integrity and importance of interventions w/ turning, and dressing changes and increased risk of skin breakdown and infection if pt continues to not allow skin prevention interventions/turning q2h. Patient verbalized understanding and still refusing at this time.

## 2025-05-25 NOTE — PROGRESS NOTES
SPEECH LANGUAGE PATHOLOGY  DAILY PROGRESS NOTE      PATIENT NAME:  Daniel Mcdonald      :  1955          TODAY'S DATE:  2025 ROOM:  [facility]    Current Diet: ADULT ORAL NUTRITION SUPPLEMENT; Breakfast, Dinner; Wound Healing Oral Supplement  ADULT ORAL NUTRITION SUPPLEMENT; Lunch; Fortified Gelatin Oral Supplement  ADULT DIET; Regular; Low Potassium (Less than 3000 mg/day)    Patient was seen for ongoing dysphagia therapy. He was sitting upright in bed less than 75 degrees due to discomfort. During session, patient was alert and denied any difficulties with eating/drinking. He was administered trials of hard solid food and thin liquid via cup. No oral phase dysphagia or overt s/s of aspiration were observed.     Recommendation: Patient is recommended to continue a regular diet with thin liquid. He should sit upright during meals and take small bites/sips. SLP plans to sign off on patient as he is safely consuming the least restrictive diet.       CPT code(s) 58707  dysphagia tx  Total minutes :  15 minutes    Rashid Wing M.S., CCC-SLP/L   Speech-Language Pathologist  [phone]

## 2025-05-25 NOTE — PROGRESS NOTES
Department of Internal Medicine  Nephrology Progress Note      Events reviewed     CHIEF COMPLAINT:  We are following Mr. Mcdonald for REYNOLD and hyperkalemia. He has no complaints today.     PHYSICAL EXAM:      Vitals:    VITALS:  BP (!) 140/58   Pulse 81   Temp 98.1 °F (36.7 °C) (Oral)   Resp 20   Ht 1.778 m (5' 10\")   Wt (!) 171.9 kg (379 lb)   SpO2 93%   BMI 54.38 kg/m²   24HR INTAKE/OUTPUT:    Intake/Output Summary (Last 24 hours) at 5/25/2025 1042  Last data filed at 5/25/2025 1011  Gross per 24 hour   Intake --   Output 1500 ml   Net -1500 ml         Constitutional: Patient is awake, alert, no distress  HEENT: Pupils are equal reactive  Respiratory: Decreased breath sounds remains  Cardiovascular/Edema: Heart sounds are regular  Gastrointestinal: Abdomen soft, 1+ abdominal wall edema  Neurologic: Nonfocal  Skin: No skin rashes  Other: Trace edema, 1+ abdominal wall edema    Scheduled Meds:   cefepime  2,000 mg IntraVENous Q12H    menthol-zinc oxide   Topical BID    methylPREDNISolone  40 mg IntraVENous Daily    insulin lispro  7 Units SubCUTAneous TID WC    insulin lispro  0-16 Units SubCUTAneous 4x Daily AC & HS    insulin glargine  20 Units SubCUTAneous Daily    polycarbophil  625 mg Oral Daily    bumetanide  2 mg Oral Daily    pantoprazole  40 mg Oral Daily    pantoprazole  20 mg Oral Once    budesonide  0.5 mg Nebulization BID RT    arformoterol tartrate  15 mcg Nebulization BID RT    sodium chloride flush  5-40 mL IntraVENous 2 times per day    doxycycline (VIBRAMYCIN) IV  100 mg IntraVENous Q12H    aspirin  81 mg Oral BID    [Held by provider] metoprolol tartrate  12.5 mg Oral BID    miconazole  1 Application Topical BID    tamsulosin  0.4 mg Oral Daily    ferrous sulfate  325 mg Oral Daily with breakfast    folic acid  1 mg Oral Daily    ipratropium 0.5 mg-albuterol 2.5 mg  1 Dose Inhalation Q4H WA RT    warfarin placeholder: dosing by pharmacy   Oral RX Placeholder     Continuous Infusions:      Continue current meds  Good response to oral Bumex.  Continue to monitor renal function  Potasium in in normal range .  Discharge planning.  Discussed with Dr Cotter.          Hipolito Perry MD

## 2025-05-26 LAB
ANION GAP SERPL CALCULATED.3IONS-SCNC: 9 MMOL/L (ref 7–16)
ATYPICAL LYMPHOCYTE ABSOLUTE COUNT: 0.15 K/UL (ref 0–0.46)
ATYPICAL LYMPHOCYTES: 2 % (ref 0–4)
BASOPHILS # BLD: 0.07 K/UL (ref 0–0.2)
BASOPHILS NFR BLD: 1 % (ref 0–2)
BUN SERPL-MCNC: 70 MG/DL (ref 6–23)
CALCIUM SERPL-MCNC: 8.4 MG/DL (ref 8.6–10.2)
CHLORIDE SERPL-SCNC: 101 MMOL/L (ref 98–107)
CO2 SERPL-SCNC: 30 MMOL/L (ref 22–29)
CREAT SERPL-MCNC: 1.9 MG/DL (ref 0.7–1.2)
EOSINOPHIL # BLD: 0.15 K/UL (ref 0.05–0.5)
EOSINOPHILS RELATIVE PERCENT: 2 % (ref 0–6)
ERYTHROCYTE [DISTWIDTH] IN BLOOD BY AUTOMATED COUNT: 23.7 % (ref 11.5–15)
GFR, ESTIMATED: 38 ML/MIN/1.73M2
GLUCOSE BLD-MCNC: 130 MG/DL (ref 74–99)
GLUCOSE BLD-MCNC: 154 MG/DL (ref 74–99)
GLUCOSE BLD-MCNC: 208 MG/DL (ref 74–99)
GLUCOSE BLD-MCNC: 233 MG/DL (ref 74–99)
GLUCOSE BLD-MCNC: 353 MG/DL (ref 74–99)
GLUCOSE SERPL-MCNC: 227 MG/DL (ref 74–99)
HCT VFR BLD AUTO: 39 % (ref 37–54)
HGB BLD-MCNC: 10.8 G/DL (ref 12.5–16.5)
INR PPP: 2.3
LYMPHOCYTES NFR BLD: 1.25 K/UL (ref 1.5–4)
LYMPHOCYTES RELATIVE PERCENT: 15 % (ref 20–42)
MCH RBC QN AUTO: 19.9 PG (ref 26–35)
MCHC RBC AUTO-ENTMCNC: 27.7 G/DL (ref 32–34.5)
MCV RBC AUTO: 71.7 FL (ref 80–99.9)
MICROORGANISM SPEC CULT: NORMAL
MICROORGANISM SPEC CULT: NORMAL
MONOCYTES NFR BLD: 0.37 K/UL (ref 0.1–0.95)
MONOCYTES NFR BLD: 4 % (ref 2–12)
NEUTROPHILS NFR BLD: 77 % (ref 43–80)
NEUTS SEG NFR BLD: 6.52 K/UL (ref 1.8–7.3)
PLATELET, FLUORESCENCE: 470 K/UL (ref 130–450)
PMV BLD AUTO: ABNORMAL FL (ref 7–12)
POTASSIUM SERPL-SCNC: 4.2 MMOL/L (ref 3.5–5)
PROTHROMBIN TIME: 25.2 SEC (ref 9.3–12.4)
RBC # BLD AUTO: 5.44 M/UL (ref 3.8–5.8)
RBC # BLD: ABNORMAL 10*6/UL
SERVICE CMNT-IMP: NORMAL
SERVICE CMNT-IMP: NORMAL
SODIUM SERPL-SCNC: 140 MMOL/L (ref 132–146)
SPECIMEN DESCRIPTION: NORMAL
SPECIMEN DESCRIPTION: NORMAL
WBC OTHER # BLD: 8.5 K/UL (ref 4.5–11.5)

## 2025-05-26 PROCEDURE — 6370000000 HC RX 637 (ALT 250 FOR IP): Performed by: INTERNAL MEDICINE

## 2025-05-26 PROCEDURE — 80048 BASIC METABOLIC PNL TOTAL CA: CPT

## 2025-05-26 PROCEDURE — 6360000002 HC RX W HCPCS

## 2025-05-26 PROCEDURE — 6370000000 HC RX 637 (ALT 250 FOR IP): Performed by: STUDENT IN AN ORGANIZED HEALTH CARE EDUCATION/TRAINING PROGRAM

## 2025-05-26 PROCEDURE — 6370000000 HC RX 637 (ALT 250 FOR IP)

## 2025-05-26 PROCEDURE — 2580000003 HC RX 258

## 2025-05-26 PROCEDURE — 85025 COMPLETE CBC W/AUTO DIFF WBC: CPT

## 2025-05-26 PROCEDURE — 94660 CPAP INITIATION&MGMT: CPT

## 2025-05-26 PROCEDURE — 82962 GLUCOSE BLOOD TEST: CPT

## 2025-05-26 PROCEDURE — 2580000003 HC RX 258: Performed by: STUDENT IN AN ORGANIZED HEALTH CARE EDUCATION/TRAINING PROGRAM

## 2025-05-26 PROCEDURE — 2060000000 HC ICU INTERMEDIATE R&B

## 2025-05-26 PROCEDURE — 85610 PROTHROMBIN TIME: CPT

## 2025-05-26 PROCEDURE — 6360000002 HC RX W HCPCS: Performed by: STUDENT IN AN ORGANIZED HEALTH CARE EDUCATION/TRAINING PROGRAM

## 2025-05-26 PROCEDURE — 2500000003 HC RX 250 WO HCPCS

## 2025-05-26 PROCEDURE — 6370000000 HC RX 637 (ALT 250 FOR IP): Performed by: NURSE PRACTITIONER

## 2025-05-26 PROCEDURE — 2700000000 HC OXYGEN THERAPY PER DAY

## 2025-05-26 PROCEDURE — 99232 SBSQ HOSP IP/OBS MODERATE 35: CPT | Performed by: STUDENT IN AN ORGANIZED HEALTH CARE EDUCATION/TRAINING PROGRAM

## 2025-05-26 RX ORDER — SENNA AND DOCUSATE SODIUM 50; 8.6 MG/1; MG/1
2 TABLET, FILM COATED ORAL 2 TIMES DAILY
Status: DISCONTINUED | OUTPATIENT
Start: 2025-05-26 | End: 2025-05-27 | Stop reason: HOSPADM

## 2025-05-26 RX ORDER — PREDNISONE 5 MG/1
10 TABLET ORAL DAILY
Status: DISCONTINUED | OUTPATIENT
Start: 2025-06-03 | End: 2025-05-27 | Stop reason: HOSPADM

## 2025-05-26 RX ORDER — WARFARIN SODIUM 1 MG/1
1 TABLET ORAL
Status: COMPLETED | OUTPATIENT
Start: 2025-05-26 | End: 2025-05-26

## 2025-05-26 RX ORDER — PREDNISONE 20 MG/1
20 TABLET ORAL DAILY
Status: DISCONTINUED | OUTPATIENT
Start: 2025-05-31 | End: 2025-05-27 | Stop reason: HOSPADM

## 2025-05-26 RX ORDER — PREDNISONE 20 MG/1
40 TABLET ORAL DAILY
Status: COMPLETED | OUTPATIENT
Start: 2025-05-26 | End: 2025-05-27

## 2025-05-26 RX ADMIN — INSULIN GLARGINE 20 UNITS: 100 INJECTION, SOLUTION SUBCUTANEOUS at 08:17

## 2025-05-26 RX ADMIN — ASPIRIN 81 MG: 81 TABLET, COATED ORAL at 17:06

## 2025-05-26 RX ADMIN — SODIUM CHLORIDE, PRESERVATIVE FREE 10 ML: 5 INJECTION INTRAVENOUS at 20:00

## 2025-05-26 RX ADMIN — GUAIFENESIN 400 MG: 400 TABLET ORAL at 20:00

## 2025-05-26 RX ADMIN — INSULIN LISPRO 7 UNITS: 100 INJECTION, SOLUTION INTRAVENOUS; SUBCUTANEOUS at 08:18

## 2025-05-26 RX ADMIN — INSULIN LISPRO 7 UNITS: 100 INJECTION, SOLUTION INTRAVENOUS; SUBCUTANEOUS at 10:06

## 2025-05-26 RX ADMIN — DOCUSATE SODIUM 50 MG AND SENNOSIDES 8.6 MG 2 TABLET: 8.6; 5 TABLET, FILM COATED ORAL at 12:36

## 2025-05-26 RX ADMIN — DOXYCYCLINE 100 MG: 100 INJECTION, POWDER, LYOPHILIZED, FOR SOLUTION INTRAVENOUS at 17:06

## 2025-05-26 RX ADMIN — INSULIN LISPRO 4 UNITS: 100 INJECTION, SOLUTION INTRAVENOUS; SUBCUTANEOUS at 10:06

## 2025-05-26 RX ADMIN — FOLIC ACID 1 MG: 1 TABLET ORAL at 08:12

## 2025-05-26 RX ADMIN — CALCIUM POLYCARBOPHIL 625 MG: 625 TABLET, FILM COATED ORAL at 08:12

## 2025-05-26 RX ADMIN — ANORECTAL OINTMENT: 15.7; .44; 24; 20.6 OINTMENT TOPICAL at 20:01

## 2025-05-26 RX ADMIN — GUAIFENESIN 400 MG: 400 TABLET ORAL at 17:06

## 2025-05-26 RX ADMIN — INSULIN LISPRO 7 UNITS: 100 INJECTION, SOLUTION INTRAVENOUS; SUBCUTANEOUS at 16:27

## 2025-05-26 RX ADMIN — TAMSULOSIN HYDROCHLORIDE 0.4 MG: 0.4 CAPSULE ORAL at 08:12

## 2025-05-26 RX ADMIN — CEFEPIME 2000 MG: 2 INJECTION, POWDER, FOR SOLUTION INTRAVENOUS at 05:32

## 2025-05-26 RX ADMIN — WARFARIN SODIUM 1 MG: 1 TABLET ORAL at 17:06

## 2025-05-26 RX ADMIN — MICONAZOLE NITRATE 1 APPLICATION: 20 POWDER TOPICAL at 20:00

## 2025-05-26 RX ADMIN — PANTOPRAZOLE SODIUM 40 MG: 40 TABLET, DELAYED RELEASE ORAL at 08:12

## 2025-05-26 RX ADMIN — MICONAZOLE NITRATE 1 APPLICATION: 20 POWDER TOPICAL at 08:15

## 2025-05-26 RX ADMIN — ASPIRIN 81 MG: 81 TABLET, COATED ORAL at 08:12

## 2025-05-26 RX ADMIN — GUAIFENESIN 400 MG: 400 TABLET ORAL at 12:36

## 2025-05-26 RX ADMIN — PREDNISONE 40 MG: 20 TABLET ORAL at 08:11

## 2025-05-26 RX ADMIN — INSULIN LISPRO 16 UNITS: 100 INJECTION, SOLUTION INTRAVENOUS; SUBCUTANEOUS at 19:59

## 2025-05-26 RX ADMIN — CEFEPIME 2000 MG: 2 INJECTION, POWDER, FOR SOLUTION INTRAVENOUS at 17:02

## 2025-05-26 RX ADMIN — DOXYCYCLINE 100 MG: 100 INJECTION, POWDER, LYOPHILIZED, FOR SOLUTION INTRAVENOUS at 05:35

## 2025-05-26 RX ADMIN — DOCUSATE SODIUM 50 MG AND SENNOSIDES 8.6 MG 2 TABLET: 8.6; 5 TABLET, FILM COATED ORAL at 20:00

## 2025-05-26 RX ADMIN — FERROUS SULFATE TAB 325 MG (65 MG ELEMENTAL FE) 325 MG: 325 (65 FE) TAB at 08:12

## 2025-05-26 RX ADMIN — BUMETANIDE 2 MG: 1 TABLET ORAL at 08:11

## 2025-05-26 RX ADMIN — GUAIFENESIN 400 MG: 400 TABLET ORAL at 08:12

## 2025-05-26 ASSESSMENT — PAIN SCALES - GENERAL: PAINLEVEL_OUTOF10: 0

## 2025-05-26 NOTE — PROGRESS NOTES
[facility] Hospitalist Progress Note    Admitting Date and Time: 5/20/2025 10:38 PM  Admit Dx: Hyperkalemia [E87.5]  Urinary retention [R33.9]  REYNOLD (acute kidney injury) [N17.9]  Acute respiratory failure with hypoxia and hypercapnia (HCC) [J96.01, J96.02]  Wound of foot [S91.309A]    Subjective:  Patient is being followed for Hyperkalemia [E87.5]  Urinary retention [R33.9]  REYNOLD (acute kidney injury) [N17.9]  Acute respiratory failure with hypoxia and hypercapnia (HCC) [J96.01, J96.02]  Wound of foot [S91.309A]   Pt was seen and examined today.  Denies any new issues.    ROS: denies fever, chills, cp, sob, n/v, HA unless stated above.     predniSONE  40 mg Oral Daily    Followed by    [START ON 5/28/2025] predniSONE  30 mg Oral Daily    Followed by    [START ON 5/31/2025] predniSONE  20 mg Oral Daily    Followed by    [START ON 6/3/2025] predniSONE  10 mg Oral Daily    warfarin  1 mg Oral Once    senna-docusate  2 tablet Oral BID    cefepime  2,000 mg IntraVENous Q12H    menthol-zinc oxide   Topical BID    guaiFENesin  400 mg Oral 4x daily    insulin lispro  7 Units SubCUTAneous TID WC    insulin lispro  0-16 Units SubCUTAneous 4x Daily AC & HS    insulin glargine  20 Units SubCUTAneous Daily    polycarbophil  625 mg Oral Daily    bumetanide  2 mg Oral Daily    pantoprazole  40 mg Oral Daily    pantoprazole  20 mg Oral Once    budesonide  0.5 mg Nebulization BID RT    arformoterol tartrate  15 mcg Nebulization BID RT    sodium chloride flush  5-40 mL IntraVENous 2 times per day    doxycycline (VIBRAMYCIN) IV  100 mg IntraVENous Q12H    aspirin  81 mg Oral BID    [Held by provider] metoprolol tartrate  12.5 mg Oral BID    miconazole  1 Application Topical BID    tamsulosin  0.4 mg Oral Daily    ferrous sulfate  325 mg Oral Daily with breakfast    folic acid  1 mg Oral Daily    ipratropium 0.5 mg-albuterol 2.5 mg  1 Dose Inhalation Q4H WA RT    warfarin placeholder: dosing by pharmacy   Oral RX Placeholder

## 2025-05-26 NOTE — PLAN OF CARE
Problem: Chronic Conditions and Co-morbidities  Goal: Patient's chronic conditions and co-morbidity symptoms are monitored and maintained or improved  5/26/2025 1256 by Dulce Maria Mejia RN  Outcome: Progressing     Problem: Discharge Planning  Goal: Discharge to home or other facility with appropriate resources  5/26/2025 1256 by Dulce Maria Mejia RN  Outcome: Progressing     Problem: Safety - Adult  Goal: Free from fall injury  5/26/2025 1256 by Dulce Maria Mejia RN  Outcome: Progressing     Problem: ABCDS Injury Assessment  Goal: Absence of physical injury  5/26/2025 1256 by Dulce Maria Mejia RN  Outcome: Progressing     Problem: Skin/Tissue Integrity  Goal: Skin integrity remains intact  5/26/2025 1256 by Dulce Maria Mejia RN  Outcome: Progressing     Problem: Pain  Goal: Verbalizes/displays adequate comfort level or baseline comfort level  5/26/2025 1256 by Dulce Maria Mejia RN  Outcome: Progressing     Problem: Nutrition Deficit:  Goal: Optimize nutritional status  5/26/2025 1256 by Dulce Maria Mejia RN  Outcome: Progressing

## 2025-05-26 NOTE — PLAN OF CARE
Problem: Chronic Conditions and Co-morbidities  Goal: Patient's chronic conditions and co-morbidity symptoms are monitored and maintained or improved  Outcome: Progressing     Problem: Discharge Planning  Goal: Discharge to home or other facility with appropriate resources  Outcome: Progressing     Problem: Safety - Adult  Goal: Free from fall injury  Outcome: Progressing     Problem: ABCDS Injury Assessment  Goal: Absence of physical injury  Outcome: Progressing     Problem: Skin/Tissue Integrity  Goal: Skin integrity remains intact  Description: 1.  Monitor for areas of redness and/or skin breakdown2.  Assess vascular access sites hourly3.  Every 4-6 hours minimum:  Change oxygen saturation probe site4.  Every 4-6 hours:  If on nasal continuous positive airway pressure, respiratory therapy assess nares and determine need for appliance change or resting period  Outcome: Progressing     Problem: Pain  Goal: Verbalizes/displays adequate comfort level or baseline comfort level  Outcome: Progressing     Problem: Nutrition Deficit:  Goal: Optimize nutritional status  Outcome: Progressing

## 2025-05-26 NOTE — PROGRESS NOTES
Pharmacy Consultation Note  (Warfarin Dosing and Monitoring)    Initial consult date: 5/21/2025  Consulting Provider: Brinda López, JAMIE-JING    Daniel Mcdonald is a 69 y.o. male for whom pharmacy has been asked to manage warfarin therapy.     Weight:   Wt Readings from Last 1 Encounters:   05/26/25 (!) 174 kg (383 lb 9.6 oz)       TSH:    Lab Results   Component Value Date/Time    TSH 1.19 05/20/2025 11:57 PM       Hepatic Function Panel:                            Lab Results   Component Value Date/Time    ALKPHOS 78 05/20/2025 11:57 PM    ALT 8 05/20/2025 11:57 PM    AST 17 05/20/2025 11:57 PM    BILITOT 0.4 05/20/2025 11:57 PM    BILIDIR 0.1 04/03/2025 05:20 AM       Current significant warfarin drug-drug interactions include:   -prednisone, doxycycline: may increase anticoagulant effects of warfarin.     Recent Labs     05/24/25  0316 05/26/25  0920   HGB 9.9* 10.8*     Date Warfarin Dose INR Heparin or LMWH Comment   5/21 1 mg 3.2  (5/20) --    5/22 NO DOSE 5.6 --    5/23 NO DOSE 5.6 --    5/24 NO DOSE 4.4 --    5/25 NO DOSE 3.3 --    5/26 1 mg 2.3 --                    Assessment:  Patient is a 69 y.o. male on warfarin for History of  VTE/PE.  Patient's home warfarin dosing regimen is documented as warfarin 3 mg daily per SNF documentation.   Goal INR 2-3  Prednisone taper set to end 6/6. Patient will need close INR monitoring as steroids will lead to increased in INR.    Plan:  Will resume warfarin with a low dose of 1 mg tonight now that INR is within therapeutic range.   Daily PT/INR until the INR is stable within the therapeutic range  Pharmacist will follow and monitor/adjust dosing as necessary    Electronically signed by Shara Macedo RPH, PharmD, BCPS 5/26/2025 10:38 AM   Ext: [id number]    TIMMY: [phone]  SEY: [phone]  SJW: [phone]

## 2025-05-26 NOTE — PROGRESS NOTES
Oral Daily    ferrous sulfate  325 mg Oral Daily with breakfast    folic acid  1 mg Oral Daily    ipratropium 0.5 mg-albuterol 2.5 mg  1 Dose Inhalation Q4H WA RT    warfarin placeholder: dosing by pharmacy   Oral RX Placeholder     Continuous Infusions:   sodium chloride      dextrose       PRN Meds:.fluticasone, docusate sodium, lactulose, loperamide, sodium chloride flush, sodium chloride, ondansetron **OR** ondansetron, polyethylene glycol, acetaminophen **OR** acetaminophen, glucose, dextrose bolus **OR** dextrose bolus, glucagon (rDNA), dextrose, melatonin      DATA:    CBC:   Lab Results   Component Value Date/Time    WBC 8.5 05/26/2025 09:20 AM    RBC 5.44 05/26/2025 09:20 AM    HGB 10.8 05/26/2025 09:20 AM    HCT 39.0 05/26/2025 09:20 AM    MCV 71.7 05/26/2025 09:20 AM    MCH 19.9 05/26/2025 09:20 AM    MCHC 27.7 05/26/2025 09:20 AM    RDW 23.7 05/26/2025 09:20 AM     05/19/2025 06:25 AM    MPV Can not be calculated 05/26/2025 09:20 AM     CMP:    Lab Results   Component Value Date/Time     05/26/2025 09:20 AM    K 4.2 05/26/2025 09:20 AM     05/26/2025 09:20 AM    CO2 30 05/26/2025 09:20 AM    BUN 70 05/26/2025 09:20 AM    CREATININE 1.9 05/26/2025 09:20 AM    GFRAA > 60 05/19/2025 06:25 AM    LABGLOM 38 05/26/2025 09:20 AM    LABGLOM 55 09/06/2023 12:00 PM    GLUCOSE 227 05/26/2025 09:20 AM    GLUCOSE 133 05/19/2025 06:25 AM    CALCIUM 8.4 05/26/2025 09:20 AM    BILITOT 0.4 05/20/2025 11:57 PM    ALKPHOS 78 05/20/2025 11:57 PM    AST 17 05/20/2025 11:57 PM    ALT 8 05/20/2025 11:57 PM     Magnesium:    Lab Results   Component Value Date/Time    MG 2.2 05/20/2025 11:57 PM     Phosphorus:  No results found for: \"PHOS\"    Radiology Review:      CT chest without IV contrast 5/21/2025    IMPRESSION:  1. Interval development of some increased markings identified within the left lung base concerning for infiltrate such as possible aspiration.  2. Cholelithiasis.  3. Stable heterogeneous

## 2025-05-26 NOTE — PROGRESS NOTES
Pulmonary Progress Note    Admit Date: 2025  Hospital day                               PCP: Geoff Kauffman DO    Chief Complaint (s):  Patient Active Problem List   Diagnosis    Morbid obesity with BMI of 60.0-69.9, adult (HCC)    SUKHWINDER (obstructive sleep apnea)    Diabetes mellitus type 2, uncontrolled    Coronary artery disease involving native coronary artery without angina pectoris    Hyperlipidemia LDL goal <100    Essential hypertension    Acute kidney injury    Acute diastolic heart failure (HCC)    Pulmonary embolism, bilateral (HCC)    Neuropathy of right sciatic nerve    Generalized weakness    Physical deconditioning    Displaced fracture of proximal end of humerus    Ankle fracture, left, open type III, initial encounter    Anemia    Drug-induced constipation    BRBPR (bright red blood per rectum)    Acute hypoxic respiratory failure (HCC)    Congestive heart failure (HCC)    Acute respiratory failure with hypoxia and hypercapnia (HCC)    Hyperkalemia    Rhinovirus infection    REYNOLD (acute kidney injury)    Diarrhea of presumed infectious origin       Subjective:  Sleeping soundly this p.m.  No signs of respiratory distress.  No observed apnea.    Vitals:  VITALS:  /70   Pulse 80   Temp 98.1 °F (36.7 °C) (Oral)   Resp 18   Ht 1.778 m (5' 10\")   Wt (!) 174 kg (383 lb 9.6 oz)   SpO2 92%   BMI 55.04 kg/m²     24HR INTAKE/OUTPUT:      Intake/Output Summary (Last 24 hours) at 2025 1444  Last data filed at 2025 0817  Gross per 24 hour   Intake 120 ml   Output 1850 ml   Net -1730 ml       24HR PULSE OXIMETRY RANGE:    SpO2  Av %  Min: 92 %  Max: 98 %    Medications:  IV:   sodium chloride      dextrose         Scheduled Meds:   predniSONE  40 mg Oral Daily    Followed by    [START ON 2025] predniSONE  30 mg Oral Daily    Followed by    [START ON 2025] predniSONE  20 mg Oral Daily    Followed by    [START ON 6/3/2025] predniSONE  10 mg Oral Daily  old records/nurses notes/vital signs

## 2025-05-26 NOTE — PROGRESS NOTES
Patient refusing skin check along with Left foot dressing, this nurse educated on the importance of both and patient still refused.

## 2025-05-27 VITALS
OXYGEN SATURATION: 92 % | WEIGHT: 315 LBS | RESPIRATION RATE: 20 BRPM | SYSTOLIC BLOOD PRESSURE: 127 MMHG | TEMPERATURE: 98.1 F | BODY MASS INDEX: 45.1 KG/M2 | DIASTOLIC BLOOD PRESSURE: 70 MMHG | HEART RATE: 80 BPM | HEIGHT: 70 IN

## 2025-05-27 LAB
ANION GAP SERPL CALCULATED.3IONS-SCNC: 9 MMOL/L (ref 7–16)
BNP SERPL-MCNC: 1838 PG/ML (ref 0–125)
BUN SERPL-MCNC: 71 MG/DL (ref 6–23)
CALCIUM SERPL-MCNC: 8.5 MG/DL (ref 8.6–10.2)
CHLORIDE SERPL-SCNC: 102 MMOL/L (ref 98–107)
CO2 SERPL-SCNC: 29 MMOL/L (ref 22–29)
CREAT SERPL-MCNC: 1.9 MG/DL (ref 0.7–1.2)
GFR, ESTIMATED: 37 ML/MIN/1.73M2
GLUCOSE BLD-MCNC: 184 MG/DL (ref 74–99)
GLUCOSE BLD-MCNC: 184 MG/DL (ref 74–99)
GLUCOSE BLD-MCNC: 192 MG/DL (ref 74–99)
GLUCOSE SERPL-MCNC: 204 MG/DL (ref 74–99)
INR PPP: 2.2
POTASSIUM SERPL-SCNC: 4.4 MMOL/L (ref 3.5–5)
PROTHROMBIN TIME: 23.5 SEC (ref 9.3–12.4)
SODIUM SERPL-SCNC: 140 MMOL/L (ref 132–146)

## 2025-05-27 PROCEDURE — 2700000000 HC OXYGEN THERAPY PER DAY

## 2025-05-27 PROCEDURE — 2580000003 HC RX 258: Performed by: STUDENT IN AN ORGANIZED HEALTH CARE EDUCATION/TRAINING PROGRAM

## 2025-05-27 PROCEDURE — 6370000000 HC RX 637 (ALT 250 FOR IP)

## 2025-05-27 PROCEDURE — 85610 PROTHROMBIN TIME: CPT

## 2025-05-27 PROCEDURE — 6370000000 HC RX 637 (ALT 250 FOR IP): Performed by: INTERNAL MEDICINE

## 2025-05-27 PROCEDURE — 94660 CPAP INITIATION&MGMT: CPT

## 2025-05-27 PROCEDURE — 6370000000 HC RX 637 (ALT 250 FOR IP): Performed by: STUDENT IN AN ORGANIZED HEALTH CARE EDUCATION/TRAINING PROGRAM

## 2025-05-27 PROCEDURE — 2500000003 HC RX 250 WO HCPCS

## 2025-05-27 PROCEDURE — 82962 GLUCOSE BLOOD TEST: CPT

## 2025-05-27 PROCEDURE — 2580000003 HC RX 258

## 2025-05-27 PROCEDURE — 6360000002 HC RX W HCPCS

## 2025-05-27 PROCEDURE — 6370000000 HC RX 637 (ALT 250 FOR IP): Performed by: NURSE PRACTITIONER

## 2025-05-27 PROCEDURE — 80048 BASIC METABOLIC PNL TOTAL CA: CPT

## 2025-05-27 PROCEDURE — 99239 HOSP IP/OBS DSCHRG MGMT >30: CPT | Performed by: STUDENT IN AN ORGANIZED HEALTH CARE EDUCATION/TRAINING PROGRAM

## 2025-05-27 PROCEDURE — 83880 ASSAY OF NATRIURETIC PEPTIDE: CPT

## 2025-05-27 PROCEDURE — 51798 US URINE CAPACITY MEASURE: CPT

## 2025-05-27 PROCEDURE — 6360000002 HC RX W HCPCS: Performed by: STUDENT IN AN ORGANIZED HEALTH CARE EDUCATION/TRAINING PROGRAM

## 2025-05-27 RX ORDER — BUMETANIDE 2 MG/1
2 TABLET ORAL DAILY
DISCHARGE
Start: 2025-05-28

## 2025-05-27 RX ORDER — PANTOPRAZOLE SODIUM 40 MG/1
40 TABLET, DELAYED RELEASE ORAL DAILY
DISCHARGE
Start: 2025-05-28

## 2025-05-27 RX ORDER — POLYETHYLENE GLYCOL 3350 17 G/17G
17 POWDER, FOR SOLUTION ORAL 2 TIMES DAILY
Status: DISCONTINUED | OUTPATIENT
Start: 2025-05-27 | End: 2025-05-27 | Stop reason: HOSPADM

## 2025-05-27 RX ORDER — DOXYCYCLINE 100 MG/1
100 CAPSULE ORAL EVERY 12 HOURS SCHEDULED
Status: DISCONTINUED | OUTPATIENT
Start: 2025-05-27 | End: 2025-05-27 | Stop reason: HOSPADM

## 2025-05-27 RX ORDER — ASPIRIN 81 MG/1
81 TABLET ORAL DAILY
DISCHARGE
Start: 2025-05-27

## 2025-05-27 RX ORDER — WARFARIN SODIUM 1 MG/1
1 TABLET ORAL
Status: DISCONTINUED | OUTPATIENT
Start: 2025-05-27 | End: 2025-05-27 | Stop reason: HOSPADM

## 2025-05-27 RX ORDER — INSULIN GLARGINE 100 [IU]/ML
20 INJECTION, SOLUTION SUBCUTANEOUS DAILY
DISCHARGE
Start: 2025-05-28

## 2025-05-27 RX ORDER — FERROUS SULFATE 325(65) MG
325 TABLET ORAL EVERY OTHER DAY
DISCHARGE
Start: 2025-05-27

## 2025-05-27 RX ORDER — LEVOFLOXACIN 750 MG/1
750 TABLET, FILM COATED ORAL DAILY
DISCHARGE
Start: 2025-05-28 | End: 2025-05-30

## 2025-05-27 RX ADMIN — FOLIC ACID 1 MG: 1 TABLET ORAL at 08:49

## 2025-05-27 RX ADMIN — DOCUSATE SODIUM 50 MG AND SENNOSIDES 8.6 MG 2 TABLET: 8.6; 5 TABLET, FILM COATED ORAL at 08:48

## 2025-05-27 RX ADMIN — FERROUS SULFATE TAB 325 MG (65 MG ELEMENTAL FE) 325 MG: 325 (65 FE) TAB at 08:48

## 2025-05-27 RX ADMIN — ASPIRIN 81 MG: 81 TABLET, COATED ORAL at 08:48

## 2025-05-27 RX ADMIN — INSULIN LISPRO 7 UNITS: 100 INJECTION, SOLUTION INTRAVENOUS; SUBCUTANEOUS at 11:43

## 2025-05-27 RX ADMIN — INSULIN LISPRO 4 UNITS: 100 INJECTION, SOLUTION INTRAVENOUS; SUBCUTANEOUS at 11:43

## 2025-05-27 RX ADMIN — DOXYCYCLINE 100 MG: 100 INJECTION, POWDER, LYOPHILIZED, FOR SOLUTION INTRAVENOUS at 05:07

## 2025-05-27 RX ADMIN — CEFEPIME 2000 MG: 2 INJECTION, POWDER, FOR SOLUTION INTRAVENOUS at 05:04

## 2025-05-27 RX ADMIN — PANTOPRAZOLE SODIUM 40 MG: 40 TABLET, DELAYED RELEASE ORAL at 08:47

## 2025-05-27 RX ADMIN — MICONAZOLE NITRATE 1 APPLICATION: 20 POWDER TOPICAL at 09:03

## 2025-05-27 RX ADMIN — INSULIN LISPRO 7 UNITS: 100 INJECTION, SOLUTION INTRAVENOUS; SUBCUTANEOUS at 06:25

## 2025-05-27 RX ADMIN — INSULIN LISPRO 7 UNITS: 100 INJECTION, SOLUTION INTRAVENOUS; SUBCUTANEOUS at 16:26

## 2025-05-27 RX ADMIN — INSULIN LISPRO 4 UNITS: 100 INJECTION, SOLUTION INTRAVENOUS; SUBCUTANEOUS at 06:25

## 2025-05-27 RX ADMIN — ANORECTAL OINTMENT: 15.7; .44; 24; 20.6 OINTMENT TOPICAL at 08:58

## 2025-05-27 RX ADMIN — SODIUM CHLORIDE, PRESERVATIVE FREE 10 ML: 5 INJECTION INTRAVENOUS at 09:04

## 2025-05-27 RX ADMIN — INSULIN GLARGINE 20 UNITS: 100 INJECTION, SOLUTION SUBCUTANEOUS at 09:08

## 2025-05-27 RX ADMIN — GUAIFENESIN 400 MG: 400 TABLET ORAL at 11:42

## 2025-05-27 RX ADMIN — CALCIUM POLYCARBOPHIL 625 MG: 625 TABLET, FILM COATED ORAL at 08:47

## 2025-05-27 RX ADMIN — BUMETANIDE 2 MG: 1 TABLET ORAL at 08:47

## 2025-05-27 RX ADMIN — POLYETHYLENE GLYCOL 3350 17 G: 17 POWDER, FOR SOLUTION ORAL at 08:46

## 2025-05-27 RX ADMIN — INSULIN LISPRO 4 UNITS: 100 INJECTION, SOLUTION INTRAVENOUS; SUBCUTANEOUS at 16:25

## 2025-05-27 RX ADMIN — GUAIFENESIN 400 MG: 400 TABLET ORAL at 08:48

## 2025-05-27 RX ADMIN — TAMSULOSIN HYDROCHLORIDE 0.4 MG: 0.4 CAPSULE ORAL at 08:48

## 2025-05-27 RX ADMIN — PREDNISONE 40 MG: 20 TABLET ORAL at 08:49

## 2025-05-27 NOTE — PROGRESS NOTES
Call placed to [facility] to provide nurse to nurse x 2 unsuccessful.  Answer has dropped/hung up on this nurse with each call placed.

## 2025-05-27 NOTE — PROGRESS NOTES
Pulmonary Progress Note    Admit Date: 2025  Hospital day                               PCP: Geoff Kauffman DO    Chief Complaint (s):  Patient Active Problem List   Diagnosis    Morbid obesity with BMI of 60.0-69.9, adult (HCC)    SUKHWINDER (obstructive sleep apnea)    Diabetes mellitus type 2, uncontrolled    Coronary artery disease involving native coronary artery without angina pectoris    Hyperlipidemia LDL goal <100    Essential hypertension    Acute kidney injury    Acute diastolic heart failure (HCC)    Pulmonary embolism, bilateral (HCC)    Neuropathy of right sciatic nerve    Generalized weakness    Physical deconditioning    Displaced fracture of proximal end of humerus    Ankle fracture, left, open type III, initial encounter    Anemia    Drug-induced constipation    BRBPR (bright red blood per rectum)    Acute hypoxic respiratory failure (HCC)    Congestive heart failure (HCC)    Acute respiratory failure with hypoxia and hypercapnia (HCC)    Hyperkalemia    Rhinovirus infection    REYNOLD (acute kidney injury)    Diarrhea of presumed infectious origin       Subjective:  Patient is seen on 1 L nasal cannula. He feels okay. Still has some congestion.           Vitals:  VITALS:  /71   Pulse 75   Temp 97.4 °F (36.3 °C) (Oral)   Resp 20   Ht 1.778 m (5' 10\")   Wt (!) 170.3 kg (375 lb 6.4 oz)   SpO2 93%   BMI 53.86 kg/m²     24HR INTAKE/OUTPUT:      Intake/Output Summary (Last 24 hours) at 2025 1113  Last data filed at 2025 0930  Gross per 24 hour   Intake 780 ml   Output 2000 ml   Net -1220 ml       24HR PULSE OXIMETRY RANGE:    SpO2  Av.7 %  Min: 91 %  Max: 98 %    Medications:  IV:   sodium chloride      dextrose         Scheduled Meds:   doxycycline  100 mg Oral 2 times per day    polyethylene glycol  17 g Oral BID    warfarin  1 mg Oral Once    [START ON 2025] predniSONE  30 mg Oral Daily    Followed by    [START ON 2025] predniSONE  20 mg Oral Daily

## 2025-05-27 NOTE — PLAN OF CARE
Problem: Chronic Conditions and Co-morbidities  Goal: Patient's chronic conditions and co-morbidity symptoms are monitored and maintained or improved  5/26/2025 2320 by Liv Carreon RN  Outcome: Progressing     Problem: Discharge Planning  Goal: Discharge to home or other facility with appropriate resources  5/26/2025 2320 by Liv Carreon RN  Outcome: Progressing     Problem: Safety - Adult  Goal: Free from fall injury  5/26/2025 2320 by Liv Carreon RN  Outcome: Progressing     Problem: ABCDS Injury Assessment  Goal: Absence of physical injury  5/26/2025 2320 by Liv Carreon RN  Outcome: Progressing     Problem: Skin/Tissue Integrity  Goal: Skin integrity remains intact  Description: 1.  Monitor for areas of redness and/or skin breakdown2.  Assess vascular access sites hourly3.  Every 4-6 hours minimum:  Change oxygen saturation probe site4.  Every 4-6 hours:  If on nasal continuous positive airway pressure, respiratory therapy assess nares and determine need for appliance change or resting period  5/26/2025 2320 by Liv Carreon RN  Outcome: Progressing     Problem: Pain  Goal: Verbalizes/displays adequate comfort level or baseline comfort level  5/26/2025 2320 by Liv Carreon RN  Outcome: Progressing     Problem: Nutrition Deficit:  Goal: Optimize nutritional status  5/26/2025 2320 by Liv Carreon RN  Outcome: Progressing

## 2025-05-27 NOTE — PROGRESS NOTES
[facility] Hospitalist Progress Note    Admitting Date and Time: 5/20/2025 10:38 PM  Admit Dx: Hyperkalemia [E87.5]  Urinary retention [R33.9]  ERYNOLD (acute kidney injury) [N17.9]  Acute respiratory failure with hypoxia and hypercapnia (HCC) [J96.01, J96.02]  Wound of foot [S91.309A]    Subjective:  Patient is being followed for Hyperkalemia [E87.5]  Urinary retention [R33.9]  REYNOLD (acute kidney injury) [N17.9]  Acute respiratory failure with hypoxia and hypercapnia (HCC) [J96.01, J96.02]  Wound of foot [S91.309A]   Pt was seen and examined today.  Denies any new issues.    ROS: denies fever, chills, cp, sob, n/v, HA unless stated above.     doxycycline  100 mg Oral 2 times per day    polyethylene glycol  17 g Oral BID    [START ON 5/28/2025] predniSONE  30 mg Oral Daily    Followed by    [START ON 5/31/2025] predniSONE  20 mg Oral Daily    Followed by    [START ON 6/3/2025] predniSONE  10 mg Oral Daily    sennosides-docusate sodium  2 tablet Oral BID    cefepime  2,000 mg IntraVENous Q12H    menthol-zinc oxide   Topical BID    guaiFENesin  400 mg Oral 4x daily    insulin lispro  7 Units SubCUTAneous TID WC    insulin lispro  0-16 Units SubCUTAneous 4x Daily AC & HS    insulin glargine  20 Units SubCUTAneous Daily    polycarbophil  625 mg Oral Daily    bumetanide  2 mg Oral Daily    pantoprazole  40 mg Oral Daily    budesonide  0.5 mg Nebulization BID RT    arformoterol tartrate  15 mcg Nebulization BID RT    sodium chloride flush  5-40 mL IntraVENous 2 times per day    aspirin  81 mg Oral BID    [Held by provider] metoprolol tartrate  12.5 mg Oral BID    miconazole  1 Application Topical BID    tamsulosin  0.4 mg Oral Daily    ferrous sulfate  325 mg Oral Daily with breakfast    folic acid  1 mg Oral Daily    ipratropium 0.5 mg-albuterol 2.5 mg  1 Dose Inhalation Q4H WA RT    warfarin placeholder: dosing by pharmacy   Oral RX Placeholder     fluticasone, 2 spray, Daily PRN  lactulose, 20 g, Daily PRN  sodium

## 2025-05-27 NOTE — DISCHARGE SUMMARY
[facility] Hospitalist Physician Discharge Summary       [address]  [phone]          Activity level: As tolerated     Dispo: SNF      Condition on discharge: Stable     Patient ID:  Daniel Mcdonald  [id number]  69 y.o.  1955    Admit date: 5/20/2025    Discharge date and time:  5/27/2025  2:43 PM    Admission Diagnoses: Principal Problem:    Acute respiratory failure with hypoxia and hypercapnia (HCC)  Active Problems:    Hyperkalemia    Rhinovirus infection    REYNOLD (acute kidney injury)    Diarrhea of presumed infectious origin  Resolved Problems:    * No resolved hospital problems. *      Discharge Diagnoses: Principal Problem:    Acute respiratory failure with hypoxia and hypercapnia (HCC)  Active Problems:    Hyperkalemia    Rhinovirus infection    REYNOLD (acute kidney injury)    Diarrhea of presumed infectious origin  Resolved Problems:    * No resolved hospital problems. *      Consults:  IP CONSULT TO PULMONOLOGY  IP CONSULT TO INTERNAL MEDICINE  PHARMACY TO DOSE WARFARIN  IP CONSULT TO NEPHROLOGY  IP CONSULT TO VASCULAR ACCESS TEAM  IP CONSULT TO VASCULAR ACCESS TEAM  IP CONSULT TO VASCULAR ACCESS TEAM  IP CONSULT TO DIETITIAN  IP CONSULT TO VASCULAR ACCESS TEAM    Procedures:     Hospital Course:   Patient Daniel Mcdonald is a 69 y.o. with PMHx HTN, HLD, DM, CAD, PE on coumadin, CHF who presented with Hyperkalemia [E87.5]  Urinary retention [R33.9]  REYNOLD (acute kidney injury) [N17.9]  Acute respiratory failure with hypoxia and hypercapnia (HCC) [J96.01, J96.02]  Wound of foot [S91.309A]  Patient presented with shortness of breath and fatigue.  He comes from [facility] where he was rehabbing from a trimalleolar fracture of the left ankle.  On the day of presentation his SpO2 was in the 60s and patient was lethargic, he necessitated supplemental oxygen while here but was weaned to only 1 L on the day of discharge.  He was found to have  solution  Commonly known as: PROVENTIL     bisacodyl 10 MG suppository  Commonly known as: DULCOLAX     Claritin 10 MG tablet  Generic drug: loratadine     docusate sodium 100 MG capsule  Commonly known as: COLACE     fluticasone 50 MCG/ACT nasal spray  Commonly known as: FLONASE     folic acid 1 MG tablet  Commonly known as: FOLVITE     glipiZIDE 5 MG tablet  Commonly known as: Glucotrol  Take 1 tablet by mouth daily     guaiFENesin 600 MG extended release tablet  Commonly known as: MUCINEX     hydrocortisone 2.5 % cream     lactulose 10 GM/15ML solution  Commonly known as: CHRONULAC     loperamide 2 MG capsule  Commonly known as: IMODIUM     magnesium hydroxide 400 MG/5ML suspension  Commonly known as: MILK OF MAGNESIA     metoprolol tartrate 25 MG tablet  Commonly known as: LOPRESSOR  Take 0.5 tablets by mouth 2 times daily     ondansetron 4 MG tablet  Commonly known as: ZOFRAN     polyethylene glycol 17 g packet  Commonly known as: GLYCOLAX     tamsulosin 0.4 MG capsule  Commonly known as: FLOMAX            STOP taking these medications      ammonium lactate 12 % lotion  Commonly known as: LAC-HYDRIN     furosemide 20 MG tablet  Commonly known as: LASIX     lactobacillus capsule     melatonin 10 MG Caps capsule     miconazole 2 % powder  Commonly known as: MICOTIN     OXYGEN               Where to Get Your Medications        Information about where to get these medications is not yet available    Ask your nurse or doctor about these medications  aspirin 81 MG EC tablet  bumetanide 2 MG tablet  ferrous sulfate 325 (65 Fe) MG tablet  insulin glargine 100 UNIT/ML injection vial  levoFLOXacin 750 MG tablet  pantoprazole 40 MG tablet  polycarbophil 625 MG tablet           Note that 35 minutes were spent in preparing discharge papers, discussing discharge with patient, medication review, etc.    Signed:  Electronically signed by Yoan Cotter MD on 5/27/2025 at 2:43 PM

## 2025-05-27 NOTE — PROGRESS NOTES
Spiritual Health History and Assessment/Progress Note  [facility]     Encounter, Rituals, Rites and Sacraments,  ,  ,      Name: Daniel Mcdonald MRN: [id number]    Age: 69 y.o.     Sex: male   Language: English   Confucianist: Restorationist   Acute respiratory failure with hypoxia and hypercapnia (HCC)     Date: 5/27/2025                           Spiritual Assessment began in [facility] 4S INTERMEDIATE 1        Referral/Consult From: Rounding   Encounter Overview/Reason:  Encounter, Rituals, Rites and Sacraments  Service Provided For: Patient    Jessie, Belief, Meaning:   Patient is connected with a jessie tradition or spiritual practice  Family/Friends No family/friends present      Importance and Influence:  Patient has no beliefs influential to healthcare decision-making identified during this visit  Family/Friends No family/friends present    Community:  Patient feels well-supported. Support system includes: Friends  Family/Friends No family/friends present    Assessment and Plan of Care:     Patient Interventions include: Facilitated expression of thoughts and feelings, Affirmed coping skills/support systems, and Provided sacramental/Yazidi ritual  Family/Friends Interventions include: No family/friends present    Patient Plan of Care: Spiritual Care available upon further referral  Family/Friends Plan of Care: Spiritual Care available upon further referral    Electronically signed by Chaplain Caterina on 5/27/2025 at 7:58 PM

## 2025-05-27 NOTE — PROGRESS NOTES
Pharmacy Consultation Note  (Warfarin Dosing and Monitoring)    Initial consult date: 5/21/2025  Consulting Provider: Brinda López, APRN-CNP    Daniel Mcdonald is a 69 y.o. male for whom pharmacy has been asked to manage warfarin therapy.     Weight:   Wt Readings from Last 1 Encounters:   05/27/25 (!) 170.3 kg (375 lb 6.4 oz)       TSH:    Lab Results   Component Value Date/Time    TSH 1.19 05/20/2025 11:57 PM       Hepatic Function Panel:                            Lab Results   Component Value Date/Time    ALKPHOS 78 05/20/2025 11:57 PM    ALT 8 05/20/2025 11:57 PM    AST 17 05/20/2025 11:57 PM    BILITOT 0.4 05/20/2025 11:57 PM    BILIDIR 0.1 04/03/2025 05:20 AM       Current significant warfarin drug-drug interactions include:   -prednisone, doxycycline: may increase anticoagulant effects of warfarin.     Recent Labs     05/26/25  0920   HGB 10.8*     Date Warfarin Dose INR Heparin or LMWH Comment   5/21 1 mg 3.2  (5/20) --    5/22 NO DOSE 5.6 --    5/23 NO DOSE 5.6 --    5/24 NO DOSE 4.4 --    5/25 NO DOSE 3.3 --    5/26 1 mg 2.3 --    5/27 1 mg 2.2 --             Assessment:  Patient is a 69 y.o. male on warfarin for History of  VTE/PE.  Patient's home warfarin dosing regimen is documented as warfarin 3 mg daily per SNF documentation.   Goal INR 2-3  INR today 2.2  Prednisone taper set to end 6/6. Patient will need close INR monitoring as steroids will lead to increased in INR.    Plan:  Will give warfarin 1 mg tonight   Daily PT/INR until the INR is stable within the therapeutic range  Pharmacist will follow and monitor/adjust dosing as necessary    Sharath Lopez, PharmD, [facility]  5/27/2025  10:46 AM    SEB: [phone]  SEY: [phone]  SJW: [phone]

## 2025-05-27 NOTE — PROGRESS NOTES
Department of Internal Medicine  Nephrology Progress Note      Events reviewed.    CHIEF COMPLAINT:  We are following Mr. Mcdonald for REYNOLD and hyperkalemia. He has no complaints today.     PHYSICAL EXAM:      Vitals:    VITALS:  /71   Pulse 75   Temp 97.4 °F (36.3 °C) (Oral)   Resp 20   Ht 1.778 m (5' 10\")   Wt (!) 170.3 kg (375 lb 6.4 oz)   SpO2 93%   BMI 53.86 kg/m²   24HR INTAKE/OUTPUT:    Intake/Output Summary (Last 24 hours) at 5/27/2025 1402  Last data filed at 5/27/2025 1316  Gross per 24 hour   Intake 960 ml   Output 3800 ml   Net -2840 ml         Constitutional: Patient is awake, alert, no distress  HEENT: Pupils are equal reactive  Respiratory: Decreased breath sounds remains  Cardiovascular/Edema: Heart sounds are regular  Gastrointestinal: Abdomen soft, 1+ abdominal wall edema  Neurologic: Nonfocal  Skin: No skin rashes  Other: Trace edema, 1+ abdominal wall edema    Scheduled Meds:   doxycycline  100 mg Oral 2 times per day    polyethylene glycol  17 g Oral BID    warfarin  1 mg Oral Once    [START ON 5/28/2025] predniSONE  30 mg Oral Daily    Followed by    [START ON 5/31/2025] predniSONE  20 mg Oral Daily    Followed by    [START ON 6/3/2025] predniSONE  10 mg Oral Daily    sennosides-docusate sodium  2 tablet Oral BID    cefepime  2,000 mg IntraVENous Q12H    menthol-zinc oxide   Topical BID    guaiFENesin  400 mg Oral 4x daily    insulin lispro  7 Units SubCUTAneous TID WC    insulin lispro  0-16 Units SubCUTAneous 4x Daily AC & HS    insulin glargine  20 Units SubCUTAneous Daily    polycarbophil  625 mg Oral Daily    bumetanide  2 mg Oral Daily    pantoprazole  40 mg Oral Daily    budesonide  0.5 mg Nebulization BID RT    arformoterol tartrate  15 mcg Nebulization BID RT    sodium chloride flush  5-40 mL IntraVENous 2 times per day    aspirin  81 mg Oral BID    [Held by provider] metoprolol tartrate  12.5 mg Oral BID    miconazole  1 Application Topical BID    tamsulosin  0.4 mg Oral  the left lobe of the thyroid in which ultrasound correlation on a nonemergent basis is  suggested.  4. Stable enlarged ascending thoracic aorta largest AP dimension measuring  4.6 cm.      BRIEF SUMMARY OF INITIAL CONSULT:    Brief Mr. Mcdonald is a 69-year-old  man with history of CKD stage IIIa, with severe proteinuria, probably related to diabetic kidney disease, baseline creatinine 1.3-1.5 mg/dL, HTN, type 2 DM, CAD status post CABG, HFpEF 60%, PE, SUKHWINDER on BiPAP, morbid obesity, hyperlipidemia, BMI >54, trimalleolar fracture of the left ankle/foot, who was admitted on 5/20/2025 after he was referred to the ER from [facility] for evaluation of hypoxemia, chest x-ray showed mild pulmonary edema, CT chest without contrast showed some increased markings in the left lung base concerning for possible aspiration, his proBNP was 2790, his ABG showed a pH of 7.246 with a PCO2 70.9, he was admitted with a diagnosis of acute respiratory failure and left lower lobe pneumonia, he has well was found to have rhinovirus.  On admission his potassium level was 5.6, creatinine 1.41, since admission his creatinine level has increased up to 1.9 mg/dL and his potassium is up to 7.3 meq/L although it is a hemolyzed sample, reasons for this consultation.  Prior to admission his medications included Lasix 20 mg p.o. daily.  Patient reports in the last 2 weeks or so he has gained significant amount of weight.    Problems resolved:  Hyperkalemia, 2/2 #1 and extracellular shift due to hyperglycemia, and probably hyporeninemic hypoaldosteronism state, potassium levels improved      IMPRESSION/RECOMMENDATIONS:      REYNOLD on CKD, probably hemodynamically mediated due to heart failure versus intravascular volume depletion, renal function stable, creatinine 1.9 mg/dL    CKD stage IIIa with severe proteinuria, probably 2/2 diabetic kidney disease, baseline creatinine 1.3-1.5 mg/dL    HFpEF 60%, proBNP 2780>2280 >1830, on

## 2025-05-27 NOTE — PROGRESS NOTES
Call light in patients room malfunctioning. Maintenance called out to repair, but unable to do so until tomorrow. Explained situation to patient, that call light cannot reach the nurses station and the code blue button may not function either. For these reasons plan was to move patient to a new room, but patient is adamantly refusing. He states he will use the room phone or his cell phone to reach the nurse's station and will \"take his chances\".     CNM notified.

## 2025-05-27 NOTE — CARE COORDINATION
Social Work/Discharge Planning:  Discharge order noted.  Patient to return to [facility].  Called [facility] and placed patient on Will Call.  Not[facility] of [facility] discharge for today.  Electronically signed by SUGAR Nieves on 5/27/2025 at 10:39 AM    Addendum:  Unit arranged ambulance transport for 3:30pm to [facility].  Left message for patient wife Michaelle informing her of discharge time.  Notified [facility] of discharge time via [facility]. Electronically signed by SUGAR Nieves on 5/27/2025 at 1:21 PM

## 2025-05-27 NOTE — PROGRESS NOTES
IV to PO Conversion Policy     Notification of IV to PO conversion:    This patient's order for doxycycline IV has been changed to doxycycline PO as approved by the [facility] INTRAVENOUS TO ORAL Policy.    If the patient should become strict NPO while on this therapy, contact the prescriber for further orders.    torrey rodriguez RPH  5/27/2025  7:20 AM

## 2025-06-05 NOTE — PROGRESS NOTES
Physician Progress Note      PATIENT:               GRAYSON WHYTE  CSN #:                  [id number]  :                       1955  ADMIT DATE:       2025 10:38 PM  DISCH DATE:        2025 5:19 PM  RESPONDING  PROVIDER #:        Yoan Cotter MD          QUERY TEXT:    Pneumonia is documented in the medical record Dr. Beatty's  H/P  on 25,    Dr. Marino nephrology consult note 25- 25. Please specify the   type of pneumonia and the causative organism:    The clinical indicators include:  --25  H/P: Pulmonary/Chest: auscultation bilaterally- no wheezes, rales   or rhonchi, normal air movement, no respiratory distress.  Coarse lung sounds   bilaterally with diminished air movement on BiPAP. RR 20, SpO2 97%,  admit   respiratory failure with hypoxia and hypercapnia. Left lower lobe   pneumonia-seen on CT chest.  25  procalcitonin 0.18  -- - 25 Dr. Marino nephrology consult notes \"he was admitted   with a diagnosis of acute respiratory failure and left lower lobe pneumonia,   he has well was found to have rhinovirus.\"  -- ordered 25  CXR, CT chest, blood cultures,  respiratory panel,   Cefepime and Doxycycline initiated in ED, pulmonary consult, procalcitonin  Options provided:  -- Gram negative pneumonia  -- Other - I will add my own diagnosis  -- Disagree - Not applicable / Not valid  -- Disagree - Clinically unable to determine / Unknown  -- Refer to Clinical Documentation Reviewer    PROVIDER RESPONSE TEXT:    This patient has gram negative pneumonia.    Query created by: Yovanny Donis on 2025 2:25 PM      Electronically signed by:  Yoan Cotter MD 2025 7:24 AM

## 2025-08-11 RX ORDER — LORATADINE 10 MG/1
10 TABLET ORAL DAILY PRN
COMMUNITY

## 2025-08-11 RX ORDER — MINERAL OIL/HYDROPHIL PETROLAT
OINTMENT (GRAM) TOPICAL 2 TIMES DAILY
COMMUNITY

## 2025-08-11 RX ORDER — ALBUTEROL SULFATE 0.83 MG/ML
2.5 SOLUTION RESPIRATORY (INHALATION) EVERY 4 HOURS PRN
COMMUNITY

## 2025-08-11 RX ORDER — MUPIROCIN 2 %
OINTMENT (GRAM) TOPICAL 2 TIMES DAILY
COMMUNITY

## 2025-08-12 ENCOUNTER — APPOINTMENT (OUTPATIENT)
Dept: GENERAL RADIOLOGY | Age: 70
End: 2025-08-12
Attending: SURGERY
Payer: MEDICARE

## 2025-08-12 ENCOUNTER — HOSPITAL ENCOUNTER (OUTPATIENT)
Age: 70
Setting detail: OUTPATIENT SURGERY
Discharge: HOME | End: 2025-08-12
Attending: SURGERY | Admitting: SURGERY
Payer: MEDICARE

## 2025-08-12 ENCOUNTER — ANESTHESIA EVENT (OUTPATIENT)
Dept: OPERATING ROOM | Age: 70
End: 2025-08-12
Payer: MEDICARE

## 2025-08-12 ENCOUNTER — HOSPITAL ENCOUNTER (OUTPATIENT)
Dept: GENERAL RADIOLOGY | Age: 70
Setting detail: OUTPATIENT SURGERY
Discharge: HOME | End: 2025-08-14
Attending: SURGERY
Payer: MEDICARE

## 2025-08-12 ENCOUNTER — ANESTHESIA (OUTPATIENT)
Dept: OPERATING ROOM | Age: 70
End: 2025-08-12
Payer: MEDICARE

## 2025-08-12 VITALS
BODY MASS INDEX: 45.1 KG/M2 | HEIGHT: 70 IN | WEIGHT: 315 LBS | DIASTOLIC BLOOD PRESSURE: 71 MMHG | TEMPERATURE: 97.2 F | RESPIRATION RATE: 12 BRPM | OXYGEN SATURATION: 95 % | HEART RATE: 93 BPM | SYSTOLIC BLOOD PRESSURE: 110 MMHG

## 2025-08-12 DIAGNOSIS — C18.6 MALIGNANT NEOPLASM OF DESCENDING COLON (HCC): Primary | ICD-10-CM

## 2025-08-12 DIAGNOSIS — R52 PAIN: ICD-10-CM

## 2025-08-12 PROBLEM — E66.01 MORBID OBESITY (HCC): Status: ACTIVE | Noted: 2025-08-12

## 2025-08-12 LAB
GLUCOSE BLD-MCNC: 87 MG/DL (ref 74–99)
INR PPP: 1.8
PARTIAL THROMBOPLASTIN TIME: 32.3 SEC (ref 24.5–35.1)
PROTHROMBIN TIME: 19.1 SEC (ref 9.3–12.4)

## 2025-08-12 PROCEDURE — 3600000002 HC SURGERY LEVEL 2 BASE: Performed by: SURGERY

## 2025-08-12 PROCEDURE — 2500000003 HC RX 250 WO HCPCS

## 2025-08-12 PROCEDURE — 85610 PROTHROMBIN TIME: CPT

## 2025-08-12 PROCEDURE — C1788 PORT, INDWELLING, IMP: HCPCS | Performed by: SURGERY

## 2025-08-12 PROCEDURE — 6360000002 HC RX W HCPCS

## 2025-08-12 PROCEDURE — 2580000003 HC RX 258: Performed by: SURGERY

## 2025-08-12 PROCEDURE — 71045 X-RAY EXAM CHEST 1 VIEW: CPT

## 2025-08-12 PROCEDURE — 85730 THROMBOPLASTIN TIME PARTIAL: CPT

## 2025-08-12 PROCEDURE — 3600000012 HC SURGERY LEVEL 2 ADDTL 15MIN: Performed by: SURGERY

## 2025-08-12 PROCEDURE — 2709999900 HC NON-CHARGEABLE SUPPLY: Performed by: SURGERY

## 2025-08-12 PROCEDURE — 82962 GLUCOSE BLOOD TEST: CPT

## 2025-08-12 PROCEDURE — 3700000001 HC ADD 15 MINUTES (ANESTHESIA): Performed by: SURGERY

## 2025-08-12 PROCEDURE — 6360000002 HC RX W HCPCS: Performed by: SURGERY

## 2025-08-12 PROCEDURE — 7100000011 HC PHASE II RECOVERY - ADDTL 15 MIN: Performed by: SURGERY

## 2025-08-12 PROCEDURE — 6370000000 HC RX 637 (ALT 250 FOR IP): Performed by: STUDENT IN AN ORGANIZED HEALTH CARE EDUCATION/TRAINING PROGRAM

## 2025-08-12 PROCEDURE — 7100000010 HC PHASE II RECOVERY - FIRST 15 MIN: Performed by: SURGERY

## 2025-08-12 PROCEDURE — 36561 INSERT TUNNELED CV CATH: CPT | Performed by: SURGERY

## 2025-08-12 PROCEDURE — 3700000000 HC ANESTHESIA ATTENDED CARE: Performed by: SURGERY

## 2025-08-12 PROCEDURE — 77001 FLUOROGUIDE FOR VEIN DEVICE: CPT | Performed by: SURGERY

## 2025-08-12 DEVICE — PORT INFUS 8FR PWR INJ CT FOR VASC ACCS CATH: Type: IMPLANTABLE DEVICE | Site: SUBCLAVIAN | Status: FUNCTIONAL

## 2025-08-12 RX ORDER — SODIUM CHLORIDE 9 MG/ML
INJECTION, SOLUTION INTRAVENOUS PRN
Status: DISCONTINUED | OUTPATIENT
Start: 2025-08-12 | End: 2025-08-12 | Stop reason: HOSPADM

## 2025-08-12 RX ORDER — ONDANSETRON 2 MG/ML
4 INJECTION INTRAMUSCULAR; INTRAVENOUS
Status: DISCONTINUED | OUTPATIENT
Start: 2025-08-12 | End: 2025-08-12 | Stop reason: HOSPADM

## 2025-08-12 RX ORDER — SODIUM CHLORIDE 0.9 % (FLUSH) 0.9 %
5-40 SYRINGE (ML) INJECTION EVERY 12 HOURS SCHEDULED
Status: DISCONTINUED | OUTPATIENT
Start: 2025-08-12 | End: 2025-08-12 | Stop reason: HOSPADM

## 2025-08-12 RX ORDER — ACETAMINOPHEN 500 MG
1000 TABLET ORAL ONCE
Status: COMPLETED | OUTPATIENT
Start: 2025-08-12 | End: 2025-08-12

## 2025-08-12 RX ORDER — SODIUM CHLORIDE 9 MG/ML
INJECTION, SOLUTION INTRAVENOUS CONTINUOUS
Status: DISCONTINUED | OUTPATIENT
Start: 2025-08-12 | End: 2025-08-12 | Stop reason: HOSPADM

## 2025-08-12 RX ORDER — GLYCOPYRROLATE 0.2 MG/ML
INJECTION INTRAMUSCULAR; INTRAVENOUS
Status: DISCONTINUED | OUTPATIENT
Start: 2025-08-12 | End: 2025-08-12 | Stop reason: SDUPTHER

## 2025-08-12 RX ORDER — LIDOCAINE HYDROCHLORIDE AND EPINEPHRINE 10; 10 MG/ML; UG/ML
INJECTION, SOLUTION INFILTRATION; PERINEURAL PRN
Status: DISCONTINUED | OUTPATIENT
Start: 2025-08-12 | End: 2025-08-12 | Stop reason: ALTCHOICE

## 2025-08-12 RX ORDER — DEXAMETHASONE SODIUM PHOSPHATE 4 MG/ML
INJECTION, SOLUTION INTRA-ARTICULAR; INTRALESIONAL; INTRAMUSCULAR; INTRAVENOUS; SOFT TISSUE
Status: DISCONTINUED | OUTPATIENT
Start: 2025-08-12 | End: 2025-08-12 | Stop reason: SDUPTHER

## 2025-08-12 RX ORDER — MIDAZOLAM HYDROCHLORIDE 1 MG/ML
INJECTION, SOLUTION INTRAMUSCULAR; INTRAVENOUS
Status: DISCONTINUED | OUTPATIENT
Start: 2025-08-12 | End: 2025-08-12 | Stop reason: SDUPTHER

## 2025-08-12 RX ORDER — DIPHENHYDRAMINE HYDROCHLORIDE 50 MG/ML
12.5 INJECTION, SOLUTION INTRAMUSCULAR; INTRAVENOUS
Status: DISCONTINUED | OUTPATIENT
Start: 2025-08-12 | End: 2025-08-12 | Stop reason: HOSPADM

## 2025-08-12 RX ORDER — HYDROCODONE BITARTRATE AND ACETAMINOPHEN 5; 325 MG/1; MG/1
1 TABLET ORAL EVERY 4 HOURS PRN
Qty: 18 TABLET | Refills: 0 | Status: SHIPPED | OUTPATIENT
Start: 2025-08-12 | End: 2025-08-15

## 2025-08-12 RX ORDER — ONDANSETRON 2 MG/ML
INJECTION INTRAMUSCULAR; INTRAVENOUS
Status: DISCONTINUED | OUTPATIENT
Start: 2025-08-12 | End: 2025-08-12 | Stop reason: SDUPTHER

## 2025-08-12 RX ORDER — PROPOFOL 10 MG/ML
INJECTION, EMULSION INTRAVENOUS
Status: DISCONTINUED | OUTPATIENT
Start: 2025-08-12 | End: 2025-08-12 | Stop reason: SDUPTHER

## 2025-08-12 RX ORDER — SODIUM CHLORIDE 0.9 % (FLUSH) 0.9 %
5-40 SYRINGE (ML) INJECTION PRN
Status: DISCONTINUED | OUTPATIENT
Start: 2025-08-12 | End: 2025-08-12 | Stop reason: HOSPADM

## 2025-08-12 RX ORDER — PROCHLORPERAZINE EDISYLATE 5 MG/ML
5 INJECTION INTRAMUSCULAR; INTRAVENOUS
Status: DISCONTINUED | OUTPATIENT
Start: 2025-08-12 | End: 2025-08-12 | Stop reason: HOSPADM

## 2025-08-12 RX ORDER — HEPARIN 100 UNIT/ML
SYRINGE INTRAVENOUS PRN
Status: DISCONTINUED | OUTPATIENT
Start: 2025-08-12 | End: 2025-08-12 | Stop reason: ALTCHOICE

## 2025-08-12 RX ORDER — LABETALOL HYDROCHLORIDE 5 MG/ML
5 INJECTION, SOLUTION INTRAVENOUS
Status: DISCONTINUED | OUTPATIENT
Start: 2025-08-12 | End: 2025-08-12 | Stop reason: HOSPADM

## 2025-08-12 RX ORDER — CEFAZOLIN SODIUM 1 G/3ML
INJECTION, POWDER, FOR SOLUTION INTRAMUSCULAR; INTRAVENOUS
Status: DISCONTINUED | OUTPATIENT
Start: 2025-08-12 | End: 2025-08-12 | Stop reason: SDUPTHER

## 2025-08-12 RX ORDER — IPRATROPIUM BROMIDE AND ALBUTEROL SULFATE 2.5; .5 MG/3ML; MG/3ML
1 SOLUTION RESPIRATORY (INHALATION)
Status: DISCONTINUED | OUTPATIENT
Start: 2025-08-12 | End: 2025-08-12 | Stop reason: HOSPADM

## 2025-08-12 RX ADMIN — PROPOFOL 25 MCG/KG/MIN: 10 INJECTION, EMULSION INTRAVENOUS at 11:00

## 2025-08-12 RX ADMIN — Medication 20 MG: at 10:22

## 2025-08-12 RX ADMIN — DEXAMETHASONE SODIUM PHOSPHATE 10 MG: 4 INJECTION, SOLUTION INTRAMUSCULAR; INTRAVENOUS at 10:24

## 2025-08-12 RX ADMIN — GLYCOPYRROLATE 0.2 MG: 0.2 INJECTION INTRAMUSCULAR; INTRAVENOUS at 10:24

## 2025-08-12 RX ADMIN — Medication 20 MG: at 11:00

## 2025-08-12 RX ADMIN — CEFAZOLIN 3 G: 1 INJECTION, POWDER, FOR SOLUTION INTRAMUSCULAR; INTRAVENOUS at 10:27

## 2025-08-12 RX ADMIN — ONDANSETRON 4 MG: 2 INJECTION, SOLUTION INTRAMUSCULAR; INTRAVENOUS at 10:24

## 2025-08-12 RX ADMIN — MIDAZOLAM 1 MG: 1 INJECTION INTRAMUSCULAR; INTRAVENOUS at 10:21

## 2025-08-12 RX ADMIN — MIDAZOLAM 1 MG: 1 INJECTION INTRAMUSCULAR; INTRAVENOUS at 10:18

## 2025-08-12 RX ADMIN — ACETAMINOPHEN 1000 MG: 500 TABLET ORAL at 11:38

## 2025-08-12 RX ADMIN — Medication 10 MG: at 10:38

## 2025-08-12 RX ADMIN — PROPOFOL 50 MCG/KG/MIN: 10 INJECTION, EMULSION INTRAVENOUS at 10:22

## 2025-08-12 RX ADMIN — SODIUM CHLORIDE: 9 INJECTION, SOLUTION INTRAVENOUS at 10:12

## 2025-08-12 ASSESSMENT — PAIN DESCRIPTION - DESCRIPTORS
DESCRIPTORS: ACHING;SORE
DESCRIPTORS: ACHING

## 2025-08-12 ASSESSMENT — PAIN - FUNCTIONAL ASSESSMENT
PAIN_FUNCTIONAL_ASSESSMENT: 0-10
PAIN_FUNCTIONAL_ASSESSMENT: 0-10

## 2025-08-12 ASSESSMENT — PAIN SCALES - GENERAL
PAINLEVEL_OUTOF10: 5
PAINLEVEL_OUTOF10: 0

## 2025-08-12 ASSESSMENT — PAIN DESCRIPTION - ORIENTATION: ORIENTATION: RIGHT

## 2025-08-12 ASSESSMENT — PAIN DESCRIPTION - LOCATION: LOCATION: CHEST

## 2025-08-20 ENCOUNTER — OFFICE VISIT (OUTPATIENT)
Dept: SURGERY | Age: 70
End: 2025-08-20
Payer: MEDICARE

## 2025-08-20 DIAGNOSIS — L76.31 POSTOPERATIVE HEMATOMA OF SKIN FOLLOWING DERMATOLOGIC PROCEDURE: Primary | ICD-10-CM

## 2025-08-20 DIAGNOSIS — E66.01 MORBID OBESITY WITH BMI OF 60.0-69.9, ADULT (HCC): Chronic | ICD-10-CM

## 2025-08-20 PROCEDURE — 4004F PT TOBACCO SCREEN RCVD TLK: CPT | Performed by: SURGERY

## 2025-08-20 PROCEDURE — 1160F RVW MEDS BY RX/DR IN RCRD: CPT | Performed by: SURGERY

## 2025-08-20 PROCEDURE — 3017F COLORECTAL CA SCREEN DOC REV: CPT | Performed by: SURGERY

## 2025-08-20 PROCEDURE — 1123F ACP DISCUSS/DSCN MKR DOCD: CPT | Performed by: SURGERY

## 2025-08-20 PROCEDURE — 99213 OFFICE O/P EST LOW 20 MIN: CPT | Performed by: SURGERY

## 2025-08-20 PROCEDURE — 1159F MED LIST DOCD IN RCRD: CPT | Performed by: SURGERY

## 2025-08-20 PROCEDURE — G8417 CALC BMI ABV UP PARAM F/U: HCPCS | Performed by: SURGERY

## 2025-08-20 PROCEDURE — G8427 DOCREV CUR MEDS BY ELIG CLIN: HCPCS | Performed by: SURGERY

## 2025-08-26 ENCOUNTER — TELEPHONE (OUTPATIENT)
Dept: CASE MANAGEMENT | Age: 70
End: 2025-08-26

## 2025-08-26 ENCOUNTER — OFFICE VISIT (OUTPATIENT)
Age: 70
End: 2025-08-26
Payer: MEDICARE

## 2025-08-26 ENCOUNTER — HOSPITAL ENCOUNTER (OUTPATIENT)
Dept: INFUSION THERAPY | Age: 70
Discharge: HOME | End: 2025-08-26

## 2025-08-26 VITALS
TEMPERATURE: 97.1 F | SYSTOLIC BLOOD PRESSURE: 133 MMHG | OXYGEN SATURATION: 96 % | BODY MASS INDEX: 45.1 KG/M2 | HEART RATE: 65 BPM | WEIGHT: 315 LBS | DIASTOLIC BLOOD PRESSURE: 63 MMHG | HEIGHT: 70 IN

## 2025-08-26 DIAGNOSIS — C19 COLORECTAL CANCER (HCC): ICD-10-CM

## 2025-08-26 DIAGNOSIS — C18.6 MALIGNANT NEOPLASM OF DESCENDING COLON (HCC): Primary | ICD-10-CM

## 2025-08-26 PROCEDURE — 3078F DIAST BP <80 MM HG: CPT | Performed by: INTERNAL MEDICINE

## 2025-08-26 PROCEDURE — 3075F SYST BP GE 130 - 139MM HG: CPT | Performed by: INTERNAL MEDICINE

## 2025-08-26 PROCEDURE — 99205 OFFICE O/P NEW HI 60 MIN: CPT | Performed by: INTERNAL MEDICINE

## 2025-08-26 PROCEDURE — 1159F MED LIST DOCD IN RCRD: CPT | Performed by: INTERNAL MEDICINE

## 2025-08-26 PROCEDURE — 1126F AMNT PAIN NOTED NONE PRSNT: CPT | Performed by: INTERNAL MEDICINE

## 2025-08-26 PROCEDURE — 99204 OFFICE O/P NEW MOD 45 MIN: CPT | Performed by: INTERNAL MEDICINE

## 2025-08-26 PROCEDURE — G8417 CALC BMI ABV UP PARAM F/U: HCPCS | Performed by: INTERNAL MEDICINE

## 2025-08-26 PROCEDURE — G8427 DOCREV CUR MEDS BY ELIG CLIN: HCPCS | Performed by: INTERNAL MEDICINE

## 2025-08-26 PROCEDURE — 4004F PT TOBACCO SCREEN RCVD TLK: CPT | Performed by: INTERNAL MEDICINE

## 2025-08-26 PROCEDURE — 3017F COLORECTAL CA SCREEN DOC REV: CPT | Performed by: INTERNAL MEDICINE

## 2025-08-26 PROCEDURE — 1123F ACP DISCUSS/DSCN MKR DOCD: CPT | Performed by: INTERNAL MEDICINE

## 2025-08-26 PROCEDURE — 1160F RVW MEDS BY RX/DR IN RCRD: CPT | Performed by: INTERNAL MEDICINE

## 2025-08-27 DIAGNOSIS — C18.6 MALIGNANT NEOPLASM OF DESCENDING COLON (HCC): Primary | ICD-10-CM

## 2025-09-02 ENCOUNTER — OFFICE VISIT (OUTPATIENT)
Age: 70
End: 2025-09-02
Payer: MEDICARE

## 2025-09-02 ENCOUNTER — OFFICE VISIT (OUTPATIENT)
Dept: SURGERY | Age: 70
End: 2025-09-02
Payer: MEDICARE

## 2025-09-02 ENCOUNTER — HOSPITAL ENCOUNTER (OUTPATIENT)
Dept: INFUSION THERAPY | Age: 70
Discharge: HOME | End: 2025-09-02
Payer: MEDICARE

## 2025-09-02 VITALS
HEIGHT: 69 IN | DIASTOLIC BLOOD PRESSURE: 71 MMHG | HEART RATE: 71 BPM | BODY MASS INDEX: 46.65 KG/M2 | WEIGHT: 315 LBS | SYSTOLIC BLOOD PRESSURE: 141 MMHG

## 2025-09-02 VITALS
OXYGEN SATURATION: 96 % | DIASTOLIC BLOOD PRESSURE: 65 MMHG | TEMPERATURE: 97.3 F | SYSTOLIC BLOOD PRESSURE: 137 MMHG | HEART RATE: 79 BPM | BODY MASS INDEX: 50.08 KG/M2 | HEIGHT: 70 IN

## 2025-09-02 DIAGNOSIS — Z51.11 ENCOUNTER FOR CHEMOTHERAPY MANAGEMENT: ICD-10-CM

## 2025-09-02 DIAGNOSIS — T14.8XXA INFECTION OF HEMATOMA OF WOUND: Primary | ICD-10-CM

## 2025-09-02 DIAGNOSIS — L08.9 INFECTION OF HEMATOMA OF WOUND: Primary | ICD-10-CM

## 2025-09-02 DIAGNOSIS — C19 COLORECTAL CANCER (HCC): ICD-10-CM

## 2025-09-02 DIAGNOSIS — C18.6 MALIGNANT NEOPLASM OF DESCENDING COLON (HCC): Primary | ICD-10-CM

## 2025-09-02 DIAGNOSIS — C18.6 MALIGNANT NEOPLASM OF DESCENDING COLON (HCC): ICD-10-CM

## 2025-09-02 LAB
ALBUMIN SERPL-MCNC: 3.4 G/DL (ref 3.5–5.2)
ALP SERPL-CCNC: 85 U/L (ref 40–129)
ALT SERPL-CCNC: 11 U/L (ref 0–50)
ANION GAP SERPL CALCULATED.3IONS-SCNC: 10 MMOL/L (ref 7–16)
AST SERPL-CCNC: 19 U/L (ref 0–50)
BILIRUB SERPL-MCNC: 0.3 MG/DL (ref 0–1.2)
BUN SERPL-MCNC: 56 MG/DL (ref 8–23)
CALCIUM SERPL-MCNC: 9 MG/DL (ref 8.8–10.2)
CHLORIDE SERPL-SCNC: 108 MMOL/L (ref 98–107)
CO2 SERPL-SCNC: 27 MMOL/L (ref 22–29)
CREAT SERPL-MCNC: 1.7 MG/DL (ref 0.7–1.2)
GFR, ESTIMATED: 44 ML/MIN/1.73M2
GLUCOSE SERPL-MCNC: 145 MG/DL (ref 74–99)
POTASSIUM SERPL-SCNC: 5.2 MMOL/L (ref 3.5–5.1)
PROT SERPL-MCNC: 6.2 G/DL (ref 6.4–8.3)
SODIUM SERPL-SCNC: 145 MMOL/L (ref 136–145)

## 2025-09-02 PROCEDURE — 80053 COMPREHEN METABOLIC PANEL: CPT

## 2025-09-02 PROCEDURE — 10140 I&D HMTMA SEROMA/FLUID COLLJ: CPT | Performed by: SURGERY

## 2025-09-02 PROCEDURE — 36415 COLL VENOUS BLD VENIPUNCTURE: CPT

## 2025-09-02 PROCEDURE — 99213 OFFICE O/P EST LOW 20 MIN: CPT | Performed by: INTERNAL MEDICINE

## 2025-09-02 RX ORDER — DEXTROSE MONOHYDRATE 50 MG/ML
5-250 INJECTION, SOLUTION INTRAVENOUS PRN
Status: CANCELLED | OUTPATIENT
Start: 2025-09-03

## 2025-09-02 RX ORDER — ACETAMINOPHEN 325 MG/1
650 TABLET ORAL
Status: CANCELLED | OUTPATIENT
Start: 2025-09-03

## 2025-09-02 RX ORDER — DIPHENHYDRAMINE HYDROCHLORIDE 50 MG/ML
50 INJECTION, SOLUTION INTRAMUSCULAR; INTRAVENOUS
Status: CANCELLED | OUTPATIENT
Start: 2025-09-03

## 2025-09-02 RX ORDER — FAMOTIDINE 10 MG/ML
20 INJECTION, SOLUTION INTRAVENOUS
Status: CANCELLED | OUTPATIENT
Start: 2025-09-03

## 2025-09-02 RX ORDER — FLUOROURACIL 50 MG/ML
400 INJECTION, SOLUTION INTRAVENOUS ONCE
Status: CANCELLED | OUTPATIENT
Start: 2025-09-03 | End: 2025-09-02

## 2025-09-02 RX ORDER — MEPERIDINE HYDROCHLORIDE 50 MG/ML
12.5 INJECTION INTRAMUSCULAR; INTRAVENOUS; SUBCUTANEOUS PRN
Status: CANCELLED | OUTPATIENT
Start: 2025-09-03

## 2025-09-02 RX ORDER — PALONOSETRON 0.05 MG/ML
0.25 INJECTION, SOLUTION INTRAVENOUS ONCE
Status: CANCELLED | OUTPATIENT
Start: 2025-09-03 | End: 2025-09-02

## 2025-09-02 RX ORDER — HEPARIN SODIUM (PORCINE) LOCK FLUSH IV SOLN 100 UNIT/ML 100 UNIT/ML
500 SOLUTION INTRAVENOUS PRN
Status: CANCELLED | OUTPATIENT
Start: 2025-09-03

## 2025-09-02 RX ORDER — HYDROCORTISONE SODIUM SUCCINATE 100 MG/2ML
100 INJECTION INTRAMUSCULAR; INTRAVENOUS
Status: CANCELLED | OUTPATIENT
Start: 2025-09-03

## 2025-09-02 RX ORDER — HEPARIN SODIUM (PORCINE) LOCK FLUSH IV SOLN 100 UNIT/ML 100 UNIT/ML
500 SOLUTION INTRAVENOUS PRN
Status: CANCELLED | OUTPATIENT
Start: 2025-09-05

## 2025-09-02 RX ORDER — SODIUM CHLORIDE 0.9 % (FLUSH) 0.9 %
5-40 SYRINGE (ML) INJECTION PRN
Status: CANCELLED | OUTPATIENT
Start: 2025-09-03

## 2025-09-02 RX ORDER — ALBUTEROL SULFATE 90 UG/1
4 INHALANT RESPIRATORY (INHALATION) PRN
Status: CANCELLED | OUTPATIENT
Start: 2025-09-03

## 2025-09-02 RX ORDER — SODIUM CHLORIDE 9 MG/ML
5-250 INJECTION, SOLUTION INTRAVENOUS PRN
Status: CANCELLED | OUTPATIENT
Start: 2025-09-03

## 2025-09-02 RX ORDER — EPINEPHRINE 1 MG/ML
0.3 INJECTION, SOLUTION, CONCENTRATE INTRAVENOUS PRN
Status: CANCELLED | OUTPATIENT
Start: 2025-09-03

## 2025-09-02 RX ORDER — SODIUM CHLORIDE 0.9 % (FLUSH) 0.9 %
5-40 SYRINGE (ML) INJECTION PRN
Status: CANCELLED | OUTPATIENT
Start: 2025-09-05

## 2025-09-02 RX ORDER — SODIUM CHLORIDE 9 MG/ML
INJECTION, SOLUTION INTRAVENOUS PRN
Status: CANCELLED | OUTPATIENT
Start: 2025-09-03

## 2025-09-02 RX ORDER — SODIUM CHLORIDE 9 MG/ML
5-250 INJECTION, SOLUTION INTRAVENOUS PRN
OUTPATIENT
Start: 2025-09-05

## 2025-09-02 RX ORDER — ONDANSETRON 2 MG/ML
8 INJECTION INTRAMUSCULAR; INTRAVENOUS
Status: CANCELLED | OUTPATIENT
Start: 2025-09-03

## 2025-09-02 ASSESSMENT — PAIN SCALES - GENERAL: PAINLEVEL_OUTOF10: 0

## 2025-09-03 ENCOUNTER — HOSPITAL ENCOUNTER (OUTPATIENT)
Dept: INFUSION THERAPY | Age: 70
Discharge: HOME | End: 2025-09-03
Payer: MEDICARE

## 2025-09-03 VITALS
OXYGEN SATURATION: 94 % | HEART RATE: 88 BPM | TEMPERATURE: 97.4 F | DIASTOLIC BLOOD PRESSURE: 74 MMHG | SYSTOLIC BLOOD PRESSURE: 155 MMHG | RESPIRATION RATE: 16 BRPM

## 2025-09-03 DIAGNOSIS — C18.6 MALIGNANT NEOPLASM OF DESCENDING COLON (HCC): Primary | ICD-10-CM

## 2025-09-03 PROCEDURE — 2580000003 HC RX 258: Performed by: INTERNAL MEDICINE

## 2025-09-03 PROCEDURE — 96415 CHEMO IV INFUSION ADDL HR: CPT

## 2025-09-03 PROCEDURE — 96411 CHEMO IV PUSH ADDL DRUG: CPT

## 2025-09-03 PROCEDURE — 96375 TX/PRO/DX INJ NEW DRUG ADDON: CPT

## 2025-09-03 PROCEDURE — 96413 CHEMO IV INFUSION 1 HR: CPT

## 2025-09-03 PROCEDURE — 96416 CHEMO PROLONG INFUSE W/PUMP: CPT

## 2025-09-03 PROCEDURE — 6360000002 HC RX W HCPCS: Performed by: INTERNAL MEDICINE

## 2025-09-03 PROCEDURE — 96368 THER/DIAG CONCURRENT INF: CPT

## 2025-09-03 RX ORDER — HEPARIN 100 UNIT/ML
500 SYRINGE INTRAVENOUS PRN
Status: DISCONTINUED | OUTPATIENT
Start: 2025-09-03 | End: 2025-09-04 | Stop reason: HOSPADM

## 2025-09-03 RX ORDER — FLUOROURACIL 50 MG/ML
400 INJECTION, SOLUTION INTRAVENOUS ONCE
Status: COMPLETED | OUTPATIENT
Start: 2025-09-03 | End: 2025-09-03

## 2025-09-03 RX ORDER — DEXTROSE MONOHYDRATE 50 MG/ML
5-250 INJECTION, SOLUTION INTRAVENOUS PRN
Status: DISCONTINUED | OUTPATIENT
Start: 2025-09-03 | End: 2025-09-04 | Stop reason: HOSPADM

## 2025-09-03 RX ORDER — SODIUM CHLORIDE 0.9 % (FLUSH) 0.9 %
5-40 SYRINGE (ML) INJECTION PRN
Status: DISCONTINUED | OUTPATIENT
Start: 2025-09-03 | End: 2025-09-04 | Stop reason: HOSPADM

## 2025-09-03 RX ORDER — PALONOSETRON HYDROCHLORIDE 0.05 MG/ML
0.25 INJECTION, SOLUTION INTRAVENOUS ONCE
Status: COMPLETED | OUTPATIENT
Start: 2025-09-03 | End: 2025-09-03

## 2025-09-03 RX ADMIN — FLUOROURACIL 1100 MG: 50 INJECTION, SOLUTION INTRAVENOUS at 11:41

## 2025-09-03 RX ADMIN — DEXTROSE 200 ML/HR: 5 SOLUTION INTRAVENOUS at 08:27

## 2025-09-03 RX ADMIN — PALONOSETRON 0.25 MG: 0.25 INJECTION, SOLUTION INTRAVENOUS at 08:28

## 2025-09-03 RX ADMIN — FLUOROURACIL 6700 MG: 50 INJECTION, SOLUTION INTRAVENOUS at 11:42

## 2025-09-03 RX ADMIN — DEXAMETHASONE SODIUM PHOSPHATE 12 MG: 4 INJECTION, SOLUTION INTRAMUSCULAR; INTRAVENOUS at 08:37

## 2025-09-03 RX ADMIN — OXALIPLATIN 190 MG: 5 INJECTION, SOLUTION INTRAVENOUS at 09:10

## 2025-09-03 RX ADMIN — LEUCOVORIN CALCIUM 1120 MG: 10 INJECTION INTRAMUSCULAR; INTRAVENOUS at 09:07

## 2025-09-03 ASSESSMENT — PAIN SCALES - GENERAL: PAINLEVEL_OUTOF10: 0

## 2025-09-05 ENCOUNTER — HOSPITAL ENCOUNTER (OUTPATIENT)
Dept: INFUSION THERAPY | Age: 70
Discharge: HOME | End: 2025-09-05
Payer: MEDICARE

## 2025-09-05 DIAGNOSIS — C18.6 MALIGNANT NEOPLASM OF DESCENDING COLON (HCC): Primary | ICD-10-CM

## 2025-09-05 PROCEDURE — 2500000003 HC RX 250 WO HCPCS: Performed by: INTERNAL MEDICINE

## 2025-09-05 PROCEDURE — 6360000002 HC RX W HCPCS: Performed by: INTERNAL MEDICINE

## 2025-09-05 RX ORDER — SODIUM CHLORIDE 0.9 % (FLUSH) 0.9 %
5-40 SYRINGE (ML) INJECTION PRN
Status: DISCONTINUED | OUTPATIENT
Start: 2025-09-05 | End: 2025-09-06 | Stop reason: HOSPADM

## 2025-09-05 RX ORDER — HEPARIN 100 UNIT/ML
500 SYRINGE INTRAVENOUS PRN
Status: DISCONTINUED | OUTPATIENT
Start: 2025-09-05 | End: 2025-09-06 | Stop reason: HOSPADM

## 2025-09-05 RX ADMIN — HEPARIN 500 UNITS: 100 SYRINGE at 09:56

## 2025-09-05 RX ADMIN — SODIUM CHLORIDE, PRESERVATIVE FREE 10 ML: 5 INJECTION INTRAVENOUS at 09:56

## (undated) DEVICE — NEEDLE HYPO 25GA L1.5IN BLU POLYPR HUB S STL REG BVL STR

## (undated) DEVICE — BIT DRL L160MM DIA2.7MM ST CANN QUIK CPL NONRADIOLUCENT ADJ

## (undated) DEVICE — DRAPE SURG CHST BRST 15 X 10 IN FEN STD W/O FLD PCH 121 IN

## (undated) DEVICE — ELECTRODE PT RET AD L9FT HI MOIST COND ADH HYDRGEL CORDED

## (undated) DEVICE — STRAP POS MP 30X3 IN HK LOOP CLOSURE FOAM DISP

## (undated) DEVICE — PIN FIX L225MM DIA6MM S STL FOR L EXT FIX SET

## (undated) DEVICE — DECANTER BG FOR ASEP TRNSF OF FLUIDS FR FLX CONT

## (undated) DEVICE — Device

## (undated) DEVICE — SCREW EXT FIX L150MM DIA5MM THRD L150MM S STL SELF DRL MR

## (undated) DEVICE — DRAPE EQUIP CARM 72X42 IN RUBBER BND CLP

## (undated) DEVICE — TOWEL SURG W17XL27IN BLU COT STD PREWASHED STERILE 6 PER PK

## (undated) DEVICE — PADDING,UNDERCAST,COTTON, 3X4YD STERILE: Brand: MEDLINE

## (undated) DEVICE — GLOVE ORANGE PI 7   MSG9070

## (undated) DEVICE — POST EXT FIX DIA11MM 30DEG OUTRIG MAG RESONANCE CONDITIONAL

## (undated) DEVICE — SYRINGE MED 10ML TRNSLUC BRL PLUNG BLK MRK POLYPR CTRL

## (undated) DEVICE — MANIFOLD SUCT 4 PRT 2 CANSTR FLTR DISP NEPTUNE 2

## (undated) DEVICE — CLAMP EXT FIX DIA8/11MM COMB CLP ON SELF HLD

## (undated) DEVICE — LOWER EXT KNEE DRAPE: Brand: MEDLINE INDUSTRIES, INC.

## (undated) DEVICE — SCREW EXT FIX L80MM DIA4MM THRD DIA3MM S STL SELF DRL BLNT

## (undated) DEVICE — CLAMP EXT FIX L TI ALLY COMB CLP ON SELF HLD

## (undated) DEVICE — C-ARMOR C-ARM EQUIPMENT COVERS CLEAR STERILE UNIVERSAL FIT 12 PER CASE: Brand: C-ARMOR

## (undated) DEVICE — SOLUTION IV 100ML 0.9% SOD CHL PLAS CONT USP VIAFLX 1 PER

## (undated) DEVICE — SYRINGE MED 10ML LUERLOCK TIP W/O SFTY DISP

## (undated) DEVICE — CLAMP EXT FIX L PIN 6 POS MAG RESONANCE CONDITIONAL

## (undated) DEVICE — GUIDEWIRE ORTH L150MM DIA1.25MM S STL NTHRD FOR 4MM CANN

## (undated) DEVICE — DRAPE C ARM W41XL74IN UNIV MOB W RUBBERBAND CLP

## (undated) DEVICE — GOWN SURG XL L47IN BLU FABRICREINFORCED SET IN SL HK LOOP

## (undated) DEVICE — BNDG,ELSTC,MATRIX,STRL,3"X5YD,LF,HOOK&LP: Brand: MEDLINE

## (undated) DEVICE — BLADE SURG 15 SS STRL CISION LF DISP

## (undated) DEVICE — ANTISEPTIC 16OZ H PEROX 1ST AID ORAL DEBRIDING AGNT

## (undated) DEVICE — ROD EXT FIX L200MM DIA11MM C FBR MAG RESONANCE CONDITIONAL

## (undated) DEVICE — SOLUTION SURG PREP 26 CC PURPREP

## (undated) DEVICE — ADHESIVE SKIN CLOSURE TOP 0.8 CC PREM PUR LIQUIBAND RAPID LF

## (undated) DEVICE — GLOVE ORANGE PI 7 1/2   MSG9075

## (undated) DEVICE — ROD EXT FIX L350MM DIA11MM C FBR MR CONDITIONAL